# Patient Record
Sex: MALE | Race: WHITE
[De-identification: names, ages, dates, MRNs, and addresses within clinical notes are randomized per-mention and may not be internally consistent; named-entity substitution may affect disease eponyms.]

---

## 2018-03-15 ENCOUNTER — HOSPITAL ENCOUNTER (EMERGENCY)
Dept: HOSPITAL 87 - ER | Age: 68
LOS: 1 days | Discharge: HOME | End: 2018-03-16
Payer: COMMERCIAL

## 2018-03-15 VITALS — BODY MASS INDEX: 26.72 KG/M2 | WEIGHT: 156.53 LBS | HEIGHT: 64 IN

## 2018-03-15 DIAGNOSIS — R22.0: ICD-10-CM

## 2018-03-15 DIAGNOSIS — R10.9: Primary | ICD-10-CM

## 2018-03-15 DIAGNOSIS — F41.9: ICD-10-CM

## 2018-03-15 DIAGNOSIS — I10: ICD-10-CM

## 2018-03-15 DIAGNOSIS — M79.1: ICD-10-CM

## 2018-03-15 DIAGNOSIS — R05: ICD-10-CM

## 2018-03-15 DIAGNOSIS — Z88.2: ICD-10-CM

## 2018-03-15 DIAGNOSIS — E11.9: ICD-10-CM

## 2018-03-15 LAB
BASOPHILS NFR BLD AUTO: 0.7 % (ref 0–2)
CHLORIDE SERPL-SCNC: 102 MEQ/L (ref 98–107)
EOSINOPHIL NFR BLD AUTO: 1.1 % (ref 0–5)
ERYTHROCYTE [DISTWIDTH] IN BLOOD BY AUTOMATED COUNT: 14.1 % (ref 11.6–14.6)
HCT VFR BLD AUTO: 40.2 % (ref 42–52)
HGB BLD-MCNC: 14 G/DL (ref 14–18)
LYMPHOCYTES NFR BLD AUTO: 15.5 % (ref 20–50)
MCH RBC QN AUTO: 29.3 PG (ref 28–32)
MCV RBC AUTO: 83.7 FL (ref 80–94)
MONOCYTES NFR BLD AUTO: 12.5 % (ref 2–8)
NEUTROPHILS NFR BLD AUTO: 70.2 % (ref 40–76)
PLATELET # BLD AUTO: 318 X1000/UL (ref 130–400)
PMV BLD AUTO: 7.1 FL (ref 7.4–10.4)
RBC # BLD AUTO: 4.8 MILL/UL (ref 4.7–6.1)

## 2018-03-15 PROCEDURE — 70450 CT HEAD/BRAIN W/O DYE: CPT

## 2018-03-15 PROCEDURE — 85025 COMPLETE CBC W/AUTO DIFF WBC: CPT

## 2018-03-15 PROCEDURE — 71045 X-RAY EXAM CHEST 1 VIEW: CPT

## 2018-03-15 PROCEDURE — 83690 ASSAY OF LIPASE: CPT

## 2018-03-15 PROCEDURE — 70486 CT MAXILLOFACIAL W/O DYE: CPT

## 2018-03-15 PROCEDURE — 80053 COMPREHEN METABOLIC PANEL: CPT

## 2018-03-15 PROCEDURE — 36415 COLL VENOUS BLD VENIPUNCTURE: CPT

## 2018-03-15 PROCEDURE — 87804 INFLUENZA ASSAY W/OPTIC: CPT

## 2018-03-15 PROCEDURE — 80305 DRUG TEST PRSMV DIR OPT OBS: CPT

## 2018-03-15 PROCEDURE — 99285 EMERGENCY DEPT VISIT HI MDM: CPT

## 2018-03-15 PROCEDURE — 81003 URINALYSIS AUTO W/O SCOPE: CPT

## 2018-03-16 VITALS — SYSTOLIC BLOOD PRESSURE: 118 MMHG | DIASTOLIC BLOOD PRESSURE: 82 MMHG

## 2018-03-16 LAB
AMPHETAMINES UR QL SCN: NEGATIVE
APPEARANCE UR: CLEAR
BARBITURATES UR QL SCN: NEGATIVE
BENZODIAZ UR QL SCN: NEGATIVE
BZE UR QL SCN: NEGATIVE
CANNABINOIDS UR QL SCN: NEGATIVE
COLOR UR: YELLOW
HGB UR QL STRIP: (no result)
KETONES UR STRIP-MCNC: NEGATIVE MG/DL
LEUKOCYTE ESTERASE UR QL STRIP: NEGATIVE
METHADONE UR QL SCN: NEGATIVE
NITRITE UR QL STRIP: NEGATIVE
OPIATES UR QL SCN: (no result)
PCP UR QL SCN: NEGATIVE
PH UR STRIP: 5.5 [PH] (ref 4.5–8)
PROT UR QL STRIP: (no result)
SP GR UR STRIP: 1.03 (ref 1–1.03)
UROBILINOGEN UR STRIP-MCNC: 0.2 E.U./DL (ref 0.2–1)

## 2019-06-08 ENCOUNTER — HOSPITAL ENCOUNTER (INPATIENT)
Dept: HOSPITAL 10 - E/R | Age: 69
LOS: 7 days | Discharge: HOME | DRG: 202 | End: 2019-06-15
Attending: INTERNAL MEDICINE | Admitting: INTERNAL MEDICINE
Payer: MEDICARE

## 2019-06-08 ENCOUNTER — HOSPITAL ENCOUNTER (INPATIENT)
Dept: HOSPITAL 91 - E/R | Age: 69
LOS: 7 days | Discharge: HOME | DRG: 202 | End: 2019-06-15
Payer: MEDICARE

## 2019-06-08 VITALS
WEIGHT: 202.83 LBS | HEIGHT: 65 IN | HEIGHT: 65 IN | BODY MASS INDEX: 33.79 KG/M2 | WEIGHT: 202.83 LBS | BODY MASS INDEX: 33.79 KG/M2

## 2019-06-08 DIAGNOSIS — N40.0: ICD-10-CM

## 2019-06-08 DIAGNOSIS — J96.21: ICD-10-CM

## 2019-06-08 DIAGNOSIS — E78.5: ICD-10-CM

## 2019-06-08 DIAGNOSIS — E11.8: ICD-10-CM

## 2019-06-08 DIAGNOSIS — E87.6: ICD-10-CM

## 2019-06-08 DIAGNOSIS — J45.901: Primary | ICD-10-CM

## 2019-06-08 DIAGNOSIS — I50.9: ICD-10-CM

## 2019-06-08 DIAGNOSIS — I11.0: ICD-10-CM

## 2019-06-08 DIAGNOSIS — E66.01: ICD-10-CM

## 2019-06-08 LAB
ADD MAN DIFF?: NO
ADD UMIC: NO
ALANINE AMINOTRANSFERASE: 29 IU/L (ref 13–69)
ALBUMIN/GLOBULIN RATIO: 1.21
ALBUMIN: 3.9 G/DL (ref 3.3–4.9)
ALKALINE PHOSPHATASE: 111 IU/L (ref 42–121)
ALLEN TEST: (no result)
AMYLASE: 89 U/L (ref 11–123)
ANION GAP: 6 (ref 5–13)
ARTERIAL BASE EXCESS: 2.2 MMOL/L (ref -3–3)
ARTERIAL BLOOD GAS OXYGEN SAT: 97.9 MMHG (ref 95–98)
ARTERIAL COHB: 0.8 % (ref 0–3)
ARTERIAL FRACTION OF OXYHGB: 96.6 % (ref 93–99)
ARTERIAL HCO3: 27.2 MMOL/L (ref 22–26)
ARTERIAL METHB: 0.5 % (ref 0–1.5)
ARTERIAL PCO2: 43.6 MMHG (ref 35–45)
ASPARTATE AMINO TRANSFERASE: 33 IU/L (ref 15–46)
B-TYPE NATRIURETIC PEPTIDE: 320 PG/ML (ref 0–125)
BASOPHIL #: 0 10^3/UL (ref 0–0.1)
BASOPHILS %: 0.5 % (ref 0–2)
BILIRUBIN,DIRECT: 0 MG/DL (ref 0–0.2)
BILIRUBIN,TOTAL: 1.4 MG/DL (ref 0.2–1.3)
BLOOD UREA NITROGEN: 16 MG/DL (ref 7–20)
CALCIUM: 8.7 MG/DL (ref 8.4–10.2)
CARBON DIOXIDE: 27 MMOL/L (ref 21–31)
CHLORIDE: 105 MMOL/L (ref 97–110)
CK INDEX: 0.9
CK-MB: 1.34 NG/ML (ref 0–2.4)
CREATINE KINASE: 153 IU/L (ref 23–200)
CREATININE: 1.03 MG/DL (ref 0.61–1.24)
EOSINOPHILS #: 0.8 10^3/UL (ref 0–0.5)
EOSINOPHILS %: 9.1 % (ref 0–7)
FIO2: 32 %
GLOBULIN: 3.2 G/DL (ref 1.3–3.2)
GLUCOSE: 181 MG/DL (ref 70–220)
HEMATOCRIT: 41.8 % (ref 42–52)
HEMOGLOBIN: 14.3 G/DL (ref 14–18)
IMMATURE GRANS #M: 0.04 10^3/UL (ref 0–0.03)
IMMATURE GRANS % (M): 0.5 % (ref 0–0.43)
INR: 0.99
LACTIC ACID: 1.4 MMOL/L (ref 0.5–2)
LACTIC ACID: 1.5 MMOL/L (ref 0.5–2)
LIPASE: 126 U/L (ref 23–300)
LYMPHOCYTES #: 1.4 10^3/UL (ref 0.8–2.9)
LYMPHOCYTES %: 16.8 % (ref 15–51)
MEAN CORPUSCULAR HEMOGLOBIN: 28.3 PG (ref 29–33)
MEAN CORPUSCULAR HGB CONC: 34.2 G/DL (ref 32–37)
MEAN CORPUSCULAR VOLUME: 82.8 FL (ref 82–101)
MEAN PLATELET VOLUME: 9.6 FL (ref 7.4–10.4)
MODE: (no result)
MONOCYTE #: 0.9 10^3/UL (ref 0.3–0.9)
MONOCYTES %: 10.7 % (ref 0–11)
NEUTROPHIL #: 5.2 10^3/UL (ref 1.6–7.5)
NEUTROPHILS %: 62.4 % (ref 39–77)
NUCLEATED RED BLOOD CELLS #: 0 10^3/UL (ref 0–0)
NUCLEATED RED BLOOD CELLS%: 0 /100WBC (ref 0–0)
O2 A-A PPRESDIFF RESPIRATORY: 73.9 MMHG (ref 7–24)
PARTIAL THROMBOPLASTIN TIME: 30.9 SEC (ref 23–35)
PLATELET COUNT: 220 10^3/UL (ref 140–415)
POTASSIUM: 3.2 MMOL/L (ref 3.5–5.1)
PROTIME: 13.2 SEC (ref 11.9–14.9)
PT RATIO: 1
RED BLOOD COUNT: 5.05 10^6/UL (ref 4.7–6.1)
RED CELL DISTRIBUTION WIDTH: 13.5 % (ref 11.5–14.5)
SODIUM: 138 MMOL/L (ref 135–144)
TOTAL PROTEIN: 7.1 G/DL (ref 6.1–8.1)
TROPONIN-I: < 0.012 NG/ML (ref 0–0.12)
UR ASCORBIC ACID: 40 MG/DL
UR BILIRUBIN (DIP): NEGATIVE MG/DL
UR BLOOD (DIP): NEGATIVE MG/DL
UR CLARITY: CLEAR
UR COLOR: YELLOW
UR GLUCOSE (DIP): NEGATIVE MG/DL
UR KETONES (DIP): NEGATIVE MG/DL
UR LEUKOCYTE ESTERASE (DIP): NEGATIVE LEU/UL
UR NITRITE (DIP): NEGATIVE MG/DL
UR PH (DIP): 5 (ref 5–9)
UR SPECIFIC GRAVITY (DIP): 1.02 (ref 1–1.03)
UR TOTAL PROTEIN (DIP): NEGATIVE MG/DL
UR UROBILINOGEN (DIP): NEGATIVE MG/DL
WHITE BLOOD COUNT: 8.3 10^3/UL (ref 4.8–10.8)

## 2019-06-08 PROCEDURE — 80053 COMPREHEN METABOLIC PANEL: CPT

## 2019-06-08 PROCEDURE — 94664 DEMO&/EVAL PT USE INHALER: CPT

## 2019-06-08 PROCEDURE — 93306 TTE W/DOPPLER COMPLETE: CPT

## 2019-06-08 PROCEDURE — 83605 ASSAY OF LACTIC ACID: CPT

## 2019-06-08 PROCEDURE — 85610 PROTHROMBIN TIME: CPT

## 2019-06-08 PROCEDURE — 94644 CONT INHLJ TX 1ST HOUR: CPT

## 2019-06-08 PROCEDURE — 94660 CPAP INITIATION&MGMT: CPT

## 2019-06-08 PROCEDURE — 83735 ASSAY OF MAGNESIUM: CPT

## 2019-06-08 PROCEDURE — 82803 BLOOD GASES ANY COMBINATION: CPT

## 2019-06-08 PROCEDURE — 85730 THROMBOPLASTIN TIME PARTIAL: CPT

## 2019-06-08 PROCEDURE — 85025 COMPLETE CBC W/AUTO DIFF WBC: CPT

## 2019-06-08 PROCEDURE — 82553 CREATINE MB FRACTION: CPT

## 2019-06-08 PROCEDURE — 82962 GLUCOSE BLOOD TEST: CPT

## 2019-06-08 PROCEDURE — 87400 INFLUENZA A/B EACH AG IA: CPT

## 2019-06-08 PROCEDURE — 87086 URINE CULTURE/COLONY COUNT: CPT

## 2019-06-08 PROCEDURE — 3E0F7GC INTRODUCTION OF OTHER THERAPEUTIC SUBSTANCE INTO RESPIRATORY TRACT, VIA NATURAL OR ARTIFICIAL OPENING: ICD-10-PCS

## 2019-06-08 PROCEDURE — 36600 WITHDRAWAL OF ARTERIAL BLOOD: CPT

## 2019-06-08 PROCEDURE — 94640 AIRWAY INHALATION TREATMENT: CPT

## 2019-06-08 PROCEDURE — 80061 LIPID PANEL: CPT

## 2019-06-08 PROCEDURE — 82150 ASSAY OF AMYLASE: CPT

## 2019-06-08 PROCEDURE — 84484 ASSAY OF TROPONIN QUANT: CPT

## 2019-06-08 PROCEDURE — 82550 ASSAY OF CK (CPK): CPT

## 2019-06-08 PROCEDURE — 83690 ASSAY OF LIPASE: CPT

## 2019-06-08 PROCEDURE — 99285 EMERGENCY DEPT VISIT HI MDM: CPT

## 2019-06-08 PROCEDURE — 83880 ASSAY OF NATRIURETIC PEPTIDE: CPT

## 2019-06-08 PROCEDURE — 36415 COLL VENOUS BLD VENIPUNCTURE: CPT

## 2019-06-08 PROCEDURE — 87040 BLOOD CULTURE FOR BACTERIA: CPT

## 2019-06-08 PROCEDURE — 3E0F7GC INTRODUCTION OF OTHER THERAPEUTIC SUBSTANCE INTO RESPIRATORY TRACT, VIA NATURAL OR ARTIFICIAL OPENING: ICD-10-PCS | Performed by: INTERNAL MEDICINE

## 2019-06-08 PROCEDURE — 80048 BASIC METABOLIC PNL TOTAL CA: CPT

## 2019-06-08 PROCEDURE — 71045 X-RAY EXAM CHEST 1 VIEW: CPT

## 2019-06-08 PROCEDURE — 83036 HEMOGLOBIN GLYCOSYLATED A1C: CPT

## 2019-06-08 PROCEDURE — 84100 ASSAY OF PHOSPHORUS: CPT

## 2019-06-08 PROCEDURE — 81003 URINALYSIS AUTO W/O SCOPE: CPT

## 2019-06-08 PROCEDURE — 93005 ELECTROCARDIOGRAM TRACING: CPT

## 2019-06-08 RX ADMIN — ATORVASTATIN CALCIUM 1 MG: 20 TABLET, FILM COATED ORAL at 21:19

## 2019-06-08 RX ADMIN — ASPIRIN 81 MG CHEWABLE TABLET 1 MG: 81 TABLET CHEWABLE at 16:54

## 2019-06-08 RX ADMIN — ATORVASTATIN CALCIUM SCH MG: 20 TABLET, FILM COATED ORAL at 21:19

## 2019-06-08 RX ADMIN — IPRATROPIUM BROMIDE 1 MG: 0.5 SOLUTION RESPIRATORY (INHALATION) at 16:09

## 2019-06-08 RX ADMIN — IPRATROPIUM BROMIDE AND ALBUTEROL SULFATE 1 ML: .5; 3 SOLUTION RESPIRATORY (INHALATION) at 20:14

## 2019-06-08 RX ADMIN — FUROSEMIDE 1 MG: 10 INJECTION, SOLUTION INTRAVENOUS at 18:57

## 2019-06-08 RX ADMIN — ZOLPIDEM TARTRATE 1 MG: 5 TABLET, FILM COATED ORAL at 23:08

## 2019-06-08 RX ADMIN — POTASSIUM CHLORIDE 1 MEQ: 1500 TABLET, EXTENDED RELEASE ORAL at 21:19

## 2019-06-08 RX ADMIN — ALBUTEROL SULFATE 1 MG: 2.5 SOLUTION RESPIRATORY (INHALATION) at 16:09

## 2019-06-08 RX ADMIN — INSULIN ASPART 1 UNIT: 100 INJECTION, SOLUTION INTRAVENOUS; SUBCUTANEOUS at 21:00

## 2019-06-08 RX ADMIN — ZOLPIDEM TARTRATE PRN MG: 5 TABLET, FILM COATED ORAL at 23:08

## 2019-06-08 RX ADMIN — INSULIN GLARGINE 1 UNITS: 100 INJECTION, SOLUTION SUBCUTANEOUS at 20:00

## 2019-06-08 RX ADMIN — METHYLPREDNISOLONE SODIUM SUCCINATE 1 MG: 125 INJECTION, POWDER, FOR SOLUTION INTRAMUSCULAR; INTRAVENOUS at 23:12

## 2019-06-08 RX ADMIN — METHYLPREDNISOLONE SODIUM SUCCINATE 1 MG: 125 INJECTION, POWDER, FOR SOLUTION INTRAMUSCULAR; INTRAVENOUS at 21:21

## 2019-06-08 NOTE — HP
Date/Time of Note


Date/Time of Note


DATE: 6/8/19 


TIME: 19:40





Assessment/Plan


VTE Prophylaxis


Pharmacological prophylaxis:  LMWH





Lines/Catheters


IV Catheter Type (from [facility]):  Saline Lock





Assessment/Plan


Hospital Course


1.  Acute on chronic hypoxemic respiratory failure secondary to asthma 


exacerbation and mild decompensated CHF


Steroids and breathing treatments


Patient uses only rescue inhalers and will likely need long-acting beta agonist 


and inhaled steroids upon DC


Pulmonology consultation


Lasix IV





2.  History of CHF with mild decompensation


2D echo


Continue home meds including Lasix





3.  History of diabetes


Hold home Januvia


Scheduled insulin sliding scale


Follow-up in A1c





4.  Hypertension


Continue home meds





5.  BPH


Continue home Flomax





6.  Dyslipidemia


Continue statin





7.  Hypokalemia


Replete





8.  Morbid obesity


Lifestyle changes





Prophylaxis: Lovenox


Result Diagram:  


6/8/19 1603                                                                     


          6/8/19 1603





Results 24hrs





Laboratory Tests


 Test
                                6/8/19
15:58  6/8/19
16:03  6/8/19
16:05


 Bedside Glucose                             172


 White Blood Count                                         8.3


 Red Blood Count                                          5.05


 Hemoglobin                                               14.3


 Hematocrit                                              41.8  L


 Mean Corpuscular Volume                                  82.8


 Mean Corpuscular Hemoglobin                             28.3  L


 Mean Corpuscular Hemoglobin
Concent  
                  34.2  
  



 Red Cell Distribution Width                              13.5


 Platelet Count                                            220


 Mean Platelet Volume                                      9.6


 Immature Granulocytes %                                0.500  H


 Neutrophils %                                            62.4


 Lymphocytes %                                            16.8


 Monocytes %                                              10.7


 Eosinophils %                                            9.1  H


 Basophils %                                               0.5


 Nucleated Red Blood Cells %                               0.0


 Immature Granulocytes #                                0.040  H


 Neutrophils #                                             5.2


 Lymphocytes #                                             1.4


 Monocytes #                                               0.9


 Eosinophils #                                            0.8  H


 Basophils #                                               0.0


 Nucleated Red Blood Cells #                               0.0


 Prothrombin Time                                         13.2


 Prothrombin Time Ratio                                    1.0


 INR International Normalized
Ratio   
                  0.99  
  



 Activated Partial
Thromboplast Time  
                  30.9  
  



 Sodium Level                                              138


 Potassium Level                                          3.2  L


 Chloride Level                                            105


 Carbon Dioxide Level                                       27


 Anion Gap                                                   6


 Blood Urea Nitrogen                                        16


 Creatinine                                               1.03


 Est Glomerular Filtrat Rate
mL/min   
             > 60  
       



 Glucose Level                                             181


 Calcium Level                                             8.7


 Total Bilirubin                                          1.4  H


 Direct Bilirubin                                         0.00


 Indirect Bilirubin                                       1.4  H


 Aspartate Amino Transf
(AST/SGOT)    
                    33  
  



 Alanine Aminotransferase
(ALT/SGPT)  
                    29  
  



 Alkaline Phosphatase                                      111


 Creatine Kinase                                           153


 Creatine Kinase Index                                     0.9


 Creatinine Kinase MB (Mass)                              1.34


 Troponin I                                         < 0.012


 B-Type Natriuretic Peptide                               320  H


 Total Protein                                             7.1


 Albumin                                                   3.9


 Globulin                                                 3.20


 Albumin/Globulin Ratio                                   1.21


 Amylase Level                                              89


 Lipase                                                    126


 POC Venous Lactate                                                      1.2








HPI/ROS


Admit Date/Time


Admit Date/Time


June 8, 2019





Hx of Present Illness


Patient is a 5-year-old male with a history of obesity, non-insulin-dependent 


diabetes, CHF, hypertension, dyslipidemia, BPH and asthma.  Patient uses 


albuterol rescue for his asthma, patient presents with worsening shortness of 


breath, patient was brought in by paramedics and was placed on BiPAP.  Patient 


reports compliance with his medications, in the ED chest x-ray showed only 


pulmonary congestion and mild elevation of BNP.  Patient has no other complaints


at this time.





ROS


Constitutional:  no complaints, improved


Eyes:  no complaints


ENT:  no complaints


Respiratory:  shortness of breath


Cardiovascular:  no complaints


Gastrointestinal:  no complaints


Genitourinary:  no complaints


Musculoskeletal:  no complaints


Skin:  no complaints


Neurologic:  no complaints


Endocrine:  no complaints


Lymphatic:  no complaints


Psychological:  no complaints, nl mood/affect


Immunologic:  no complaints





PMH/Family/Social


Past Medical History


As per HPI


Medications





Current Medications


Ondansetron HCl (Zofran Inj) 4 mg ER BRIDGE PRN IV NAUSEA/VOMITING;  Start 


6/8/19 at 17:30;  Stop 6/9/19 at 17:29


Acetaminophen (Tylenol Tab) 650 mg ER BRIDGE PRN PO .MILD PAIN 1-3 OR TEMP;  


Start 6/8/19 at 17:30;  Stop 6/9/19 at 17:29


Coded Allergies:  


     Sulfa (Sulfonamide Antibiotics) (Verified  Allergy, Unknown, 6/8/19)





Family History


Significant Family History:  no pertinent family hx





Social History


Alcohol Use:  rarely


Smoking Status:  Former smoker


Drug Use:  none





Exam/Review of Systems


Vital Signs


Vitals





Vital Signs


  Date      Temp  Pulse  Resp  B/P (MAP)   Pulse Ox  O2          O2 Flow    FiO2


Time                                                 Delivery    Rate


    6/8/19           92                         100                           35


     19:14


    6/8/19  98.6           24      144/71            BIPAP


     17:15                           (95)








Exam


Constitutional:  alert, oriented


Respiratory:  wheezing


Cardiovascular:  regular rate and rhythm


Gastrointestinal:  soft; 


   No distended


Musculoskeletal:  nl extremities to inspection











ANA CRISTINA ROGER                   Jun 8, 2019 19:46

## 2019-06-08 NOTE — ERD
ER Documentation


Chief Complaint


Chief Complaint





SOB





HPI


This is a 68-year-old male with a known history of congestive heart failure on 


40 mg of Lasix on a daily basis.  The patient indicates that just prior to 


arrival he developed severe difficulty breathing and shortness of breath.  He 


denies any recent fever shaking or chills.  He denies a cough.  When EMS arrived


he also started to complain of chest pressure.  He stated the chest pressure was


on the left side of his chest and did not radiate to his left arm but not to his


neck back or jaw.  They administered nitroglycerin and 162 mg of aspirin which 


she stated improved his chest pressure.  EMS stated patient was in severe 


respiratory distress hypoxic at 80%.  They placed the patient on a CPAP.  The 


patient indicates he has been intubated in the past and had multiple previous 


hospital admissions for severe CHF exacerbations.  Patient denies tobacco use.





ROS


All systems reviewed and are negative except as per history of present illness.





Medications


Home Meds


Reported Medications


Levalbuterol* (Xopenex* HFA) 15 Gm Inha, 2 PUFFS INH Q4H PRN for WHEEZING AND 


SOB, INHALER


   19


Atorvastatin Calcium* (Atorvastatin Calcium*) 20 Mg Tablet, 20 MG PO QHS, #30 


TAB


   19


Furosemide* (Furosemide*) 20 Mg Tablet, 20 MG PO DAILY, #60 TAB


   19


Venlafaxine Hcl* (Venlafaxine Hcl*) 50 Mg Tablet, 50 MG PO DAILY, TAB


   19


Sitagliptin* (Januvia*) 100 Mg Tablet, 100 MG PO DAILY, #30 TAB


   19


Lisinopril* (Lisinopril*) 40 Mg Tablet, 40 MG PO DAILY, #30 TAB


   19


Tamsulosin Hcl* (Flomax*) 0.4 Mg Cap.er.24h, 0.4 MG PO DAILY, CAP


   19





Allergies


Allergies:  


Coded Allergies:  


     Sulfa (Sulfonamide Antibiotics) (Verified  Allergy, Unknown, 19)





PMhx/Soc


History of Surgery:  Yes (ABD-BLADDER, KNEE)


Anesthesia Reaction:  No


Hx Neurological Disorder:  No


Hx Respiratory Disorders:  Yes (COPD)


Hx Cardiac Disorders:  Yes (CHF, HTN)


Hx Psychiatric Problems:  No


Hx Miscellaneous Medical Probl:  No


Hx Alcohol Use:  No


Hx Substance Use:  No


Hx Tobacco Use:  No


Smoking Status:  Former smoker





Physical Exam


Vitals





Vital Signs


  Date      Temp  Pulse  Resp  B/P (MAP)   Pulse Ox  O2          O2 Flow    FiO2


Time                                                 Delivery    Rate


    19           85                          98                           35


     16:00


    19           85    28


     16:00


    19  98.6     90    17     143/109        98


     15:44                          (120)





Physical Exam


Constitutional:Well-developed. Well-nourished.  Patient in severe respiratory 


distress.


HEENT:Normocephalic. Atraumatic.Pupils were equal round reactive to light. Moist


mucous membranes.No tonsillar exudates.


Neck: No nuchal rigidity. No lymphadenopathy. No posterior cervical spine 


tenderness or step-offs.


Respiratory: Patient using accessory muscles of respiration.  Unable to speak 


more than 2 words at a time before becoming short of breath.  Bilateral rhonchi.


Cardiovascular: Tachycardic with regular rhythm.No murmurs. No rubs were 


appreciated.S1, S2 normal. Distal pulses are palpable 2+ bilaterally.


GI: Abdomen was soft. Nontender. Non Distended. No pulsatile abdominal masses or


bruits. No rebound. No guarding. Bowel sounds were present and normal. 


Muscle skeletal: Full range of motion of both the upper and lower extremities 


bilaterally.Normal muscle tone.No assymetrical calf tenderness or swelling. 


Skin: No petechia, no purpura. No lesions on the palms or the soles of the feet.


Neuro: Patient was alert and awake x3.  No facial droop. Gait not observed as 


patient was in severe respiratory distress.Speech had regular rate and rhythm. 


No focal neurological deficits.


Result Diagram:  


19 1603                                                                     


          19 1603





Results 24 hrs





Laboratory Tests


Test
                                19
15:58    19
16:03  19
16:05


Bedside Glucose                        172 mg/dL


White Blood Count                                    8.3 10^3/ul


Red Blood Count                                     5.05 10^6/ul


Hemoglobin                                             14.3 g/dl


Hematocrit                                                41.8 %


Mean Corpuscular Volume                                  82.8 fl


Mean Corpuscular Hemoglobin                              28.3 pg


Mean Corpuscular Hemoglobin
Concent  
                34.2 g/dl 
  



Red Cell Distribution Width                               13.5 %


Platelet Count                                       220 10^3/UL


Mean Platelet Volume                                      9.6 fl


Immature Granulocytes %                                  0.500 %


Neutrophils %                                             62.4 %


Lymphocytes %                                             16.8 %


Monocytes %                                               10.7 %


Eosinophils %                                              9.1 %


Basophils %                                                0.5 %


Nucleated Red Blood Cells %                          0.0 /100WBC


Immature Granulocytes #                            0.040 10^3/ul


Neutrophils #                                        5.2 10^3/ul


Lymphocytes #                                        1.4 10^3/ul


Monocytes #                                          0.9 10^3/ul


Eosinophils #                                        0.8 10^3/ul


Basophils #                                          0.0 10^3/ul


Nucleated Red Blood Cells #                          0.0 10^3/ul


Prothrombin Time                                        13.2 Sec


Prothrombin Time Ratio                                       1.0


INR International Normalized
Ratio   
                     0.99 
  



Activated Partial
Thromboplast Time  
                 30.9 Sec 
  



Sodium Level                                          138 mmol/L


Potassium Level                                       3.2 mmol/L


Chloride Level                                        105 mmol/L


Carbon Dioxide Level                                   27 mmol/L


Anion Gap                                                      6


Blood Urea Nitrogen                                     16 mg/dl


Creatinine                                            1.03 mg/dl


Est Glomerular Filtrat Rate
mL/min   
             > 60 mL/min 
   



Glucose Level                                          181 mg/dl


Calcium Level                                          8.7 mg/dl


Total Bilirubin                                        1.4 mg/dl


Direct Bilirubin                                      0.00 mg/dl


Indirect Bilirubin                                     1.4 mg/dl


Aspartate Amino Transf
(AST/SGOT)    
                  33 IU/L 
  



Alanine Aminotransferase
(ALT/SGPT)  
                  29 IU/L 
  



Alkaline Phosphatase                                    111 IU/L


Creatine Kinase                                         153 IU/L


Creatine Kinase Index                                        0.9


Creatinine Kinase MB (Mass)                           1.34 ng/ml


Troponin I                                         < 0.012 ng/ml


B-Type Natriuretic Peptide                             320 PG/ML


Total Protein                                           7.1 g/dl


Albumin                                                 3.9 g/dl


Globulin                                               3.20 g/dl


Albumin/Globulin Ratio                                      1.21


Amylase Level                                             89 U/L


Lipase                                                   126 U/L


POC Venous Lactate                                                  1.2 mmol/L





Current Medications


 Medications
   Dose
          Sig/El
       Start Time
   Status  Last


 (Trade)       Ordered        Route
 PRN     Stop Time              Admin
Dose


                              Reason                                Admin


 Aspirin
       162 mg         ONCE  STAT
    19        DC            19


(Aspirin)                     PO
            15:45
 19                16:54



                                             15:49


 Albuterol
     10 mg          ONCE  STAT
    19        DC            19


(Proventil                    INH
           15:45
 19                16:09



0.5%
  (Neb))                                15:49


 Ipratropium
   1 mg           ONCE  STAT
    19        DC            19


Bromide
                      INH
           15:45
 19                16:09



(Atrovent                                    15:49


0.02%



(Neb))


 Furosemide
    40 mg          ONCE  ONCE
    19                 



(Lasix)                       IV
            17:30
 19


                                             17:31








Procedures/MDM


The patient presented to the emergency department with shortness of breath. My 


differential diagnosis included but was not limited to upper airway obstruction,


CHF, pulmonary embolism, cardiac ischemia, pneumonia, pneumothorax, anemia, drug


overdose, pulmonary edema, COPD or asthma. 





The patient was immediately placed on a BiPAP with noninvasive mechanical 


ventilation due to his severe respiratory distress.  He was given continuous 


nebulizer treatments of albuterol and Atrovent.  Chest radiograph was ordered 


and reviewed by myself and the radiologist and indicate the followin.  No evidence for acute cardiopulmonary disease. 


2.  Mild cardiomegaly with central pulmonary vascular congestion.  No evidence 


of interstitial pulmonary edema.


3.  Mild atherosclerotic calcification of the thoracic aorta.


 


The patient's clinical exam did appear to be a result of congestive heart 


failure exacerbation.  The patient's BNP was elevated 302.  Patient received IV 


Lasix.





The patient also was complaining of chest pain and pressure.  He received a


spirin and nitroglycerin in route.  His chest pain had resolved.  He did receive


another 162 mg of aspirin.  He will be admitted for serial twelve-lead EKG 


tracings and cardiac set of enzymes to the hospitalist.  12 Lead EKG tracing 


ordered and reviewed by myself showed: 


Normal sinus rhythm of 100 bpm and no arrhythmia.


OH interval normal.


QRS duration normal.


No ST segment elevation.  Frequent PVCs . left axis deviation.


No ST segment depression. No changes consistent with acute ischemia. 





The patient had significant improvement of his respiratory distress.  He is no 


longer using accessory muscles of respiration.





The patient no risk factors for pulmonary embolism.


Critical Care:


Time: 65 minutes


Treatments/Evaluations: Close monitoring and treatment of unstable vital signs, 


cardiorespiratory, and neurologic status, while maintaining tight balance of 


fluid, respiratory, and cardiac interventions.  Time does not include performing


any of the above billable procedures.





Departure


Diagnosis:  


   Primary Impression:  


   CHF (congestive heart failure)


   Heart failure type:  systolic  Heart failure chronicity:  acute on chronic  


   Qualified Codes:  I50.23 - Acute on chronic systolic (congestive) heart 


   failure


   Additional Impression:  


   Chest pain


   Chest pain type:  unspecified  Qualified Codes:  R07.9 - Chest pain, 


   unspecified


Condition:  Serious











WILLIAM BOYER MD             2019 17:25

## 2019-06-09 VITALS — RESPIRATION RATE: 19 BRPM | SYSTOLIC BLOOD PRESSURE: 116 MMHG | DIASTOLIC BLOOD PRESSURE: 67 MMHG | HEART RATE: 111 BPM

## 2019-06-09 VITALS — DIASTOLIC BLOOD PRESSURE: 80 MMHG | HEART RATE: 104 BPM | SYSTOLIC BLOOD PRESSURE: 138 MMHG | RESPIRATION RATE: 20 BRPM

## 2019-06-09 VITALS — SYSTOLIC BLOOD PRESSURE: 114 MMHG | RESPIRATION RATE: 18 BRPM | DIASTOLIC BLOOD PRESSURE: 69 MMHG | HEART RATE: 108 BPM

## 2019-06-09 VITALS — DIASTOLIC BLOOD PRESSURE: 72 MMHG | SYSTOLIC BLOOD PRESSURE: 121 MMHG | RESPIRATION RATE: 23 BRPM | HEART RATE: 100 BPM

## 2019-06-09 VITALS — HEART RATE: 96 BPM | DIASTOLIC BLOOD PRESSURE: 78 MMHG | RESPIRATION RATE: 20 BRPM | SYSTOLIC BLOOD PRESSURE: 120 MMHG

## 2019-06-09 VITALS — HEART RATE: 95 BPM

## 2019-06-09 VITALS — SYSTOLIC BLOOD PRESSURE: 127 MMHG | HEART RATE: 92 BPM | DIASTOLIC BLOOD PRESSURE: 86 MMHG | RESPIRATION RATE: 20 BRPM

## 2019-06-09 VITALS — SYSTOLIC BLOOD PRESSURE: 129 MMHG | RESPIRATION RATE: 20 BRPM | DIASTOLIC BLOOD PRESSURE: 78 MMHG | HEART RATE: 97 BPM

## 2019-06-09 VITALS — HEART RATE: 103 BPM

## 2019-06-09 VITALS — HEART RATE: 101 BPM

## 2019-06-09 VITALS — HEART RATE: 105 BPM

## 2019-06-09 VITALS — HEART RATE: 108 BPM

## 2019-06-09 VITALS — HEART RATE: 88 BPM

## 2019-06-09 VITALS — HEART RATE: 115 BPM

## 2019-06-09 LAB
ADD MAN DIFF?: YES
ALLEN TEST: (no result)
ANION GAP: 12 (ref 5–13)
ANISOCYTOSIS: (no result) (ref 0–0)
ARTERIAL BASE EXCESS: -2.1 MMOL/L (ref -3–3)
ARTERIAL BLOOD GAS OXYGEN SAT: 98 MMHG (ref 95–98)
ARTERIAL COHB: 0.1 % (ref 0–3)
ARTERIAL FRACTION OF OXYHGB: 97.4 % (ref 93–99)
ARTERIAL HCO3: 23.1 MMOL/L (ref 22–26)
ARTERIAL METHB: 0.5 % (ref 0–1.5)
ARTERIAL PCO2: 41.4 MMHG (ref 35–45)
BAND NEUTROPHILS #M: 0.3 10^3/UL (ref 0–0.6)
BAND NEUTROPHILS % (M): 3 % (ref 0–4)
BASOPHIL #M: 0.1 10^3/UL (ref 0–0)
BASOPHILS % (M): 1 % (ref 0–2)
BLOOD GAS IEPAP: (no result)
BLOOD UREA NITROGEN: 20 MG/DL (ref 7–20)
BURR CELLS: (no result) (ref 0–0)
CALCIUM: 9.2 MG/DL (ref 8.4–10.2)
CARBON DIOXIDE: 26 MMOL/L (ref 21–31)
CHLORIDE: 104 MMOL/L (ref 97–110)
CHOL/HDL RATIO: 3.2 RATIO
CHOLESTEROL: 130 MG/DL (ref 100–200)
CREATININE: 1.26 MG/DL (ref 0.61–1.24)
FIO2: 35 %
GLUCOSE: 290 MG/DL (ref 70–220)
HDL CHOLESTEROL: 40 MG/DL (ref 31–75)
HEMATOCRIT: 44 % (ref 42–52)
HEMOGLOBIN A1C: 6.3 % (ref 0–5.9)
HEMOGLOBIN: 14.7 G/DL (ref 14–18)
IMMATURE GRANS #M: 0.07 10^3/UL (ref 0–0.03)
IMMATURE GRANS % (M): 0.6 % (ref 0–0.43)
LDL CHOLESTEROL,CALCULATED: 76 MG/DL
LYMPHOCYTES #M: 0.5 10^3/UL (ref 0.8–2.9)
LYMPHOCYTES % (M): 5 % (ref 15–51)
MAGNESIUM: 1.9 MG/DL (ref 1.7–2.5)
MEAN CORPUSCULAR HEMOGLOBIN: 28.1 PG (ref 29–33)
MEAN CORPUSCULAR HGB CONC: 33.4 G/DL (ref 32–37)
MEAN CORPUSCULAR VOLUME: 84.1 FL (ref 82–101)
MEAN PLATELET VOLUME: 9.7 FL (ref 7.4–10.4)
MICROCYTOSIS: (no result) (ref 0–0)
MODE: (no result)
MONOCYTE #M: 0.1 10^3/UL (ref 0.3–0.9)
MONOCYTES % (M): 1 % (ref 0–11)
NUCLEATED RED BLOOD CELLS%: 0 /100WBC (ref 0–0)
O2 A-A PPRESDIFF RESPIRATORY: 89.2 MMHG (ref 7–24)
PHOSPHORUS: 2.4 MG/DL (ref 2.5–4.9)
PLATELET COUNT: 255 10^3/UL (ref 140–415)
PLATELET ESTIMATE: NORMAL
POIKILOCYTOSIS: (no result) (ref 0–0)
POSITIVE DIFF: (no result)
POTASSIUM: 3.6 MMOL/L (ref 3.5–5.1)
RED BLOOD COUNT: 5.23 10^6/UL (ref 4.7–6.1)
RED CELL DISTRIBUTION WIDTH: 13.7 % (ref 11.5–14.5)
SEG NEUT #M: 9.8 10^3/UL (ref 1.6–7.5)
SEGMENTED NEUTROPHILS (M) %: 90 % (ref 39–77)
SMUDGE%M: 14 % (ref 0–0)
SODIUM: 142 MMOL/L (ref 135–144)
TRIGLYCERIDES: 72 MG/DL (ref 0–149)
WHITE BLOOD COUNT: 10.9 10^3/UL (ref 4.8–10.8)

## 2019-06-09 RX ADMIN — METHYLPREDNISOLONE SODIUM SUCCINATE 1 MG: 125 INJECTION, POWDER, FOR SOLUTION INTRAMUSCULAR; INTRAVENOUS at 06:22

## 2019-06-09 RX ADMIN — IPRATROPIUM BROMIDE AND ALBUTEROL SULFATE 1 ML: .5; 3 SOLUTION RESPIRATORY (INHALATION) at 05:12

## 2019-06-09 RX ADMIN — BUDESONIDE 1 MG: 0.5 SUSPENSION RESPIRATORY (INHALATION) at 09:30

## 2019-06-09 RX ADMIN — INSULIN GLARGINE 1 UNITS: 100 INJECTION, SOLUTION SUBCUTANEOUS at 21:55

## 2019-06-09 RX ADMIN — VENLAFAXINE HYDROCHLORIDE 1 MG: 25 TABLET ORAL at 09:00

## 2019-06-09 RX ADMIN — IPRATROPIUM BROMIDE AND ALBUTEROL SULFATE PRN ML: .5; 3 SOLUTION RESPIRATORY (INHALATION) at 08:32

## 2019-06-09 RX ADMIN — INSULIN ASPART 1 UNIT: 100 INJECTION, SOLUTION INTRAVENOUS; SUBCUTANEOUS at 08:19

## 2019-06-09 RX ADMIN — INSULIN ASPART 1 UNIT: 100 INJECTION, SOLUTION INTRAVENOUS; SUBCUTANEOUS at 12:31

## 2019-06-09 RX ADMIN — METHYLPREDNISOLONE SODIUM SUCCINATE 1 MG: 40 INJECTION, POWDER, FOR SOLUTION INTRAMUSCULAR; INTRAVENOUS at 22:04

## 2019-06-09 RX ADMIN — FUROSEMIDE 1 MG: 10 INJECTION, SOLUTION INTRAVENOUS at 08:10

## 2019-06-09 RX ADMIN — IPRATROPIUM BROMIDE AND ALBUTEROL SULFATE 1 ML: .5; 3 SOLUTION RESPIRATORY (INHALATION) at 15:54

## 2019-06-09 RX ADMIN — ATORVASTATIN CALCIUM SCH MG: 20 TABLET, FILM COATED ORAL at 21:39

## 2019-06-09 RX ADMIN — BUDESONIDE 1 MG: 0.5 SUSPENSION RESPIRATORY (INHALATION) at 21:07

## 2019-06-09 RX ADMIN — INSULIN ASPART 1 UNIT: 100 INJECTION, SOLUTION INTRAVENOUS; SUBCUTANEOUS at 21:55

## 2019-06-09 RX ADMIN — IPRATROPIUM BROMIDE AND ALBUTEROL SULFATE PRN ML: .5; 3 SOLUTION RESPIRATORY (INHALATION) at 05:12

## 2019-06-09 RX ADMIN — LISINOPRIL SCH MG: 20 TABLET ORAL at 08:10

## 2019-06-09 RX ADMIN — VENLAFAXINE SCH MG: 25 TABLET ORAL at 09:00

## 2019-06-09 RX ADMIN — TAMSULOSIN HYDROCHLORIDE 1 MG: 0.4 CAPSULE ORAL at 08:10

## 2019-06-09 RX ADMIN — IPRATROPIUM BROMIDE AND ALBUTEROL SULFATE 1 ML: .5; 3 SOLUTION RESPIRATORY (INHALATION) at 13:03

## 2019-06-09 RX ADMIN — ATORVASTATIN CALCIUM 1 MG: 20 TABLET, FILM COATED ORAL at 21:39

## 2019-06-09 RX ADMIN — IPRATROPIUM BROMIDE AND ALBUTEROL SULFATE SCH ML: .5; 3 SOLUTION RESPIRATORY (INHALATION) at 13:03

## 2019-06-09 RX ADMIN — IPRATROPIUM BROMIDE AND ALBUTEROL SULFATE 1 ML: .5; 3 SOLUTION RESPIRATORY (INHALATION) at 21:07

## 2019-06-09 RX ADMIN — METHYLPREDNISOLONE SODIUM SUCCINATE 1 MG: 40 INJECTION, POWDER, FOR SOLUTION INTRAMUSCULAR; INTRAVENOUS at 14:10

## 2019-06-09 RX ADMIN — IPRATROPIUM BROMIDE AND ALBUTEROL SULFATE SCH ML: .5; 3 SOLUTION RESPIRATORY (INHALATION) at 21:07

## 2019-06-09 RX ADMIN — IPRATROPIUM BROMIDE AND ALBUTEROL SULFATE 1 ML: .5; 3 SOLUTION RESPIRATORY (INHALATION) at 03:07

## 2019-06-09 RX ADMIN — TAMSULOSIN HYDROCHLORIDE SCH MG: 0.4 CAPSULE ORAL at 08:10

## 2019-06-09 RX ADMIN — BUDESONIDE SCH MG: 0.5 SUSPENSION RESPIRATORY (INHALATION) at 09:30

## 2019-06-09 RX ADMIN — INSULIN ASPART 1 UNIT: 100 INJECTION, SOLUTION INTRAVENOUS; SUBCUTANEOUS at 03:42

## 2019-06-09 RX ADMIN — ENOXAPARIN SODIUM 1 MG: 100 INJECTION SUBCUTANEOUS at 08:19

## 2019-06-09 RX ADMIN — AZITHROMYCIN 1 MG: 250 TABLET, FILM COATED ORAL at 12:22

## 2019-06-09 RX ADMIN — ENOXAPARIN SODIUM SCH MG: 100 INJECTION SUBCUTANEOUS at 08:19

## 2019-06-09 RX ADMIN — BUDESONIDE SCH MG: 0.5 SUSPENSION RESPIRATORY (INHALATION) at 21:07

## 2019-06-09 RX ADMIN — IPRATROPIUM BROMIDE AND ALBUTEROL SULFATE 1 ML: .5; 3 SOLUTION RESPIRATORY (INHALATION) at 08:32

## 2019-06-09 RX ADMIN — LISINOPRIL 1 MG: 20 TABLET ORAL at 08:10

## 2019-06-09 RX ADMIN — INSULIN ASPART 1 UNIT: 100 INJECTION, SOLUTION INTRAVENOUS; SUBCUTANEOUS at 17:51

## 2019-06-09 RX ADMIN — IPRATROPIUM BROMIDE AND ALBUTEROL SULFATE PRN ML: .5; 3 SOLUTION RESPIRATORY (INHALATION) at 03:07

## 2019-06-09 RX ADMIN — IPRATROPIUM BROMIDE AND ALBUTEROL SULFATE PRN ML: .5; 3 SOLUTION RESPIRATORY (INHALATION) at 15:54

## 2019-06-09 NOTE — CONS
Assessment/Plan


Assessment/Plan


Assessment/Plan (Daily)


Chest x-ray is essentially clear.  ABG also is within normal limits.





Assessment and recommendations;





1.  Patient with history of poorly controlled asthma admitted with exacerbation.


2.  History of diabetes and hypertension.


3.  Possibly underlying sleep apnea.





Continue current supportive care.  Add DuoNeb 4 times daily.  Add Zithromax 


orally.  Continue Solu-Medrol at current dosing as well.  Patient will need to 


have a long-acting bronchodilator in conjunction with inhaled steroid on a 


regular basis as outpatient.  Also will need to have a sleep study done.





Consultation Date/Type/Reason


Admit Date/Time


June 8, 2019


Date of Consultation:  Jun 9, 2019


Type of Consult


Pulmonary





Patient is a pleasant 68-year-old gentleman who came into the hospital with a 


few days history of increased wheezing, coughing, production of white-yellow 


sputum.  Upon evaluation patient has been diagnosed with asthma exacerbation and


treated with appropriate modality regimen.  Patient is reporting some 


improvement since admission.  Patient does complain of chronic symptoms of 


asthma which apparently is poorly controlled on an outpatient basis.  He does 


complain of frequent flareups as well.  Patient however denies any high fever, 


chills, body aches or myalgias and also denies any sinus symptoms.





Past medical history; 





1.  Asthma


2.  Diabetes and hypertension.





Medications; reviewed.


Allergies; sulfa drugs.


Social history; noncontributory.


Family history; he is single, he does not have any children.  No show any asthma


in the family.


Occupational history; patient is on disability.


Review of systems; denies any headache, seizures, sinus symptoms.  Any 


dysphagia.  Any chest pain or angina.  Complains of cough with wheezing as well 


as mild dyspnea on exertion.  Denies any abdominal pain, nausea vomiting.  Any 


melena or hematochezia.  Any urinary symptoms.  Denies any weight gain.  Denies 


any orthopnea.  Complains of occasional snoring and daytime sleepiness.


General exam; elderly male, looks younger than age.  Currently no distress.  


Laying comfortably in bed.


Date/Time of Note


DATE: 6/9/19 


TIME: 11:37





Past Medical History


Home Meds


Reported Medications


Levalbuterol* (Xopenex* HFA) 15 Gm Inha, 2 PUFFS INH Q4H PRN for WHEEZING AND 


SOB, INHALER


   6/8/19


Atorvastatin Calcium* (Atorvastatin Calcium*) 20 Mg Tablet, 20 MG PO QHS, #30 


TAB


   6/8/19


Furosemide* (Furosemide*) 20 Mg Tablet, 20 MG PO DAILY, #60 TAB


   6/8/19


Venlafaxine Hcl* (Venlafaxine Hcl*) 50 Mg Tablet, 50 MG PO DAILY, TAB


   6/8/19


Sitagliptin* (Januvia*) 100 Mg Tablet, 100 MG PO DAILY, #30 TAB


   6/8/19


Lisinopril* (Lisinopril*) 40 Mg Tablet, 40 MG PO DAILY, #30 TAB


   6/8/19


Tamsulosin Hcl* (Flomax*) 0.4 Mg Cap.er.24h, 0.4 MG PO DAILY, CAP


   6/8/19


Medications





Current Medications


IV Flush (NS 3 ml) 3 ml PER PROTOCOL IV ;  Start 6/8/19 at 20:00


Ondansetron HCl (Zofran Inj) 4 mg Q6H  PRN IV NAUSEA/VOMITING;  Start 6/8/19 at 


20:00


Acetaminophen (Tylenol Tab) 650 mg Q6H  PRN PO .PAIN 1-3 OR TEMP;  Start 6/8/19 


at 20:00


Acetaminophen/ Hydrocodone Bitart (Norco (5/325)) 1 tab Q6H  PRN PO .MOD PAIN 4-


6;  Start 6/8/19 at 20:00


Morphine Sulfate (morphine) 2 mg Q4H  PRN IV .SEVERE PAIN 7-10;  Start 6/8/19 at


20:00


Docusate Sodium (Colace) 100 mg Q12H  PRN PO .CONSTIPATION;  Start 6/8/19 at 


20:00


Zolpidem Tartrate (Ambien) 5 mg QHS  PRN PO .INSOMNIA Last administered on 


6/8/19at 23:08; Admin Dose 5 MG;  Start 6/8/19 at 20:00


Enoxaparin Sodium (Lovenox) 40 mg DAILY SC  Last administered on 6/9/19at 08:19;


Admin Dose 40 MG;  Start 6/9/19 at 09:00


Albuterol/ Ipratropium (Duoneb) 3 ml Q4H RESP THERAPY  PRN HHN SHORTNESS OF 


BREATH Last administered on 6/9/19at 08:32; Admin Dose 3 ML;  Start 6/8/19 at 


20:00


Atorvastatin Calcium (Lipitor) 20 mg QHS PO  Last administered on 6/8/19at 


21:19; Admin Dose 20 MG;  Start 6/8/19 at 21:00


Lisinopril (Zestril) 40 mg DAILY PO  Last administered on 6/9/19at 08:10; Admin 


Dose 40 MG;  Start 6/9/19 at 09:00


Tamsulosin HCl (Flomax) 0.4 mg DAILY PO  Last administered on 6/9/19at 08:10; 


Admin Dose 0.4 MG;  Start 6/9/19 at 09:00


Venlafaxine HCl (Effexor) 50 mg DAILY PO ;  Start 6/9/19 at 09:00


Diagnostic Test (Pha) (Accu-Chek) 1 ea 02 XX  Last administered on 6/9/19at 


02:45; Admin Dose 1 EA;  Start 6/9/19 at 02:00


Insulin Aspart (Novolog Insulin Pen) NOVOLOG *MILD* ALGORITHM WITH MEALS  


BEDTIME SC  Last administered on 6/9/19at 08:19; Admin Dose 3 UNIT;  Start 


6/8/19 at 21:00


Miscellaneous Information 1 ea NOTE XX ;  Start 6/8/19 at 20:30


Glucose (Glutose) 15 gm Q15M  PRN PO DECREASED GLUCOSE;  Start 6/8/19 at 20:30


Glucose (Glutose) 22.5 gm Q15M  PRN PO DECREASED GLUCOSE;  Start 6/8/19 at 20:30


Dextrose (D50w Syringe) 25 ml Q15M  PRN IV DECREASED GLUCOSE;  Start 6/8/19 at 


20:30


Dextrose (D50w Syringe) 50 ml Q15M  PRN IV DECREASED GLUCOSE;  Start 6/8/19 at 


20:30


Glucagon (Glucagen) 1 mg Q15M  PRN IM DECREASED GLUCOSE;  Start 6/8/19 at 20:30


Glucose (Glutose) 15 gm Q15M  PRN BUCCAL DECREASED GLUCOSE;  Start 6/8/19 at 


20:30


Insulin Aspart (Novolog Insulin Pen) 9 unit WITH  MEALS SC ;  Start 6/9/19 at 


11:50


Methylprednisolone Sodium Succinate (Solu-Medrol) 40 mg Q8 IV ;  Start 6/9/19 at


14:00


Budesonide (Pulmicort (Neb)) 0.5 mg BID RESP  THERAPY HHN ;  Start 6/9/19 at 


09:30


Insulin Glargine (Lantus) 20 units DAILY@2000 SC ;  Start 6/9/19 at 20:00


Albuterol/ Ipratropium (Duoneb) 3 ml Q6HWA RESP  THERAPY HHN ;  Start 6/9/19 at 


14:00;  Status UNV


Allergies:  


Coded Allergies:  


     Sulfa (Sulfonamide Antibiotics) (Verified  Allergy, Unknown, 6/8/19)





Social History


Alcohol Use:  rarely


Smoking Status:  Former smoker


Drug Use:  none





Exam/Review of Systems


Exam


Vitals





Vital Signs


  Date      Temp  Pulse  Resp  B/P (MAP)   Pulse Ox  O2          O2 Flow    FiO2


Time                                                 Delivery    Rate


    6/9/19                                                             3.0


     08:34


    6/9/19          110    22                    99  Nasal                    32


     08:32                                           Cannula


    6/9/19  98.5                   121/72


     07:12                           (88)








Intake and Output





6/8/19 6/8/19 6/9/19





1515:00


23:00


07:00





IntakeIntake Total


500 ml





OutputOutput Total


150 ml





BalanceBalance


-150 ml


500 ml











Exam


H EENT exam; supple neck, no JVD.  No lymphadenopathy.  Midline trachea.  No 


thyromegaly.  No neck masses.


Chest exam; bilateral wheezing.  S1-S2 audible, no murmurs.  Regular rhythm.


Abdomen exam; protuberant.  Nontender.  No organomegaly.  Bowel sounds audible. 


There is a very small umbilical hernia present.


Extremity exam; peripheral edema clubbing.  


CNS exam; no focal deficit.





Results


Result Diagram:  


6/9/19 0544                                                                     


          6/9/19 0545





Results 24hrs





Laboratory Tests


Test
                 6/8/19
15:58  6/8/19
16:03      6/8/19
16:05  6/8/19
19:15


Bedside Glucose              172


White Blood                                8.3


Count


Red Blood Count                           5.05


Hemoglobin                                14.3


Hematocrit                               41.8  L


Mean Corpuscular                          82.8


Volume


Mean Corpuscular                         28.3  L


Hemoglobin


Mean Corpuscular  
                      34.2  
  
                 



Hemoglobin
Jillian


nt


Red Cell                                  13.5


Distribution


Width


Platelet Count                             220


Mean Platelet                              9.6


Volume


Immature                                0.500  H


Granulocytes %


Neutrophils %                             62.4


Lymphocytes %                             16.8


Monocytes %                               10.7


Eosinophils %                             9.1  H


Basophils %                                0.5


Nucleated Red                              0.0


Blood Cells %


Immature                                0.040  H


Granulocytes #


Neutrophils #                              5.2


Lymphocytes #                              1.4


Monocytes #                                0.9


Eosinophils #                             0.8  H


Basophils #                                0.0


Nucleated Red                              0.0


Blood Cells #


Prothrombin Time                          13.2


Prothrombin Time                           1.0


Ratio


INR               
                      0.99  
  
                 



International


Normalized
Ratio


Activated         
                      30.9  
  
                 



Partial
Thrombop


last Time


Sodium Level                               138


Potassium Level                           3.2  L


Chloride Level                             105


Carbon Dioxide                              27


Level


Anion Gap                                    6


Blood Urea                                  16


Nitrogen


Creatinine                                1.03


Est Glomerular    
                 > 60  
       
                 



Filtrat


Rate
mL/min


Glucose Level                              181


Calcium Level                              8.7


Total Bilirubin                           1.4  H


Direct Bilirubin                          0.00


Indirect                                  1.4  H


Bilirubin


Aspartate Amino   
                        33  
  
                 



Transf
(AST/SGOT


)


Alanine           
                        29  
  
                 



Aminotransferase



(ALT/SGPT)


Alkaline                                   111


Phosphatase


Creatine Kinase                            153


Creatine Kinase                            0.9


Index


Creatinine                                1.34


Kinase MB (Mass)


Troponin I                          < 0.012


B-Type                                    320  H


Natriuretic


Peptide


Total Protein                              7.1


Albumin                                    3.9


Globulin                                  3.20


Albumin/Globulin                          1.21


Ratio


Amylase Level                               89


Lipase                                     126


POC Venous                                                   1.2


Lactate


Urine Color                                                         YELLOW


Urine Clarity                                                       CLEAR


Urine pH                                                                   5.0


Urine Specific                                                           1.018


Gravity


Urine Ketones                                                       NEGATIVE


Urine Nitrite                                                       NEGATIVE


Urine Bilirubin                                                     NEGATIVE


Urine                                                               NEGATIVE


Urobilinogen


Urine Leukocyte                                                     NEGATIVE


Esterase


Urine Hemoglobin                                                    NEGATIVE


Urine Glucose                                                       NEGATIVE


Urine Total                                                         NEGATIVE


Protein


Test
                 6/8/19
21:00  6/8/19
21:14      6/8/19
23:17  6/9/19
02:53


Blood Gas         Blood arterial
   
             
                 



Specimen Source



Arterial Blood    6/8/2019
8:30:12  
             
                 



Date Drawn
                     PM


Arterial Blood            7.413  
  
             
                 



pH


(Temp
corrected)


Arterial Blood             43.6  
  
             
                 



pCO2


(Temp
correct)


Arterial Blood           103.3  H
  
             
                 



pO2


(Temp
corrected)


Arterial Blood             27.2  H


HCO3


Arterial Blood               2.2


Base Excess


Arterial Blood             97.9  
  
             
                 



Oxygen
Saturatio


n


Sandor Test        ACCEPTAB


Arterial Blood    Right Radial  
   
             
                 



Gas


Puncture
Site


Arterial                    0.8  
  
             
                 



Blood
Carboxyhem


oglobin


Arterial Blood               0.5


Methemoglobin


Blood Gas A-a O2           73.9  H


Differential


Oxyhemoglobin               96.6


Percent


Blood Gas                   37.0


Temperature


Blood Gas Actual             21  
  
             
                 



Respiration
Rate


Blood Gas         NASAL CANNULA


Modality


FiO2                        32.0


Blood Gas         KB


Notified Whom


Blood Gas         6/8/2019
8:42:42  
             
                 



Notified Time
                  PM


Lactic Acid                  1.4                             1.5


Level


Bedside Glucose                            125                            259  H


Test
                 6/9/19
05:44  6/9/19
05:45      6/9/19
06:00  6/9/19
06:20


White Blood               10.9  #H


Count


Red Blood Count             5.23


Hemoglobin                  14.7


Hematocrit                  44.0


Mean Corpuscular            84.1


Volume


Mean Corpuscular           28.1  L


Hemoglobin


Mean Corpuscular           33.4  
  
             
                 



Hemoglobin
Jillian


nt


Red Cell                    13.7


Distribution


Width


Platelet Count               255


Mean Platelet                9.7


Volume


Immature                  0.600  H


Granulocytes %


Neutrophils %


Segmented                   90  H
  
             
                 



Neutrophils


%
(Manual)


Band Neutrophils               3


% (Manual)


Lymphocytes %


Lymphocytes %                 5  L


(Manual)


Monocytes %


Monocytes %                    1


(Manual)


Eosinophils %


Basophils %


Basophils %                    1


(Manual)


Nucleated Red                0.0


Blood Cells %


Immature                  0.070  H


Granulocytes #


Neutrophils #


Neutrophils #               9.8  H


(Manual)


Band Neutrophils             0.3


#


Lymphocytes                 0.5  L


(Manual)


Lymphocytes #


Monocytes #


Monocytes #                 0.1  L


(Manual)


Eosinophils #


Basophils #


Basophils #                 0.1  H


(Manual)


Nucleated Red


Blood Cells #


Platelet          NORMAL


Estimate


Poikilocytosis                2+


Anisocytosis                  1+


Microcytosis                  1+


Hemoglobin A1c              6.3  H


Sodium Level                               142


Potassium Level                            3.6


Chloride Level                             104


Carbon Dioxide                              26


Level


Anion Gap                                   12


Blood Urea                                  20


Nitrogen


Creatinine                               1.26  H


Est Glomerular    
                       57  L
  
                 



Filtrat


Rate
mL/min


Glucose Level                            290  #H


Calcium Level                              9.2


Phosphorus Level                          2.4  L


Magnesium Level                            1.9


Triglycerides                               72


Level


Cholesterol                                130


Level


LDL Cholesterol,                            76


Calculated


HDL Cholesterol                             40


Cholesterol/HDL                            3.2


Ratio


Blood Gas         
                 
             Blood arterial
   



Specimen Source



Arterial Blood    
                 
             6/9/2019
6:05:53  



Date Drawn
                                                     AM


Arterial Blood    
                 
                     7.365  
  



pH


(Temp
corrected)


Arterial Blood    
                 
                      41.4  
  



pCO2


(Temp
correct)


Arterial Blood    
                 
                    112.2  H
  



pO2


(Temp
corrected)


Arterial Blood                                              23.1


HCO3


Arterial Blood                                              -2.1


Base Excess


Arterial Blood    
                 
                      98.0  
  



Oxygen
Saturatio


n


Sandor Test                                        ACCEPTAB


Arterial Blood    
                 
             Right Radial  
   



Gas


Puncture
Site


Arterial          
                 
                       0.1  
  



Blood
Carboxyhem


oglobin


Arterial Blood                                               0.5


Methemoglobin


Blood Gas A-a O2                                           89.2  H


Differential


Oxyhemoglobin                                               97.4


Percent


Blood Gas                                                   37.0


Temperature


Blood Gas                                                   18.0


Respiration Rate


Blood Gas Actual  
                 
                        20  
  



Respiration
Rate


Blood Gas                                         MASK - BIPAP


Modality


FiO2                                                        35.0


Blood Gas                                                   15/5


IPAP/EPAP Ratio


Blood Gas                                         DAPHNEY MALONEY


Notified Whom


Blood Gas         
                 
             6/9/2019
6:17:39  



Notified Time
                                                  AM


Bedside Glucose                                                           283  H


Test
                 6/9/19
08:03  
             
                 



Bedside Glucose             258  H








Medications


Medication





Current Medications


IV Flush (NS 3 ml) 3 ml PER PROTOCOL IV ;  Start 6/8/19 at 20:00


Ondansetron HCl (Zofran Inj) 4 mg Q6H  PRN IV NAUSEA/VOMITING;  Start 6/8/19 at 


20:00


Acetaminophen (Tylenol Tab) 650 mg Q6H  PRN PO .PAIN 1-3 OR TEMP;  Start 6/8/19 


at 20:00


Acetaminophen/ Hydrocodone Bitart (Norco (5/325)) 1 tab Q6H  PRN PO .MOD PAIN 4-


6;  Start 6/8/19 at 20:00


Morphine Sulfate (morphine) 2 mg Q4H  PRN IV .SEVERE PAIN 7-10;  Start 6/8/19 at


20:00


Docusate Sodium (Colace) 100 mg Q12H  PRN PO .CONSTIPATION;  Start 6/8/19 at 


20:00


Zolpidem Tartrate (Ambien) 5 mg QHS  PRN PO .INSOMNIA Last administered on 


6/8/19at 23:08; Admin Dose 5 MG;  Start 6/8/19 at 20:00


Enoxaparin Sodium (Lovenox) 40 mg DAILY SC  Last administered on 6/9/19at 08:19;


Admin Dose 40 MG;  Start 6/9/19 at 09:00


Albuterol/ Ipratropium (Duoneb) 3 ml Q4H RESP THERAPY  PRN HHN SHORTNESS OF 


BREATH Last administered on 6/9/19at 08:32; Admin Dose 3 ML;  Start 6/8/19 at 


20:00


Atorvastatin Calcium (Lipitor) 20 mg QHS PO  Last administered on 6/8/19at 


21:19; Admin Dose 20 MG;  Start 6/8/19 at 21:00


Lisinopril (Zestril) 40 mg DAILY PO  Last administered on 6/9/19at 08:10; Admin 


Dose 40 MG;  Start 6/9/19 at 09:00


Tamsulosin HCl (Flomax) 0.4 mg DAILY PO  Last administered on 6/9/19at 08:10; 


Admin Dose 0.4 MG;  Start 6/9/19 at 09:00


Venlafaxine HCl (Effexor) 50 mg DAILY PO ;  Start 6/9/19 at 09:00


Diagnostic Test (Pha) (Accu-Chek) 1 ea 02 XX  Last administered on 6/9/19at 


02:45; Admin Dose 1 EA;  Start 6/9/19 at 02:00


Insulin Aspart (Novolog Insulin Pen) NOVOLOG *MILD* ALGORITHM WITH MEALS  


BEDTIME SC  Last administered on 6/9/19at 08:19; Admin Dose 3 UNIT;  Start 


6/8/19 at 21:00


Miscellaneous Information 1 ea NOTE XX ;  Start 6/8/19 at 20:30


Glucose (Glutose) 15 gm Q15M  PRN PO DECREASED GLUCOSE;  Start 6/8/19 at 20:30


Glucose (Glutose) 22.5 gm Q15M  PRN PO DECREASED GLUCOSE;  Start 6/8/19 at 20:30


Dextrose (D50w Syringe) 25 ml Q15M  PRN IV DECREASED GLUCOSE;  Start 6/8/19 at 


20:30


Dextrose (D50w Syringe) 50 ml Q15M  PRN IV DECREASED GLUCOSE;  Start 6/8/19 at 


20:30


Glucagon (Glucagen) 1 mg Q15M  PRN IM DECREASED GLUCOSE;  Start 6/8/19 at 20:30


Glucose (Glutose) 15 gm Q15M  PRN BUCCAL DECREASED GLUCOSE;  Start 6/8/19 at 


20:30


Insulin Aspart (Novolog Insulin Pen) 9 unit WITH  MEALS SC ;  Start 6/9/19 at 


11:50


Methylprednisolone Sodium Succinate (Solu-Medrol) 40 mg Q8 IV ;  Start 6/9/19 at


14:00


Budesonide (Pulmicort (Neb)) 0.5 mg BID RESP  THERAPY HHN ;  Start 6/9/19 at 


09:30


Insulin Glargine (Lantus) 20 units DAILY@2000 SC ;  Start 6/9/19 at 20:00


Albuterol/ Ipratropium (Duoneb) 3 ml Q6HWA RESP  THERAPY HHN ;  Start 6/9/19 at 


14:00;  Status PARKER PASCAL                     Jun 9, 2019 11:40

## 2019-06-09 NOTE — RADRPT
Echocardiogram Report

 

Patient Name: PETEY OSUNAPatient ID: [id number]

: 1950 (69y )Study Date: 2019 9:15:00 AM

Gender: MAccession #: [id number]

Tech: [id number]                                Location: [facility]

Ref.Physician: ANA CRISTINA ROGER            Height(Cm): 165            

 

BSA: 2.05Weight(Kg): 91.6

Quality: Technically Difficult StudyOrder Physician: ANA CRISTINA ROGER

Account #:

 

 

Procedures:

 

Echocardiographic Report:

Transthoracic echocardiogram with complete 2D, M-Mode, and doppler 

examination.

 

Indications:

 

Congestive Heart Failure.

 

 

Measurements:

2D/M Mode                                          Doppler                                           
    

Measurement    Value    Normal Range               Measurement      Value    Normal Range            
    

LVIDd 2D       4.5      [ 4.2 - 5.8 ] cm           AV Peak Jonathon      1.2      [ 100.0 - 170.0 ] cm/sec
    

LVIDs 2D       3.3      [ 2.5 - 4.0 ] cm           AV Peak PG       5.0      [ 2.0 - 9.0 ] mmHg      
    

LVPWd 2D       1.1      [ 0.6 - 1.0 ] cm           LVOT Peak Jonathon    0.9      [ 70.0 - 110.0 ] cm/sec 
    

IVSd 2D        1.2      [ 0.6 - 1.0 ] cm           LVOT Peak PG     3.0      [ 2.0 - 6.0 ] mmHg      
    

AoR Diam 2D    4.0      [ 2.6 - 3.4 ] cm           Lat E` Jonathon       0.1      [ 10.0 - 15.0 ] cm/sec  
    

EF 2D          53.9     [ 52.0 - 72.0 ] percent                     

LA Dimen 2D    4.8      [ 3.0 - 4.0 ] cm                            

 

Findings:

 

Left Ventricle:

Normal left ventricular cavity size. Mild concentric left ventricular 

hypertrophy. Mild global left ventricular systolic dysfunction. Ejection 

fraction is visually estimated at 45 %. Abnormal Diastolic Function, 

patient had tachycardia throughout exam.

 

Right Ventricle:

Normal right ventricular size.

 

Left Atrium:

There is mild enlargement of left atrium.

 

Right Atrium:

The right atrium is normal in size.

 

Atrial Septum:

Normal atrial septum.

 

Mitral Valve:

Normal appearance of the mitral valve. No mitral valve regurgitation is 

seen.

 

Aortic Valve:

Normal appearance of the aortic valve. Trace aortic valve regurgitation.

 

Tricuspid Valve:

Normal appearance and function of the tricuspid valve with trace 

physiologic regurgitation.

 

Pulmonic Valve:

Pulmonic valve not well visualized.

 

Pericardium:

Normal pericardium with no significant pericardial effusion.

 

Aorta:

Sinus of valsalva is mildly dilated. Sinus of valsalva 4.00 cm.

 

IVC:

Normal size and normal respiratory collapse consistent with normal right 

atrial pressure.

 

Pulmonary Artery:

Not well visualized.

 

 

Conclusions:

 

Normal left ventricular cavity size. Mild concentric left ventricular 

hypertrophy. Mild global left ventricular systolic dysfunction. Ejection 

fraction is visually estimated at 45 %. Abnormal Diastolic Function, 

patient had tachycardia throughout exam.

 

Sinus of valsalva is mildly dilated. Sinus of valsalva 4.00 cm.

 

Electronically Signed By:

 

Javi Leigh

2019 15:10:25 PDT

## 2019-06-09 NOTE — PN
Date/Time of Note


Date/Time of Note


DATE: 6/9/19 


TIME: 13:01





Assessment/Plan


VTE Prophylaxis


Risk score (from Ns)>0 risk:  4


SCD applied (from Ns):  Yes


Pharmacological prophylaxis:  LMWH





Lines/Catheters


IV Catheter Type (from Nrs):  Saline Lock


Urinary Cath still in place:  No





Assessment/Plan


Hospital Course


1.  Acute on chronic hypoxemic respiratory failure secondary to asthma exace


rbation and mild decompensated CHF


Continue steroids with taper down


Continue breathing treatments and have added Pulmicort


Patient uses only rescue inhalers and will likely need long-acting beta agonist 


and inhaled steroids upon DC


Pulmonology consultation appreciated, Zithromax p.o. has been added


Status post Lasix 40 mg IV, transition back to patient's home Lasix 20 mg daily 


starting tomorrow





2.  History of CHF with mild decompensation


Follow-up on 2D echo


Status post Lasix IV, transition back to home Lasix





3.  Diabetes


A1c 6.3


Sugars are currently elevated secondary to steroids, tapering down steroid dose


Increase scheduled basal and mealtime insulin, continue sliding scale


Hold home Januvia





4.  Hypertension


Continue home meds





5.  BPH


Continue home Flomax





6.  Dyslipidemia


Continue statin





7.  Hypokalemia


Replete





8.  Morbid obesity


Lifestyle changes





Prophylaxis: Lovenox





DC planning: Not stable for DC with persistent wheezing


Result Diagram:  


6/9/19 0544                                                                     


          6/9/19 0545





Results 24hrs





Laboratory Tests


Test
                 6/8/19
15:58  6/8/19
16:03      6/8/19
16:05  6/8/19
19:15


Bedside Glucose              172


White Blood                                8.3


Count


Red Blood Count                           5.05


Hemoglobin                                14.3


Hematocrit                               41.8  L


Mean Corpuscular                          82.8


Volume


Mean Corpuscular                         28.3  L


Hemoglobin


Mean Corpuscular  
                      34.2  
  
                 



Hemoglobin
Jillian


nt


Red Cell                                  13.5


Distribution


Width


Platelet Count                             220


Mean Platelet                              9.6


Volume


Immature                                0.500  H


Granulocytes %


Neutrophils %                             62.4


Lymphocytes %                             16.8


Monocytes %                               10.7


Eosinophils %                             9.1  H


Basophils %                                0.5


Nucleated Red                              0.0


Blood Cells %


Immature                                0.040  H


Granulocytes #


Neutrophils #                              5.2


Lymphocytes #                              1.4


Monocytes #                                0.9


Eosinophils #                             0.8  H


Basophils #                                0.0


Nucleated Red                              0.0


Blood Cells #


Prothrombin Time                          13.2


Prothrombin Time                           1.0


Ratio


INR               
                      0.99  
  
                 



International


Normalized
Ratio


Activated         
                      30.9  
  
                 



Partial
Thrombop


last Time


Sodium Level                               138


Potassium Level                           3.2  L


Chloride Level                             105


Carbon Dioxide                              27


Level


Anion Gap                                    6


Blood Urea                                  16


Nitrogen


Creatinine                                1.03


Est Glomerular    
                 > 60  
       
                 



Filtrat


Rate
mL/min


Glucose Level                              181


Calcium Level                              8.7


Total Bilirubin                           1.4  H


Direct Bilirubin                          0.00


Indirect                                  1.4  H


Bilirubin


Aspartate Amino   
                        33  
  
                 



Transf
(AST/SGOT


)


Alanine           
                        29  
  
                 



Aminotransferase



(ALT/SGPT)


Alkaline                                   111


Phosphatase


Creatine Kinase                            153


Creatine Kinase                            0.9


Index


Creatinine                                1.34


Kinase MB (Mass)


Troponin I                          < 0.012


B-Type                                    320  H


Natriuretic


Peptide


Total Protein                              7.1


Albumin                                    3.9


Globulin                                  3.20


Albumin/Globulin                          1.21


Ratio


Amylase Level                               89


Lipase                                     126


POC Venous                                                   1.2


Lactate


Urine Color                                                         YELLOW


Urine Clarity                                                       CLEAR


Urine pH                                                                   5.0


Urine Specific                                                           1.018


Gravity


Urine Ketones                                                       NEGATIVE


Urine Nitrite                                                       NEGATIVE


Urine Bilirubin                                                     NEGATIVE


Urine                                                               NEGATIVE


Urobilinogen


Urine Leukocyte                                                     NEGATIVE


Esterase


Urine Hemoglobin                                                    NEGATIVE


Urine Glucose                                                       NEGATIVE


Urine Total                                                         NEGATIVE


Protein


Test
                 6/8/19
21:00  6/8/19
21:14      6/8/19
23:17  6/9/19
02:53


Blood Gas         Blood arterial
   
             
                 



Specimen Source



Arterial Blood    6/8/2019
8:30:12  
             
                 



Date Drawn
                     PM


Arterial Blood            7.413  
  
             
                 



pH


(Temp
corrected)


Arterial Blood             43.6  
  
             
                 



pCO2


(Temp
correct)


Arterial Blood           103.3  H
  
             
                 



pO2


(Temp
corrected)


Arterial Blood             27.2  H


HCO3


Arterial Blood               2.2


Base Excess


Arterial Blood             97.9  
  
             
                 



Oxygen
Saturatio


n


Sandor Test        ACCEPTAB


Arterial Blood    Right Radial  
   
             
                 



Gas


Puncture
Site


Arterial                    0.8  
  
             
                 



Blood
Carboxyhem


oglobin


Arterial Blood               0.5


Methemoglobin


Blood Gas A-a O2           73.9  H


Differential


Oxyhemoglobin               96.6


Percent


Blood Gas                   37.0


Temperature


Blood Gas Actual             21  
  
             
                 



Respiration
Rate


Blood Gas         NASAL CANNULA


Modality


FiO2                        32.0


Blood Gas         KB


Notified Whom


Blood Gas         6/8/2019
8:42:42  
             
                 



Notified Time
                  PM


Lactic Acid                  1.4                             1.5


Level


Bedside Glucose                            125                            259  H


Test
                 6/9/19
05:44  6/9/19
05:45      6/9/19
06:00  6/9/19
06:20


White Blood               10.9  #H


Count


Red Blood Count             5.23


Hemoglobin                  14.7


Hematocrit                  44.0


Mean Corpuscular            84.1


Volume


Mean Corpuscular           28.1  L


Hemoglobin


Mean Corpuscular           33.4  
  
             
                 



Hemoglobin
Jillian


nt


Red Cell                    13.7


Distribution


Width


Platelet Count               255


Mean Platelet                9.7


Volume


Immature                  0.600  H


Granulocytes %


Neutrophils %


Segmented                   90  H
  
             
                 



Neutrophils


%
(Manual)


Band Neutrophils               3


% (Manual)


Lymphocytes %


Lymphocytes %                 5  L


(Manual)


Monocytes %


Monocytes %                    1


(Manual)


Eosinophils %


Basophils %


Basophils %                    1


(Manual)


Nucleated Red                0.0


Blood Cells %


Immature                  0.070  H


Granulocytes #


Neutrophils #


Neutrophils #               9.8  H


(Manual)


Band Neutrophils             0.3


#


Lymphocytes                 0.5  L


(Manual)


Lymphocytes #


Monocytes #


Monocytes #                 0.1  L


(Manual)


Eosinophils #


Basophils #


Basophils #                 0.1  H


(Manual)


Nucleated Red


Blood Cells #


Platelet          NORMAL


Estimate


Poikilocytosis                2+


Anisocytosis                  1+


Microcytosis                  1+


Hemoglobin A1c              6.3  H


Sodium Level                               142


Potassium Level                            3.6


Chloride Level                             104


Carbon Dioxide                              26


Level


Anion Gap                                   12


Blood Urea                                  20


Nitrogen


Creatinine                               1.26  H


Est Glomerular    
                       57  L
  
                 



Filtrat


Rate
mL/min


Glucose Level                            290  #H


Calcium Level                              9.2


Phosphorus Level                          2.4  L


Magnesium Level                            1.9


Triglycerides                               72


Level


Cholesterol                                130


Level


LDL Cholesterol,                            76


Calculated


HDL Cholesterol                             40


Cholesterol/HDL                            3.2


Ratio


Blood Gas         
                 
             Blood arterial
   



Specimen Source



Arterial Blood    
                 
             6/9/2019
6:05:53  



Date Drawn
                                                     AM


Arterial Blood    
                 
                     7.365  
  



pH


(Temp
corrected)


Arterial Blood    
                 
                      41.4  
  



pCO2


(Temp
correct)


Arterial Blood    
                 
                    112.2  H
  



pO2


(Temp
corrected)


Arterial Blood                                              23.1


HCO3


Arterial Blood                                              -2.1


Base Excess


Arterial Blood    
                 
                      98.0  
  



Oxygen
Saturatio


n


Sandor Test                                        ACCEPTAB


Arterial Blood    
                 
             Right Radial  
   



Gas


Puncture
Site


Arterial          
                 
                       0.1  
  



Blood
Carboxyhem


oglobin


Arterial Blood                                               0.5


Methemoglobin


Blood Gas A-a O2                                           89.2  H


Differential


Oxyhemoglobin                                               97.4


Percent


Blood Gas                                                   37.0


Temperature


Blood Gas                                                   18.0


Respiration Rate


Blood Gas Actual  
                 
                        20  
  



Respiration
Rate


Blood Gas                                         MASK - BIPAP


Modality


FiO2                                                        35.0


Blood Gas                                                   15/5


IPAP/EPAP Ratio


Blood Gas                                         DAPHNEY MALONEY


Notified Whom


Blood Gas         
                 
             6/9/2019
6:17:39  



Notified Time
                                                  AM


Bedside Glucose                                                           283  H


Test
                 6/9/19
08:03  6/9/19
11:36  
                 



Bedside Glucose             258  H        346  H








Subjective


24 Hr Interval Summary


Respiratory:  shortness of breath





Exam/Review of Systems


Exam


Vitals





Vital Signs


  Date      Temp  Pulse  Resp  B/P (MAP)   Pulse Ox  O2          O2 Flow    FiO2


Time                                                 Delivery    Rate


    6/9/19          103


     12:38


    6/9/19  97.1           20      129/78        92


     11:51                           (95)


    6/9/19                                                             3.0


     08:34


    6/9/19                                           Nasal                    32


     08:32                                           Cannula








Intake and Output





6/8/19 6/8/19 6/9/19





1515:00


23:00


07:00





IntakeIntake Total


500 ml





OutputOutput Total


150 ml





BalanceBalance


-150 ml


500 ml











Constitutional:  alert, oriented


Respiratory:  wheezing


Cardiovascular:  regular rate and rhythm


Gastrointestinal:  soft; 


   No distended


Musculoskeletal:  nl extremities to inspection





Results


Results 24hrs





Laboratory Tests


Test
                 6/8/19
15:58  6/8/19
16:03      6/8/19
16:05  6/8/19
19:15


Bedside Glucose              172


White Blood                                8.3


Count


Red Blood Count                           5.05


Hemoglobin                                14.3


Hematocrit                               41.8  L


Mean Corpuscular                          82.8


Volume


Mean Corpuscular                         28.3  L


Hemoglobin


Mean Corpuscular  
                      34.2  
  
                 



Hemoglobin
Jillian


nt


Red Cell                                  13.5


Distribution


Width


Platelet Count                             220


Mean Platelet                              9.6


Volume


Immature                                0.500  H


Granulocytes %


Neutrophils %                             62.4


Lymphocytes %                             16.8


Monocytes %                               10.7


Eosinophils %                             9.1  H


Basophils %                                0.5


Nucleated Red                              0.0


Blood Cells %


Immature                                0.040  H


Granulocytes #


Neutrophils #                              5.2


Lymphocytes #                              1.4


Monocytes #                                0.9


Eosinophils #                             0.8  H


Basophils #                                0.0


Nucleated Red                              0.0


Blood Cells #


Prothrombin Time                          13.2


Prothrombin Time                           1.0


Ratio


INR               
                      0.99  
  
                 



International


Normalized
Ratio


Activated         
                      30.9  
  
                 



Partial
Thrombop


last Time


Sodium Level                               138


Potassium Level                           3.2  L


Chloride Level                             105


Carbon Dioxide                              27


Level


Anion Gap                                    6


Blood Urea                                  16


Nitrogen


Creatinine                                1.03


Est Glomerular    
                 > 60  
       
                 



Filtrat


Rate
mL/min


Glucose Level                              181


Calcium Level                              8.7


Total Bilirubin                           1.4  H


Direct Bilirubin                          0.00


Indirect                                  1.4  H


Bilirubin


Aspartate Amino   
                        33  
  
                 



Transf
(AST/SGOT


)


Alanine           
                        29  
  
                 



Aminotransferase



(ALT/SGPT)


Alkaline                                   111


Phosphatase


Creatine Kinase                            153


Creatine Kinase                            0.9


Index


Creatinine                                1.34


Kinase MB (Mass)


Troponin I                          < 0.012


B-Type                                    320  H


Natriuretic


Peptide


Total Protein                              7.1


Albumin                                    3.9


Globulin                                  3.20


Albumin/Globulin                          1.21


Ratio


Amylase Level                               89


Lipase                                     126


POC Venous                                                   1.2


Lactate


Urine Color                                                         YELLOW


Urine Clarity                                                       CLEAR


Urine pH                                                                   5.0


Urine Specific                                                           1.018


Gravity


Urine Ketones                                                       NEGATIVE


Urine Nitrite                                                       NEGATIVE


Urine Bilirubin                                                     NEGATIVE


Urine                                                               NEGATIVE


Urobilinogen


Urine Leukocyte                                                     NEGATIVE


Esterase


Urine Hemoglobin                                                    NEGATIVE


Urine Glucose                                                       NEGATIVE


Urine Total                                                         NEGATIVE


Protein


Test
                 6/8/19
21:00  6/8/19
21:14      6/8/19
23:17  6/9/19
02:53


Blood Gas         Blood arterial
   
             
                 



Specimen Source



Arterial Blood    6/8/2019
8:30:12  
             
                 



Date Drawn
                     PM


Arterial Blood            7.413  
  
             
                 



pH


(Temp
corrected)


Arterial Blood             43.6  
  
             
                 



pCO2


(Temp
correct)


Arterial Blood           103.3  H
  
             
                 



pO2


(Temp
corrected)


Arterial Blood             27.2  H


HCO3


Arterial Blood               2.2


Base Excess


Arterial Blood             97.9  
  
             
                 



Oxygen
Saturatio


n


Sandor Test        ACCEPTAB


Arterial Blood    Right Radial  
   
             
                 



Gas


Puncture
Site


Arterial                    0.8  
  
             
                 



Blood
Carboxyhem


oglobin


Arterial Blood               0.5


Methemoglobin


Blood Gas A-a O2           73.9  H


Differential


Oxyhemoglobin               96.6


Percent


Blood Gas                   37.0


Temperature


Blood Gas Actual             21  
  
             
                 



Respiration
Rate


Blood Gas         NASAL CANNULA


Modality


FiO2                        32.0


Blood Gas         KB


Notified Whom


Blood Gas         6/8/2019
8:42:42  
             
                 



Notified Time
                  PM


Lactic Acid                  1.4                             1.5


Level


Bedside Glucose                            125                            259  H


Test
                 6/9/19
05:44  6/9/19
05:45      6/9/19
06:00  6/9/19
06:20


White Blood               10.9  #H


Count


Red Blood Count             5.23


Hemoglobin                  14.7


Hematocrit                  44.0


Mean Corpuscular            84.1


Volume


Mean Corpuscular           28.1  L


Hemoglobin


Mean Corpuscular           33.4  
  
             
                 



Hemoglobin
Jillian


nt


Red Cell                    13.7


Distribution


Width


Platelet Count               255


Mean Platelet                9.7


Volume


Immature                  0.600  H


Granulocytes %


Neutrophils %


Segmented                   90  H
  
             
                 



Neutrophils


%
(Manual)


Band Neutrophils               3


% (Manual)


Lymphocytes %


Lymphocytes %                 5  L


(Manual)


Monocytes %


Monocytes %                    1


(Manual)


Eosinophils %


Basophils %


Basophils %                    1


(Manual)


Nucleated Red                0.0


Blood Cells %


Immature                  0.070  H


Granulocytes #


Neutrophils #


Neutrophils #               9.8  H


(Manual)


Band Neutrophils             0.3


#


Lymphocytes                 0.5  L


(Manual)


Lymphocytes #


Monocytes #


Monocytes #                 0.1  L


(Manual)


Eosinophils #


Basophils #


Basophils #                 0.1  H


(Manual)


Nucleated Red


Blood Cells #


Platelet          NORMAL


Estimate


Poikilocytosis                2+


Anisocytosis                  1+


Microcytosis                  1+


Hemoglobin A1c              6.3  H


Sodium Level                               142


Potassium Level                            3.6


Chloride Level                             104


Carbon Dioxide                              26


Level


Anion Gap                                   12


Blood Urea                                  20


Nitrogen


Creatinine                               1.26  H


Est Glomerular    
                       57  L
  
                 



Filtrat


Rate
mL/min


Glucose Level                            290  #H


Calcium Level                              9.2


Phosphorus Level                          2.4  L


Magnesium Level                            1.9


Triglycerides                               72


Level


Cholesterol                                130


Level


LDL Cholesterol,                            76


Calculated


HDL Cholesterol                             40


Cholesterol/HDL                            3.2


Ratio


Blood Gas         
                 
             Blood arterial
   



Specimen Source



Arterial Blood    
                 
             6/9/2019
6:05:53  



Date Drawn
                                                     AM


Arterial Blood    
                 
                     7.365  
  



pH


(Temp
corrected)


Arterial Blood    
                 
                      41.4  
  



pCO2


(Temp
correct)


Arterial Blood    
                 
                    112.2  H
  



pO2


(Temp
corrected)


Arterial Blood                                              23.1


HCO3


Arterial Blood                                              -2.1


Base Excess


Arterial Blood    
                 
                      98.0  
  



Oxygen
Saturatio


n


Sandor Test                                        ACCEPTAB


Arterial Blood    
                 
             Right Radial  
   



Gas


Puncture
Site


Arterial          
                 
                       0.1  
  



Blood
Carboxyhem


oglobin


Arterial Blood                                               0.5


Methemoglobin


Blood Gas A-a O2                                           89.2  H


Differential


Oxyhemoglobin                                               97.4


Percent


Blood Gas                                                   37.0


Temperature


Blood Gas                                                   18.0


Respiration Rate


Blood Gas Actual  
                 
                        20  
  



Respiration
Rate


Blood Gas                                         MASK - BIPAP


Modality


FiO2                                                        35.0


Blood Gas                                                   15/5


IPAP/EPAP Ratio


Blood Gas                                         DAPHNEY MALONEY


Notified Whom


Blood Gas         
                 
             6/9/2019
6:17:39  



Notified Time
                                                  AM


Bedside Glucose                                                           283  H


Test
                 6/9/19
08:03  6/9/19
11:36  
                 



Bedside Glucose             258  H        346  H








Medications


Medication





Current Medications


IV Flush (NS 3 ml) 3 ml PER PROTOCOL IV ;  Start 6/8/19 at 20:00


Ondansetron HCl (Zofran Inj) 4 mg Q6H  PRN IV NAUSEA/VOMITING;  Start 6/8/19 at 


20:00


Acetaminophen (Tylenol Tab) 650 mg Q6H  PRN PO .PAIN 1-3 OR TEMP;  Start 6/8/19 


at 20:00


Acetaminophen/ Hydrocodone Bitart (Norco (5/325)) 1 tab Q6H  PRN PO .MOD PAIN 4-


6;  Start 6/8/19 at 20:00


Morphine Sulfate (morphine) 2 mg Q4H  PRN IV .SEVERE PAIN 7-10;  Start 6/8/19 at


20:00


Docusate Sodium (Colace) 100 mg Q12H  PRN PO .CONSTIPATION;  Start 6/8/19 at 


20:00


Zolpidem Tartrate (Ambien) 5 mg QHS  PRN PO .INSOMNIA Last administered on 


6/8/19at 23:08; Admin Dose 5 MG;  Start 6/8/19 at 20:00


Enoxaparin Sodium (Lovenox) 40 mg DAILY SC  Last administered on 6/9/19at 08:19;


Admin Dose 40 MG;  Start 6/9/19 at 09:00


Albuterol/ Ipratropium (Duoneb) 3 ml Q4H RESP THERAPY  PRN HHN SHORTNESS OF 


BREATH Last administered on 6/9/19at 08:32; Admin Dose 3 ML;  Start 6/8/19 at 


20:00


Atorvastatin Calcium (Lipitor) 20 mg QHS PO  Last administered on 6/8/19at 


21:19; Admin Dose 20 MG;  Start 6/8/19 at 21:00


Lisinopril (Zestril) 40 mg DAILY PO  Last administered on 6/9/19at 08:10; Admin 


Dose 40 MG;  Start 6/9/19 at 09:00


Tamsulosin HCl (Flomax) 0.4 mg DAILY PO  Last administered on 6/9/19at 08:10; 


Admin Dose 0.4 MG;  Start 6/9/19 at 09:00


Venlafaxine HCl (Effexor) 50 mg DAILY PO ;  Start 6/9/19 at 09:00


Diagnostic Test (Pha) (Accu-Chek) 1 ea 02 XX  Last administered on 6/9/19at 02:4


5; Admin Dose 1 EA;  Start 6/9/19 at 02:00


Insulin Aspart (Novolog Insulin Pen) NOVOLOG *MILD* ALGORITHM WITH MEALS  


BEDTIME SC  Last administered on 6/9/19at 12:31; Admin Dose 5 UNIT;  Start 


6/8/19 at 21:00


Miscellaneous Information 1 ea NOTE XX ;  Start 6/8/19 at 20:30


Glucose (Glutose) 15 gm Q15M  PRN PO DECREASED GLUCOSE;  Start 6/8/19 at 20:30


Glucose (Glutose) 22.5 gm Q15M  PRN PO DECREASED GLUCOSE;  Start 6/8/19 at 20:30


Dextrose (D50w Syringe) 25 ml Q15M  PRN IV DECREASED GLUCOSE;  Start 6/8/19 at 


20:30


Dextrose (D50w Syringe) 50 ml Q15M  PRN IV DECREASED GLUCOSE;  Start 6/8/19 at 


20:30


Glucagon (Glucagen) 1 mg Q15M  PRN IM DECREASED GLUCOSE;  Start 6/8/19 at 20:30


Glucose (Glutose) 15 gm Q15M  PRN BUCCAL DECREASED GLUCOSE;  Start 6/8/19 at 


20:30


Insulin Aspart (Novolog Insulin Pen) 9 unit WITH  MEALS SC  Last administered on


6/9/19at 12:31; Admin Dose 9 UNIT;  Start 6/9/19 at 11:50


Methylprednisolone Sodium Succinate (Solu-Medrol) 40 mg Q8 IV ;  Start 6/9/19 at


14:00


Budesonide (Pulmicort (Neb)) 0.5 mg BID RESP  THERAPY N ;  Start 6/9/19 at 


09:30


Insulin Glargine (Lantus) 20 units DAILY@2000 SC ;  Start 6/9/19 at 20:00


Albuterol/ Ipratropium (Duoneb) 3 ml Q6HWA RESP  THERAPY N ;  Start 6/9/19 at 


14:00


Azithromycin (Zithromax) 250 mg NOW PO ;  Start 6/10/19 at 09:00;  Stop 6/13/19 


at 09:01











ANA CRISTINA ROGER                   Jun 9, 2019 13:07

## 2019-06-10 VITALS — RESPIRATION RATE: 20 BRPM | HEART RATE: 102 BPM | SYSTOLIC BLOOD PRESSURE: 122 MMHG | DIASTOLIC BLOOD PRESSURE: 82 MMHG

## 2019-06-10 VITALS — HEART RATE: 89 BPM

## 2019-06-10 VITALS — RESPIRATION RATE: 18 BRPM | HEART RATE: 96 BPM | SYSTOLIC BLOOD PRESSURE: 139 MMHG | DIASTOLIC BLOOD PRESSURE: 92 MMHG

## 2019-06-10 VITALS — HEART RATE: 132 BPM

## 2019-06-10 VITALS — SYSTOLIC BLOOD PRESSURE: 139 MMHG | RESPIRATION RATE: 20 BRPM | HEART RATE: 52 BPM | DIASTOLIC BLOOD PRESSURE: 74 MMHG

## 2019-06-10 VITALS — RESPIRATION RATE: 20 BRPM | HEART RATE: 96 BPM | SYSTOLIC BLOOD PRESSURE: 126 MMHG | DIASTOLIC BLOOD PRESSURE: 83 MMHG

## 2019-06-10 VITALS — HEART RATE: 108 BPM

## 2019-06-10 VITALS — HEART RATE: 98 BPM

## 2019-06-10 VITALS — RESPIRATION RATE: 18 BRPM | DIASTOLIC BLOOD PRESSURE: 72 MMHG | SYSTOLIC BLOOD PRESSURE: 119 MMHG | HEART RATE: 99 BPM

## 2019-06-10 VITALS — DIASTOLIC BLOOD PRESSURE: 78 MMHG | RESPIRATION RATE: 18 BRPM | HEART RATE: 89 BPM | SYSTOLIC BLOOD PRESSURE: 136 MMHG

## 2019-06-10 VITALS — HEART RATE: 96 BPM

## 2019-06-10 VITALS — HEART RATE: 107 BPM

## 2019-06-10 LAB
ADD MAN DIFF?: NO
ANION GAP: 12 (ref 5–13)
BASOPHIL #: 0 10^3/UL (ref 0–0.1)
BASOPHILS %: 0.2 % (ref 0–2)
BLOOD UREA NITROGEN: 37 MG/DL (ref 7–20)
CALCIUM: 8.8 MG/DL (ref 8.4–10.2)
CARBON DIOXIDE: 24 MMOL/L (ref 21–31)
CHLORIDE: 103 MMOL/L (ref 97–110)
CREATININE: 1.41 MG/DL (ref 0.61–1.24)
EOSINOPHILS #: 0 10^3/UL (ref 0–0.5)
EOSINOPHILS %: 0 % (ref 0–7)
GLUCOSE: 296 MG/DL (ref 70–220)
HEMATOCRIT: 41.6 % (ref 42–52)
HEMOGLOBIN: 14.2 G/DL (ref 14–18)
IMMATURE GRANS #M: 0.17 10^3/UL (ref 0–0.03)
IMMATURE GRANS % (M): 0.8 % (ref 0–0.43)
LYMPHOCYTES #: 1.1 10^3/UL (ref 0.8–2.9)
LYMPHOCYTES %: 5.5 % (ref 15–51)
MAGNESIUM: 1.7 MG/DL (ref 1.7–2.5)
MEAN CORPUSCULAR HEMOGLOBIN: 28.6 PG (ref 29–33)
MEAN CORPUSCULAR HGB CONC: 34.1 G/DL (ref 32–37)
MEAN CORPUSCULAR VOLUME: 83.9 FL (ref 82–101)
MEAN PLATELET VOLUME: 9.8 FL (ref 7.4–10.4)
MONOCYTE #: 1.1 10^3/UL (ref 0.3–0.9)
MONOCYTES %: 5.5 % (ref 0–11)
NEUTROPHIL #: 18.1 10^3/UL (ref 1.6–7.5)
NEUTROPHILS %: 88 % (ref 39–77)
NUCLEATED RED BLOOD CELLS #: 0 10^3/UL (ref 0–0)
NUCLEATED RED BLOOD CELLS%: 0 /100WBC (ref 0–0)
PHOSPHORUS: 3.1 MG/DL (ref 2.5–4.9)
PLATELET COUNT: 261 10^3/UL (ref 140–415)
POTASSIUM: 3.6 MMOL/L (ref 3.5–5.1)
RED BLOOD COUNT: 4.96 10^6/UL (ref 4.7–6.1)
RED CELL DISTRIBUTION WIDTH: 14 % (ref 11.5–14.5)
SODIUM: 139 MMOL/L (ref 135–144)
WHITE BLOOD COUNT: 20.6 10^3/UL (ref 4.8–10.8)

## 2019-06-10 RX ADMIN — LISINOPRIL 1 MG: 20 TABLET ORAL at 08:47

## 2019-06-10 RX ADMIN — ATORVASTATIN CALCIUM 1 MG: 20 TABLET, FILM COATED ORAL at 20:15

## 2019-06-10 RX ADMIN — IPRATROPIUM BROMIDE AND ALBUTEROL SULFATE SCH ML: .5; 3 SOLUTION RESPIRATORY (INHALATION) at 13:58

## 2019-06-10 RX ADMIN — ZOLPIDEM TARTRATE PRN MG: 5 TABLET, FILM COATED ORAL at 00:08

## 2019-06-10 RX ADMIN — IPRATROPIUM BROMIDE AND ALBUTEROL SULFATE 1 ML: .5; 3 SOLUTION RESPIRATORY (INHALATION) at 21:54

## 2019-06-10 RX ADMIN — ENOXAPARIN SODIUM SCH MG: 100 INJECTION SUBCUTANEOUS at 08:58

## 2019-06-10 RX ADMIN — ALPRAZOLAM 1 MG: 1 TABLET ORAL at 15:37

## 2019-06-10 RX ADMIN — TAMSULOSIN HYDROCHLORIDE SCH MG: 0.4 CAPSULE ORAL at 08:47

## 2019-06-10 RX ADMIN — IPRATROPIUM BROMIDE AND ALBUTEROL SULFATE PRN ML: .5; 3 SOLUTION RESPIRATORY (INHALATION) at 02:57

## 2019-06-10 RX ADMIN — AZITHROMYCIN SCH MG: 250 TABLET, FILM COATED ORAL at 08:47

## 2019-06-10 RX ADMIN — AZITHROMYCIN 1 MG: 250 TABLET, FILM COATED ORAL at 08:47

## 2019-06-10 RX ADMIN — LISINOPRIL SCH MG: 20 TABLET ORAL at 08:47

## 2019-06-10 RX ADMIN — INSULIN ASPART 1 UNIT: 100 INJECTION, SOLUTION INTRAVENOUS; SUBCUTANEOUS at 20:15

## 2019-06-10 RX ADMIN — INSULIN GLARGINE 1 UNITS: 100 INJECTION, SOLUTION SUBCUTANEOUS at 20:14

## 2019-06-10 RX ADMIN — ALPRAZOLAM PRN MG: 1 TABLET ORAL at 15:37

## 2019-06-10 RX ADMIN — IPRATROPIUM BROMIDE AND ALBUTEROL SULFATE 1 ML: .5; 3 SOLUTION RESPIRATORY (INHALATION) at 13:58

## 2019-06-10 RX ADMIN — METHYLPREDNISOLONE SODIUM SUCCINATE 1 MG: 40 INJECTION, POWDER, FOR SOLUTION INTRAMUSCULAR; INTRAVENOUS at 15:19

## 2019-06-10 RX ADMIN — TAMSULOSIN HYDROCHLORIDE 1 MG: 0.4 CAPSULE ORAL at 08:47

## 2019-06-10 RX ADMIN — HYDROCODONE BITARTRATE AND ACETAMINOPHEN 1 TAB: 5; 325 TABLET ORAL at 17:59

## 2019-06-10 RX ADMIN — IPRATROPIUM BROMIDE AND ALBUTEROL SULFATE 1 ML: .5; 3 SOLUTION RESPIRATORY (INHALATION) at 07:40

## 2019-06-10 RX ADMIN — BUDESONIDE 1 MG: 0.5 SUSPENSION RESPIRATORY (INHALATION) at 21:45

## 2019-06-10 RX ADMIN — METOPROLOL SUCCINATE 1 MG: 50 TABLET, EXTENDED RELEASE ORAL at 12:12

## 2019-06-10 RX ADMIN — IPRATROPIUM BROMIDE AND ALBUTEROL SULFATE SCH ML: .5; 3 SOLUTION RESPIRATORY (INHALATION) at 07:40

## 2019-06-10 RX ADMIN — INSULIN ASPART 1 UNIT: 100 INJECTION, SOLUTION INTRAVENOUS; SUBCUTANEOUS at 17:37

## 2019-06-10 RX ADMIN — HYDROCODONE BITARTRATE AND ACETAMINOPHEN PRN TAB: 5; 325 TABLET ORAL at 17:59

## 2019-06-10 RX ADMIN — ATORVASTATIN CALCIUM SCH MG: 20 TABLET, FILM COATED ORAL at 20:15

## 2019-06-10 RX ADMIN — FUROSEMIDE 1 MG: 20 TABLET ORAL at 08:48

## 2019-06-10 RX ADMIN — INSULIN GLARGINE SCH UNITS: 100 INJECTION, SOLUTION SUBCUTANEOUS at 20:14

## 2019-06-10 RX ADMIN — METOPROLOL SUCCINATE SCH MG: 50 TABLET, EXTENDED RELEASE ORAL at 12:12

## 2019-06-10 RX ADMIN — INSULIN ASPART 1 UNIT: 100 INJECTION, SOLUTION INTRAVENOUS; SUBCUTANEOUS at 17:36

## 2019-06-10 RX ADMIN — METHYLPREDNISOLONE SODIUM SUCCINATE 1 MG: 40 INJECTION, POWDER, FOR SOLUTION INTRAMUSCULAR; INTRAVENOUS at 21:30

## 2019-06-10 RX ADMIN — BUDESONIDE SCH MG: 0.5 SUSPENSION RESPIRATORY (INHALATION) at 07:40

## 2019-06-10 RX ADMIN — IPRATROPIUM BROMIDE AND ALBUTEROL SULFATE 1 ML: .5; 3 SOLUTION RESPIRATORY (INHALATION) at 02:57

## 2019-06-10 RX ADMIN — IPRATROPIUM BROMIDE AND ALBUTEROL SULFATE SCH ML: .5; 3 SOLUTION RESPIRATORY (INHALATION) at 21:54

## 2019-06-10 RX ADMIN — BUDESONIDE SCH MG: 0.5 SUSPENSION RESPIRATORY (INHALATION) at 21:45

## 2019-06-10 RX ADMIN — INSULIN ASPART 1 UNIT: 100 INJECTION, SOLUTION INTRAVENOUS; SUBCUTANEOUS at 12:16

## 2019-06-10 RX ADMIN — ZOLPIDEM TARTRATE 1 MG: 5 TABLET, FILM COATED ORAL at 00:08

## 2019-06-10 RX ADMIN — INSULIN ASPART 1 UNIT: 100 INJECTION, SOLUTION INTRAVENOUS; SUBCUTANEOUS at 08:58

## 2019-06-10 RX ADMIN — BUDESONIDE 1 MG: 0.5 SUSPENSION RESPIRATORY (INHALATION) at 07:40

## 2019-06-10 RX ADMIN — VENLAFAXINE HYDROCHLORIDE 1 MG: 25 TABLET ORAL at 08:58

## 2019-06-10 RX ADMIN — VENLAFAXINE SCH MG: 25 TABLET ORAL at 08:58

## 2019-06-10 RX ADMIN — INSULIN ASPART 1 UNIT: 100 INJECTION, SOLUTION INTRAVENOUS; SUBCUTANEOUS at 12:15

## 2019-06-10 RX ADMIN — ENOXAPARIN SODIUM 1 MG: 100 INJECTION SUBCUTANEOUS at 08:58

## 2019-06-10 RX ADMIN — METHYLPREDNISOLONE SODIUM SUCCINATE 1 MG: 40 INJECTION, POWDER, FOR SOLUTION INTRAMUSCULAR; INTRAVENOUS at 06:37

## 2019-06-10 NOTE — PN
Date/Time of Note


Date/Time of Note


DATE: 6/10/19 


TIME: 14:08





Assessment/Plan


VTE Prophylaxis


Risk score (from Ns)>0 risk:  4


SCD applied (from Ns):  Yes


Pharmacological prophylaxis:  heparin





Lines/Catheters


IV Catheter Type (from Nrs):  Saline Lock


Urinary Cath still in place:  No





Assessment/Plan


Hospital Course


AOx3, pleasant


comfortable


Diffuse expiratory wheezing


Nonlabored


RRR





A/p:


69 yo male wilth DMII, COPD presenting with acute COPD exacerbation


- Continue steroids, bronchodilators, azithromycin





DMII


- Titrate basal/bolus insulin


Result Diagram:  


6/10/19 0755                                                                    


           6/10/19 0755





Results 24hrs





Laboratory Tests


Test
                  6/9/19
17:46   6/9/19
21:42  6/10/19
02:10  6/10/19
07:55


Bedside Glucose              296  H         321  H         278  H


White Blood Count                                                       20.6  #H


Red Blood Count                                                           4.96


Hemoglobin                                                                14.2


Hematocrit                                                               41.6  L


Mean Corpuscular                                                          83.9


Volume


Mean Corpuscular                                                         28.6  L


Hemoglobin


Mean Corpuscular      
              
              
                    34.1  



Hemoglobin
Concent


Red Cell                                                                  14.0


Distribution Width


Platelet Count                                                             261


Mean Platelet Volume                                                       9.8


Immature                                                                0.800  H


Granulocytes %


Neutrophils %                                                            88.0  H


Lymphocytes %                                                             5.5  L


Monocytes %                                                                5.5


Eosinophils %                                                              0.0


Basophils %                                                                0.2


Nucleated Red Blood                                                        0.0


Cells %


Immature                                                                0.170  H


Granulocytes #


Neutrophils #                                                            18.1  H


Lymphocytes #                                                              1.1


Monocytes #                                                               1.1  H


Eosinophils #                                                              0.0


Basophils #                                                                0.0


Nucleated Red Blood                                                        0.0


Cells #


Sodium Level                                                               139


Potassium Level                                                            3.6


Chloride Level                                                             103


Carbon Dioxide Level                                                        24


Anion Gap                                                                   12


Blood Urea Nitrogen                                                       37  #H


Creatinine                                                               1.41  H


Est Glomerular        
              
              
                     50  L



Filtrat Rate
mL/min


Glucose Level                                                             296  H


Calcium Level                                                              8.8


Phosphorus Level                                                           3.1


Magnesium Level                                                            1.7


Test
                 6/10/19
08:28  6/10/19
12:05  
              



Bedside Glucose              271  H         283  H








Subjective


24 Hr Interval Summary


Free Text/Dictation


Still quite wheezy, SOB





Exam/Review of Systems


Exam


Vitals





Vital Signs


  Date      Temp  Pulse  Resp  B/P (MAP)   Pulse Ox  O2          O2 Flow    FiO2


Time                                                 Delivery    Rate


   6/10/19          101    22                    95  Nasal             2.0


     13:58                                           Cannula


   6/10/19  97.8                   139/74


     11:11                           (95)


   6/10/19                                                                    31


     03:10








Intake and Output





6/9/19


6/9/19


6/10/19





1414:59


22:59


06:59





IntakeIntake Total


1200 ml





OutputOutput Total


1100 ml





BalanceBalance


100 ml














Results


Results 24hrs





Laboratory Tests


Test
                  6/9/19
17:46   6/9/19
21:42  6/10/19
02:10  6/10/19
07:55


Bedside Glucose              296  H         321  H         278  H


White Blood Count                                                       20.6  #H


Red Blood Count                                                           4.96


Hemoglobin                                                                14.2


Hematocrit                                                               41.6  L


Mean Corpuscular                                                          83.9


Volume


Mean Corpuscular                                                         28.6  L


Hemoglobin


Mean Corpuscular      
              
              
                    34.1  



Hemoglobin
Concent


Red Cell                                                                  14.0


Distribution Width


Platelet Count                                                             261


Mean Platelet Volume                                                       9.8


Immature                                                                0.800  H


Granulocytes %


Neutrophils %                                                            88.0  H


Lymphocytes %                                                             5.5  L


Monocytes %                                                                5.5


Eosinophils %                                                              0.0


Basophils %                                                                0.2


Nucleated Red Blood                                                        0.0


Cells %


Immature                                                                0.170  H


Granulocytes #


Neutrophils #                                                            18.1  H


Lymphocytes #                                                              1.1


Monocytes #                                                               1.1  H


Eosinophils #                                                              0.0


Basophils #                                                                0.0


Nucleated Red Blood                                                        0.0


Cells #


Sodium Level                                                               139


Potassium Level                                                            3.6


Chloride Level                                                             103


Carbon Dioxide Level                                                        24


Anion Gap                                                                   12


Blood Urea Nitrogen                                                       37  #H


Creatinine                                                               1.41  H


Est Glomerular        
              
              
                     50  L



Filtrat Rate
mL/min


Glucose Level                                                             296  H


Calcium Level                                                              8.8


Phosphorus Level                                                           3.1


Magnesium Level                                                            1.7


Test
                 6/10/19
08:28  6/10/19
12:05  
              



Bedside Glucose              271  H         283  H








Medications


Medication





Current Medications


IV Flush (NS 3 ml) 3 ml PER PROTOCOL IV ;  Start 6/8/19 at 20:00


Ondansetron HCl (Zofran Inj) 4 mg Q6H  PRN IV NAUSEA/VOMITING;  Start 6/8/19 at 


20:00


Acetaminophen (Tylenol Tab) 650 mg Q6H  PRN PO .PAIN 1-3 OR TEMP;  Start 6/8/19 


at 20:00


Acetaminophen/ Hydrocodone Bitart (Norco (5/325)) 1 tab Q6H  PRN PO .MOD PAIN 4-


6;  Start 6/8/19 at 20:00


Morphine Sulfate (morphine) 2 mg Q4H  PRN IV .SEVERE PAIN 7-10;  Start 6/8/19 at


20:00


Docusate Sodium (Colace) 100 mg Q12H  PRN PO .CONSTIPATION;  Start 6/8/19 at 


20:00


Zolpidem Tartrate (Ambien) 5 mg QHS  PRN PO .INSOMNIA Last administered on 


6/10/19at 00:08; Admin Dose 5 MG;  Start 6/8/19 at 20:00


Enoxaparin Sodium (Lovenox) 40 mg DAILY SC  Last administered on 6/10/19at 


08:58; Admin Dose 40 MG;  Start 6/9/19 at 09:00


Albuterol/ Ipratropium (Duoneb) 3 ml Q4H RESP THERAPY  PRN HHN SHORTNESS OF 


BREATH Last administered on 6/10/19at 02:57; Admin Dose 3 ML;  Start 6/8/19 at 


20:00


Atorvastatin Calcium (Lipitor) 20 mg QHS PO  Last administered on 6/9/19at 


21:39; Admin Dose 20 MG;  Start 6/8/19 at 21:00


Lisinopril (Zestril) 40 mg DAILY PO  Last administered on 6/10/19at 08:47; Admin


Dose 40 MG;  Start 6/9/19 at 09:00


Tamsulosin HCl (Flomax) 0.4 mg DAILY PO  Last administered on 6/10/19at 08:47; 


Admin Dose 0.4 MG;  Start 6/9/19 at 09:00


Venlafaxine HCl (Effexor) 50 mg DAILY PO ;  Start 6/9/19 at 09:00


Miscellaneous Information 1 ea NOTE XX ;  Start 6/8/19 at 20:30


Glucose (Glutose) 15 gm Q15M  PRN PO DECREASED GLUCOSE;  Start 6/8/19 at 20:30


Glucose (Glutose) 22.5 gm Q15M  PRN PO DECREASED GLUCOSE;  Start 6/8/19 at 20:30


Dextrose (D50w Syringe) 25 ml Q15M  PRN IV DECREASED GLUCOSE;  Start 6/8/19 at 


20:30


Dextrose (D50w Syringe) 50 ml Q15M  PRN IV DECREASED GLUCOSE;  Start 6/8/19 at 


20:30


Glucagon (Glucagen) 1 mg Q15M  PRN IM DECREASED GLUCOSE;  Start 6/8/19 at 20:30


Glucose (Glutose) 15 gm Q15M  PRN BUCCAL DECREASED GLUCOSE;  Start 6/8/19 at 


20:30


Methylprednisolone Sodium Succinate (Solu-Medrol) 40 mg Q8 IV  Last administered


on 6/10/19at 06:37; Admin Dose 40 MG;  Start 6/9/19 at 14:00


Budesonide (Pulmicort (Neb)) 0.5 mg BID RESP  THERAPY HHN  Last administered on 


6/10/19at 07:40; Admin Dose 0.5 MG;  Start 6/9/19 at 09:30


Albuterol/ Ipratropium (Duoneb) 3 ml Q6HWA RESP  THERAPY HHN  Last administered 


on 6/10/19at 13:58; Admin Dose 3 ML;  Start 6/9/19 at 14:00


Azithromycin (Zithromax) 250 mg NOW PO  Last administered on 6/10/19at 08:47; 


Admin Dose 250 MG;  Start 6/10/19 at 09:00;  Stop 6/13/19 at 09:01


Insulin Aspart (Novolog Insulin Pen) NOVOLOG *MODERATE* ALGORITHM WITH MEALS  


BEDTIME SC  Last administered on 6/10/19at 12:15; Admin Dose 8 UNIT;  Start 


6/9/19 at 21:00


Insulin Aspart (Novolog Insulin Pen) 15 unit WITH  MEALS SC  Last administered 


on 6/10/19at 12:16; Admin Dose 15 UNIT;  Start 6/10/19 at 11:50


Insulin Glargine (Lantus) 30 units DAILY@2000 SC ;  Start 6/10/19 at 20:00


Metoprolol Succinate (Toprol Xl) 50 mg DAILY PO  Last administered on 6/10/19at 


12:12; Admin Dose 50 MG;  Start 6/10/19 at 11:00











ML MIRELES MD          Domo 10, 2019 14:09

## 2019-06-11 VITALS — SYSTOLIC BLOOD PRESSURE: 127 MMHG | DIASTOLIC BLOOD PRESSURE: 66 MMHG | HEART RATE: 79 BPM | RESPIRATION RATE: 20 BRPM

## 2019-06-11 VITALS — RESPIRATION RATE: 20 BRPM | SYSTOLIC BLOOD PRESSURE: 127 MMHG | DIASTOLIC BLOOD PRESSURE: 64 MMHG | HEART RATE: 55 BPM

## 2019-06-11 VITALS — HEART RATE: 52 BPM | DIASTOLIC BLOOD PRESSURE: 73 MMHG | RESPIRATION RATE: 18 BRPM | SYSTOLIC BLOOD PRESSURE: 124 MMHG

## 2019-06-11 VITALS — HEART RATE: 72 BPM

## 2019-06-11 VITALS — HEART RATE: 51 BPM | DIASTOLIC BLOOD PRESSURE: 82 MMHG | RESPIRATION RATE: 19 BRPM | SYSTOLIC BLOOD PRESSURE: 120 MMHG

## 2019-06-11 VITALS — DIASTOLIC BLOOD PRESSURE: 68 MMHG | HEART RATE: 47 BPM | RESPIRATION RATE: 20 BRPM | SYSTOLIC BLOOD PRESSURE: 125 MMHG

## 2019-06-11 VITALS — HEART RATE: 82 BPM

## 2019-06-11 VITALS — HEART RATE: 87 BPM

## 2019-06-11 VITALS — HEART RATE: 78 BPM

## 2019-06-11 VITALS — HEART RATE: 83 BPM

## 2019-06-11 RX ADMIN — INSULIN ASPART 1 UNIT: 100 INJECTION, SOLUTION INTRAVENOUS; SUBCUTANEOUS at 08:39

## 2019-06-11 RX ADMIN — METHYLPREDNISOLONE SODIUM SUCCINATE 1 MG: 40 INJECTION, POWDER, FOR SOLUTION INTRAMUSCULAR; INTRAVENOUS at 05:41

## 2019-06-11 RX ADMIN — INSULIN ASPART 1 UNIT: 100 INJECTION, SOLUTION INTRAVENOUS; SUBCUTANEOUS at 12:06

## 2019-06-11 RX ADMIN — IPRATROPIUM BROMIDE AND ALBUTEROL SULFATE 1 ML: .5; 3 SOLUTION RESPIRATORY (INHALATION) at 19:11

## 2019-06-11 RX ADMIN — INSULIN ASPART 1 UNIT: 100 INJECTION, SOLUTION INTRAVENOUS; SUBCUTANEOUS at 17:55

## 2019-06-11 RX ADMIN — LISINOPRIL SCH MG: 20 TABLET ORAL at 08:10

## 2019-06-11 RX ADMIN — HYDROCODONE BITARTRATE AND ACETAMINOPHEN 1 TAB: 5; 325 TABLET ORAL at 22:03

## 2019-06-11 RX ADMIN — IPRATROPIUM BROMIDE AND ALBUTEROL SULFATE 1 ML: .5; 3 SOLUTION RESPIRATORY (INHALATION) at 08:04

## 2019-06-11 RX ADMIN — IPRATROPIUM BROMIDE AND ALBUTEROL SULFATE SCH ML: .5; 3 SOLUTION RESPIRATORY (INHALATION) at 08:04

## 2019-06-11 RX ADMIN — INSULIN GLARGINE SCH UNITS: 100 INJECTION, SOLUTION SUBCUTANEOUS at 20:33

## 2019-06-11 RX ADMIN — IPRATROPIUM BROMIDE AND ALBUTEROL SULFATE SCH ML: .5; 3 SOLUTION RESPIRATORY (INHALATION) at 13:24

## 2019-06-11 RX ADMIN — ENOXAPARIN SODIUM 1 MG: 100 INJECTION SUBCUTANEOUS at 08:42

## 2019-06-11 RX ADMIN — LINAGLIPTIN SCH MG: 5 TABLET, FILM COATED ORAL at 13:31

## 2019-06-11 RX ADMIN — METHYLPREDNISOLONE SODIUM SUCCINATE 1 MG: 125 INJECTION, POWDER, FOR SOLUTION INTRAMUSCULAR; INTRAVENOUS at 13:31

## 2019-06-11 RX ADMIN — LINAGLIPTIN 1 MG: 5 TABLET, FILM COATED ORAL at 13:31

## 2019-06-11 RX ADMIN — TAMSULOSIN HYDROCHLORIDE 1 MG: 0.4 CAPSULE ORAL at 08:09

## 2019-06-11 RX ADMIN — BUDESONIDE 1 MG: 0.5 SUSPENSION RESPIRATORY (INHALATION) at 08:04

## 2019-06-11 RX ADMIN — METOPROLOL SUCCINATE SCH MG: 50 TABLET, EXTENDED RELEASE ORAL at 08:09

## 2019-06-11 RX ADMIN — INSULIN ASPART 1 UNIT: 100 INJECTION, SOLUTION INTRAVENOUS; SUBCUTANEOUS at 20:34

## 2019-06-11 RX ADMIN — HYDROCODONE BITARTRATE AND ACETAMINOPHEN PRN TAB: 5; 325 TABLET ORAL at 22:03

## 2019-06-11 RX ADMIN — INSULIN GLARGINE 1 UNITS: 100 INJECTION, SOLUTION SUBCUTANEOUS at 20:33

## 2019-06-11 RX ADMIN — AZITHROMYCIN SCH MG: 250 TABLET, FILM COATED ORAL at 08:14

## 2019-06-11 RX ADMIN — ATORVASTATIN CALCIUM 1 MG: 20 TABLET, FILM COATED ORAL at 20:24

## 2019-06-11 RX ADMIN — VENLAFAXINE SCH MG: 25 TABLET ORAL at 09:00

## 2019-06-11 RX ADMIN — METHYLPREDNISOLONE SODIUM SUCCINATE 1 MG: 125 INJECTION, POWDER, FOR SOLUTION INTRAMUSCULAR; INTRAVENOUS at 23:04

## 2019-06-11 RX ADMIN — BUDESONIDE SCH MG: 0.5 SUSPENSION RESPIRATORY (INHALATION) at 08:04

## 2019-06-11 RX ADMIN — HYDROCODONE BITARTRATE AND ACETAMINOPHEN PRN TAB: 5; 325 TABLET ORAL at 08:09

## 2019-06-11 RX ADMIN — LISINOPRIL 1 MG: 20 TABLET ORAL at 08:10

## 2019-06-11 RX ADMIN — TAMSULOSIN HYDROCHLORIDE SCH MG: 0.4 CAPSULE ORAL at 08:09

## 2019-06-11 RX ADMIN — BUDESONIDE SCH MG: 0.5 SUSPENSION RESPIRATORY (INHALATION) at 19:11

## 2019-06-11 RX ADMIN — HYDROCODONE BITARTRATE AND ACETAMINOPHEN 1 TAB: 5; 325 TABLET ORAL at 08:09

## 2019-06-11 RX ADMIN — METHYLPREDNISOLONE SODIUM SUCCINATE 1 MG: 125 INJECTION, POWDER, FOR SOLUTION INTRAMUSCULAR; INTRAVENOUS at 17:32

## 2019-06-11 RX ADMIN — AZITHROMYCIN 1 MG: 250 TABLET, FILM COATED ORAL at 08:14

## 2019-06-11 RX ADMIN — ATORVASTATIN CALCIUM SCH MG: 20 TABLET, FILM COATED ORAL at 20:24

## 2019-06-11 RX ADMIN — IPRATROPIUM BROMIDE AND ALBUTEROL SULFATE SCH ML: .5; 3 SOLUTION RESPIRATORY (INHALATION) at 19:11

## 2019-06-11 RX ADMIN — INSULIN ASPART 1 UNIT: 100 INJECTION, SOLUTION INTRAVENOUS; SUBCUTANEOUS at 18:04

## 2019-06-11 RX ADMIN — BUDESONIDE 1 MG: 0.5 SUSPENSION RESPIRATORY (INHALATION) at 19:11

## 2019-06-11 RX ADMIN — METOPROLOL SUCCINATE 1 MG: 50 TABLET, EXTENDED RELEASE ORAL at 08:09

## 2019-06-11 RX ADMIN — INSULIN ASPART 1 UNIT: 100 INJECTION, SOLUTION INTRAVENOUS; SUBCUTANEOUS at 12:07

## 2019-06-11 RX ADMIN — IPRATROPIUM BROMIDE AND ALBUTEROL SULFATE 1 ML: .5; 3 SOLUTION RESPIRATORY (INHALATION) at 13:24

## 2019-06-11 RX ADMIN — INSULIN ASPART 1 UNIT: 100 INJECTION, SOLUTION INTRAVENOUS; SUBCUTANEOUS at 08:41

## 2019-06-11 RX ADMIN — ENOXAPARIN SODIUM SCH MG: 100 INJECTION SUBCUTANEOUS at 08:42

## 2019-06-11 RX ADMIN — VENLAFAXINE HYDROCHLORIDE 1 MG: 25 TABLET ORAL at 09:00

## 2019-06-11 NOTE — CONS
Assessment/Plan


Assessment/Plan


Assessment/Plan (Daily)


Assessment and recommendations; next





1.  Patient admitted with severe asthma exacerbation with acute bronchitis with 


suboptimally controlled asthma on an outpatient basis.  Patient having 


persistent symptoms despite adequate bronchodilator regimen.


2.  History of diabetes and hypertension.


3.  Possible underlying sleep apnea.





Increase Solu-Medrol to 60 mg every 6 hours from 40 mg every 8 hours.  Continue 


other supportive measures.





Consultation Date/Type/Reason


Admit Date/Time


Jun 8, 2019 at 17:27


Initial Consult Date


6/9/19


Type of Consult


Pulmonary





Patient is a pleasant 68-year-old gentleman who came into the hospital with a 


few days history of increased wheezing, coughing, production of white-yellow 


sputum.  Upon evaluation patient has been diagnosed with asthma exacerbation and


treated with appropriate modality regimen.  Patient is reporting some 


improvement since admission.  Patient does complain of chronic symptoms of 


asthma which apparently is poorly controlled on an outpatient basis.  He does 


complain of frequent flareups as well.  Patient however denies any high fever, 


chills, body aches or myalgias and also denies any sinus symptoms.





Past medical history; 





1.  Asthma


2.  Diabetes and hypertension.





Medications; reviewed.


Allergies; sulfa drugs.


Social history; noncontributory.


Family history; he is single, he does not have any children.  No show any asthma


in the family.


Occupational history; patient is on disability.


Review of systems; denies any headache, seizures, sinus symptoms.  Any 


dysphagia.  Any chest pain or angina.  Complains of cough with wheezing as well 


as mild dyspnea on exertion.  Denies any abdominal pain, nausea vomiting.  Any 


melena or hematochezia.  Any urinary symptoms.  Denies any weight gain.  Denies 


any orthopnea.  Complains of occasional snoring and daytime sleepiness.


General exam; elderly male, looks younger than age.  Currently no distress.  


Laying comfortably in bed.


Date/Time of Note


DATE: 6/11/19 


TIME: 11:53





24 HR Interval Summary


Free Text/Dictation


Patient still complaining of wheezing shortness of breath and cough.  Has not 


shown any improvement in symptoms.


General exam; elderly male, appears overweight, awake and alert.  Currently no 


distress.





Exam/Review of Systems


Exam


Vitals





Vital Signs


  Date      Temp  Pulse  Resp  B/P (MAP)   Pulse Ox  O2          O2 Flow    FiO2


Time                                                 Delivery    Rate


   6/11/19  98.2     52    18      124/73        96  Nasal


     11:13                           (90)            Cannula


   6/11/19                                                             3.0


     08:05


   6/11/19                                                                    28


     01:03








Intake and Output





6/10/19


6/10/19


6/11/19





1515:00


23:00


07:00





IntakeIntake Total


1600 ml





BalanceBalance


1600 ml











Exam


HEENT exam; supple neck, no JVD.  No lymphadenopathy.  Midline trachea.  No 


thyromegaly.  Patient has fair dentition.  No neck masses.


Chest exam; bilateral wheezing.  S1-S2 audible, no murmurs.  Regular rhythm.


Abdomen exam; soft, protuberant.  Nontender.  No organomegaly.  Bowel sounds are


audible.  There is a small plical hernia.


Extremity exam; no peripheral edema clubbing.


CNS exam; no focal deficit.





Results


Result Diagram:  


6/10/19 0755                                                                    


           6/10/19 0755





Results 24hrs





Laboratory Tests


  Test
            6/10/19
12:05  6/10/19
17:30  6/10/19
20:09  6/11/19
07:53


  Bedside Glucose         283  H          149            171            171








Medications


Medication





Current Medications


IV Flush (NS 3 ml) 3 ml PER PROTOCOL IV ;  Start 6/8/19 at 20:00


Ondansetron HCl (Zofran Inj) 4 mg Q6H  PRN IV NAUSEA/VOMITING;  Start 6/8/19 at 


20:00


Acetaminophen (Tylenol Tab) 650 mg Q6H  PRN PO .PAIN 1-3 OR TEMP;  Start 6/8/19 


at 20:00


Acetaminophen/ Hydrocodone Bitart (Norco (5/325)) 1 tab Q6H  PRN PO .MOD PAIN 4-


6 Last administered on 6/11/19at 08:09; Admin Dose 1 TAB;  Start 6/8/19 at 20:00


Morphine Sulfate (morphine) 2 mg Q4H  PRN IV .SEVERE PAIN 7-10;  Start 6/8/19 at


20:00


Docusate Sodium (Colace) 100 mg Q12H  PRN PO .CONSTIPATION;  Start 6/8/19 at 


20:00


Zolpidem Tartrate (Ambien) 5 mg QHS  PRN PO .INSOMNIA Last administered on 


6/10/19at 00:08; Admin Dose 5 MG;  Start 6/8/19 at 20:00


Enoxaparin Sodium (Lovenox) 40 mg DAILY SC  Last administered on 6/11/19at 


08:42; Admin Dose 40 MG;  Start 6/9/19 at 09:00


Albuterol/ Ipratropium (Duoneb) 3 ml Q4H RESP THERAPY  PRN HHN SHORTNESS OF B


REATH Last administered on 6/10/19at 02:57; Admin Dose 3 ML;  Start 6/8/19 at 


20:00


Atorvastatin Calcium (Lipitor) 20 mg QHS PO  Last administered on 6/10/19at 


20:15; Admin Dose 20 MG;  Start 6/8/19 at 21:00


Lisinopril (Zestril) 40 mg DAILY PO  Last administered on 6/11/19at 08:10; Admin


Dose 40 MG;  Start 6/9/19 at 09:00


Tamsulosin HCl (Flomax) 0.4 mg DAILY PO  Last administered on 6/11/19at 08:09; 


Admin Dose 0.4 MG;  Start 6/9/19 at 09:00


Venlafaxine HCl (Effexor) 50 mg DAILY PO ;  Start 6/9/19 at 09:00


Miscellaneous Information 1 ea NOTE XX ;  Start 6/8/19 at 20:30


Glucose (Glutose) 15 gm Q15M  PRN PO DECREASED GLUCOSE;  Start 6/8/19 at 20:30


Glucose (Glutose) 22.5 gm Q15M  PRN PO DECREASED GLUCOSE;  Start 6/8/19 at 20:30


Dextrose (D50w Syringe) 25 ml Q15M  PRN IV DECREASED GLUCOSE;  Start 6/8/19 at 


20:30


Dextrose (D50w Syringe) 50 ml Q15M  PRN IV DECREASED GLUCOSE;  Start 6/8/19 at 2


0:30


Glucagon (Glucagen) 1 mg Q15M  PRN IM DECREASED GLUCOSE;  Start 6/8/19 at 20:30


Glucose (Glutose) 15 gm Q15M  PRN BUCCAL DECREASED GLUCOSE;  Start 6/8/19 at 


20:30


Methylprednisolone Sodium Succinate (Solu-Medrol) 40 mg Q8 IV  Last administered


on 6/11/19at 05:41; Admin Dose 40 MG;  Start 6/9/19 at 14:00


Budesonide (Pulmicort (Neb)) 0.5 mg BID RESP  THERAPY HHN  Last administered on 


6/11/19at 08:04; Admin Dose 0.5 MG;  Start 6/9/19 at 09:30


Albuterol/ Ipratropium (Duoneb) 3 ml Q6HWA RESP  THERAPY HHN  Last administered 


on 6/11/19at 08:04; Admin Dose 3 ML;  Start 6/9/19 at 14:00


Azithromycin (Zithromax) 250 mg NOW PO  Last administered on 6/11/19at 08:14; 


Admin Dose 250 MG;  Start 6/10/19 at 09:00;  Stop 6/13/19 at 09:01


Insulin Aspart (Novolog Insulin Pen) NOVOLOG *MODERATE* ALGORITHM WITH MEALS  


BEDTIME SC  Last administered on 6/11/19at 08:41; Admin Dose 2 UNIT;  Start 


6/9/19 at 21:00


Insulin Aspart (Novolog Insulin Pen) 15 unit WITH  MEALS SC  Last administered 


on 6/11/19at 08:39; Admin Dose 15 UNIT;  Start 6/10/19 at 11:50


Insulin Glargine (Lantus) 30 units DAILY@2000 SC  Last administered on 6/10/19at


20:14; Admin Dose 30 UNITS;  Start 6/10/19 at 20:00


Metoprolol Succinate (Toprol Xl) 50 mg DAILY PO  Last administered on 6/11/19at 


08:09; Admin Dose 50 MG;  Start 6/10/19 at 11:00


Alprazolam (Xanax) 1 mg Q8H  PRN PO ANXIETY Last administered on 6/10/19at 


15:37; Admin Dose 1 MG;  Start 6/10/19 at 15:30











PARKER FIORE                    Jun 11, 2019 11:54

## 2019-06-11 NOTE — PN
Date/Time of Note


Date/Time of Note


DATE: 6/11/19 


TIME: 12:40





Assessment/Plan


VTE Prophylaxis


Risk score (from Nsg)>0 risk:  4


SCD applied (from Nsg):  Yes


Pharmacological prophylaxis:  heparin





Lines/Catheters


IV Catheter Type (from Nrsg):  Peripheral IV


Urinary Cath still in place:  No





Assessment/Plan


Hospital Course


AOx3, pleasant


comfortable


Diffuse expiratory wheezing


Nonlabored


RRR





A/p:


69 yo male wilth DMII, COPD presenting with acute COPD exacerbation


- Continue steroids, bronchodilators, azithromycin





DMII


- Titrate basal/bolus insulin


Result Diagram:  


6/10/19 0755                                                                    


           6/10/19 0755





Results 24hrs





Laboratory Tests


  Test
            6/10/19
17:30  6/10/19
20:09  6/11/19
07:53  6/11/19
11:51


  Bedside Glucose          149            171            171           279  H








Subjective


24 Hr Interval Summary


Free Text/Dictation


lots of couhg, still wheezing, SOB





Exam/Review of Systems


Exam


Vitals





Vital Signs


  Date      Temp  Pulse  Resp  B/P (MAP)   Pulse Ox  O2          O2 Flow    FiO2


Time                                                 Delivery    Rate


   6/11/19  98.2     52    18      124/73        96  Nasal


     11:13                           (90)            Cannula


   6/11/19                                                             3.0


     08:05


   6/11/19                                                                    28


     01:03








Intake and Output





6/10/19


6/10/19


6/11/19





1515:00


23:00


07:00





IntakeIntake Total


1600 ml





BalanceBalance


1600 ml














Results


Results 24hrs





Laboratory Tests


  Test
            6/10/19
17:30  6/10/19
20:09  6/11/19
07:53  6/11/19
11:51


  Bedside Glucose          149            171            171           279  H








Medications


Medication





Current Medications


IV Flush (NS 3 ml) 3 ml PER PROTOCOL IV ;  Start 6/8/19 at 20:00


Ondansetron HCl (Zofran Inj) 4 mg Q6H  PRN IV NAUSEA/VOMITING;  Start 6/8/19 at 


20:00


Acetaminophen (Tylenol Tab) 650 mg Q6H  PRN PO .PAIN 1-3 OR TEMP;  Start 6/8/19 


at 20:00


Acetaminophen/ Hydrocodone Bitart (Norco (5/325)) 1 tab Q6H  PRN PO .MOD PAIN 4-


6 Last administered on 6/11/19at 08:09; Admin Dose 1 TAB;  Start 6/8/19 at 20:00


Morphine Sulfate (morphine) 2 mg Q4H  PRN IV .SEVERE PAIN 7-10;  Start 6/8/19 at


20:00


Docusate Sodium (Colace) 100 mg Q12H  PRN PO .CONSTIPATION;  Start 6/8/19 at 


20:00


Zolpidem Tartrate (Ambien) 5 mg QHS  PRN PO .INSOMNIA Last administered on 


6/10/19at 00:08; Admin Dose 5 MG;  Start 6/8/19 at 20:00


Enoxaparin Sodium (Lovenox) 40 mg DAILY SC  Last administered on 6/11/19at 


08:42; Admin Dose 40 MG;  Start 6/9/19 at 09:00


Albuterol/ Ipratropium (Duoneb) 3 ml Q4H RESP THERAPY  PRN HHN SHORTNESS OF 


BREATH Last administered on 6/10/19at 02:57; Admin Dose 3 ML;  Start 6/8/19 at 


20:00


Atorvastatin Calcium (Lipitor) 20 mg QHS PO  Last administered on 6/10/19at 


20:15; Admin Dose 20 MG;  Start 6/8/19 at 21:00


Lisinopril (Zestril) 40 mg DAILY PO  Last administered on 6/11/19at 08:10; Admin


Dose 40 MG;  Start 6/9/19 at 09:00


Tamsulosin HCl (Flomax) 0.4 mg DAILY PO  Last administered on 6/11/19at 08:09; 


Admin Dose 0.4 MG;  Start 6/9/19 at 09:00


Venlafaxine HCl (Effexor) 50 mg DAILY PO ;  Start 6/9/19 at 09:00


Miscellaneous Information 1 ea NOTE XX ;  Start 6/8/19 at 20:30


Glucose (Glutose) 15 gm Q15M  PRN PO DECREASED GLUCOSE;  Start 6/8/19 at 20:30


Glucose (Glutose) 22.5 gm Q15M  PRN PO DECREASED GLUCOSE;  Start 6/8/19 at 20:30


Dextrose (D50w Syringe) 25 ml Q15M  PRN IV DECREASED GLUCOSE;  Start 6/8/19 at 


20:30


Dextrose (D50w Syringe) 50 ml Q15M  PRN IV DECREASED GLUCOSE;  Start 6/8/19 at 


20:30


Glucagon (Glucagen) 1 mg Q15M  PRN IM DECREASED GLUCOSE;  Start 6/8/19 at 20:30


Glucose (Glutose) 15 gm Q15M  PRN BUCCAL DECREASED GLUCOSE;  Start 6/8/19 at 


20:30


Budesonide (Pulmicort (Neb)) 0.5 mg BID RESP  THERAPY HHN  Last administered on 


6/11/19at 08:04; Admin Dose 0.5 MG;  Start 6/9/19 at 09:30


Albuterol/ Ipratropium (Duoneb) 3 ml Q6HWA RESP  THERAPY HHN  Last administered 


on 6/11/19at 08:04; Admin Dose 3 ML;  Start 6/9/19 at 14:00


Azithromycin (Zithromax) 250 mg NOW PO  Last administered on 6/11/19at 08:14; 


Admin Dose 250 MG;  Start 6/10/19 at 09:00;  Stop 6/13/19 at 09:01


Insulin Aspart (Novolog Insulin Pen) NOVOLOG *MODERATE* ALGORITHM WITH MEALS  


BEDTIME SC  Last administered on 6/11/19at 12:06; Admin Dose 8 UNIT;  Start 


6/9/19 at 21:00


Insulin Aspart (Novolog Insulin Pen) 15 unit WITH  MEALS SC  Last administered 


on 6/11/19at 12:07; Admin Dose 15 UNIT;  Start 6/10/19 at 11:50


Insulin Glargine (Lantus) 30 units DAILY@2000 SC  Last administered on 6/10/19at


20:14; Admin Dose 30 UNITS;  Start 6/10/19 at 20:00


Metoprolol Succinate (Toprol Xl) 50 mg DAILY PO  Last administered on 6/11/19at 


08:09; Admin Dose 50 MG;  Start 6/10/19 at 11:00


Alprazolam (Xanax) 1 mg Q8H  PRN PO ANXIETY Last administered on 6/10/19at 


15:37; Admin Dose 1 MG;  Start 6/10/19 at 15:30


Methylprednisolone Sodium Succinate (Solu-Medrol) 60 mg Q6 IV ;  Start 6/11/19 


at 12:00


Linagliptin (Tradjenta) 5 mg DAILY PO ;  Start 6/11/19 at 13:00











ML MIRELES MD          Jun 11, 2019 12:40

## 2019-06-12 VITALS — RESPIRATION RATE: 20 BRPM | DIASTOLIC BLOOD PRESSURE: 95 MMHG | HEART RATE: 94 BPM | SYSTOLIC BLOOD PRESSURE: 138 MMHG

## 2019-06-12 VITALS — HEART RATE: 88 BPM

## 2019-06-12 VITALS — HEART RATE: 58 BPM | DIASTOLIC BLOOD PRESSURE: 71 MMHG | SYSTOLIC BLOOD PRESSURE: 107 MMHG | RESPIRATION RATE: 22 BRPM

## 2019-06-12 VITALS — HEART RATE: 82 BPM

## 2019-06-12 VITALS — SYSTOLIC BLOOD PRESSURE: 130 MMHG | HEART RATE: 50 BPM | DIASTOLIC BLOOD PRESSURE: 86 MMHG | RESPIRATION RATE: 20 BRPM

## 2019-06-12 VITALS — DIASTOLIC BLOOD PRESSURE: 76 MMHG | HEART RATE: 53 BPM | RESPIRATION RATE: 20 BRPM | SYSTOLIC BLOOD PRESSURE: 143 MMHG

## 2019-06-12 VITALS — DIASTOLIC BLOOD PRESSURE: 92 MMHG | HEART RATE: 54 BPM | RESPIRATION RATE: 20 BRPM | SYSTOLIC BLOOD PRESSURE: 125 MMHG

## 2019-06-12 VITALS — RESPIRATION RATE: 21 BRPM | HEART RATE: 50 BPM | SYSTOLIC BLOOD PRESSURE: 135 MMHG | DIASTOLIC BLOOD PRESSURE: 82 MMHG

## 2019-06-12 VITALS — HEART RATE: 76 BPM

## 2019-06-12 VITALS — HEART RATE: 135 BPM

## 2019-06-12 VITALS — HEART RATE: 95 BPM

## 2019-06-12 VITALS — HEART RATE: 83 BPM

## 2019-06-12 VITALS — HEART RATE: 97 BPM

## 2019-06-12 VITALS — HEART RATE: 91 BPM

## 2019-06-12 RX ADMIN — IPRATROPIUM BROMIDE AND ALBUTEROL SULFATE 1 ML: .5; 3 SOLUTION RESPIRATORY (INHALATION) at 19:34

## 2019-06-12 RX ADMIN — ALPRAZOLAM 1 MG: 1 TABLET ORAL at 01:34

## 2019-06-12 RX ADMIN — AZITHROMYCIN 1 MG: 250 TABLET, FILM COATED ORAL at 08:53

## 2019-06-12 RX ADMIN — ZOLPIDEM TARTRATE 1 MG: 5 TABLET, FILM COATED ORAL at 23:41

## 2019-06-12 RX ADMIN — ATORVASTATIN CALCIUM 1 MG: 20 TABLET, FILM COATED ORAL at 20:06

## 2019-06-12 RX ADMIN — FUROSEMIDE 1 MG: 10 INJECTION, SOLUTION INTRAVENOUS at 06:02

## 2019-06-12 RX ADMIN — INSULIN ASPART 1 UNIT: 100 INJECTION, SOLUTION INTRAVENOUS; SUBCUTANEOUS at 08:37

## 2019-06-12 RX ADMIN — LISINOPRIL SCH MG: 20 TABLET ORAL at 08:48

## 2019-06-12 RX ADMIN — INSULIN ASPART 1 UNIT: 100 INJECTION, SOLUTION INTRAVENOUS; SUBCUTANEOUS at 12:39

## 2019-06-12 RX ADMIN — METOPROLOL SUCCINATE SCH MG: 50 TABLET, EXTENDED RELEASE ORAL at 08:48

## 2019-06-12 RX ADMIN — IPRATROPIUM BROMIDE AND ALBUTEROL SULFATE PRN ML: .5; 3 SOLUTION RESPIRATORY (INHALATION) at 01:39

## 2019-06-12 RX ADMIN — TAMSULOSIN HYDROCHLORIDE SCH MG: 0.4 CAPSULE ORAL at 08:47

## 2019-06-12 RX ADMIN — AZITHROMYCIN SCH MG: 250 TABLET, FILM COATED ORAL at 08:53

## 2019-06-12 RX ADMIN — INSULIN ASPART 1 UNIT: 100 INJECTION, SOLUTION INTRAVENOUS; SUBCUTANEOUS at 18:09

## 2019-06-12 RX ADMIN — BUDESONIDE SCH MG: 0.5 SUSPENSION RESPIRATORY (INHALATION) at 19:34

## 2019-06-12 RX ADMIN — INSULIN GLARGINE 1 UNITS: 100 INJECTION, SOLUTION SUBCUTANEOUS at 20:10

## 2019-06-12 RX ADMIN — METHYLPREDNISOLONE SODIUM SUCCINATE 1 MG: 125 INJECTION, POWDER, FOR SOLUTION INTRAMUSCULAR; INTRAVENOUS at 23:12

## 2019-06-12 RX ADMIN — MORPHINE SULFATE PRN MG: 2 INJECTION, SOLUTION INTRAMUSCULAR; INTRAVENOUS at 00:08

## 2019-06-12 RX ADMIN — ATORVASTATIN CALCIUM SCH MG: 20 TABLET, FILM COATED ORAL at 20:06

## 2019-06-12 RX ADMIN — LINAGLIPTIN 1 MG: 5 TABLET, FILM COATED ORAL at 08:47

## 2019-06-12 RX ADMIN — INSULIN ASPART 1 UNIT: 100 INJECTION, SOLUTION INTRAVENOUS; SUBCUTANEOUS at 20:06

## 2019-06-12 RX ADMIN — ENOXAPARIN SODIUM SCH MG: 100 INJECTION SUBCUTANEOUS at 08:51

## 2019-06-12 RX ADMIN — INSULIN GLARGINE SCH UNITS: 100 INJECTION, SOLUTION SUBCUTANEOUS at 20:10

## 2019-06-12 RX ADMIN — LINAGLIPTIN SCH MG: 5 TABLET, FILM COATED ORAL at 08:47

## 2019-06-12 RX ADMIN — IPRATROPIUM BROMIDE AND ALBUTEROL SULFATE SCH ML: .5; 3 SOLUTION RESPIRATORY (INHALATION) at 08:06

## 2019-06-12 RX ADMIN — BUDESONIDE SCH MG: 0.5 SUSPENSION RESPIRATORY (INHALATION) at 08:06

## 2019-06-12 RX ADMIN — TAMSULOSIN HYDROCHLORIDE 1 MG: 0.4 CAPSULE ORAL at 08:47

## 2019-06-12 RX ADMIN — IPRATROPIUM BROMIDE AND ALBUTEROL SULFATE SCH ML: .5; 3 SOLUTION RESPIRATORY (INHALATION) at 19:34

## 2019-06-12 RX ADMIN — METHYLPREDNISOLONE SODIUM SUCCINATE 1 MG: 125 INJECTION, POWDER, FOR SOLUTION INTRAMUSCULAR; INTRAVENOUS at 17:45

## 2019-06-12 RX ADMIN — HYDROCODONE BITARTRATE AND ACETAMINOPHEN PRN TAB: 5; 325 TABLET ORAL at 20:06

## 2019-06-12 RX ADMIN — BUDESONIDE 1 MG: 0.5 SUSPENSION RESPIRATORY (INHALATION) at 19:34

## 2019-06-12 RX ADMIN — METHYLPREDNISOLONE SODIUM SUCCINATE 1 MG: 125 INJECTION, POWDER, FOR SOLUTION INTRAMUSCULAR; INTRAVENOUS at 06:02

## 2019-06-12 RX ADMIN — VENLAFAXINE SCH MG: 25 TABLET ORAL at 08:54

## 2019-06-12 RX ADMIN — INSULIN ASPART 1 UNIT: 100 INJECTION, SOLUTION INTRAVENOUS; SUBCUTANEOUS at 18:10

## 2019-06-12 RX ADMIN — VENLAFAXINE HYDROCHLORIDE 1 MG: 25 TABLET ORAL at 08:54

## 2019-06-12 RX ADMIN — IPRATROPIUM BROMIDE AND ALBUTEROL SULFATE 1 ML: .5; 3 SOLUTION RESPIRATORY (INHALATION) at 08:06

## 2019-06-12 RX ADMIN — METHYLPREDNISOLONE SODIUM SUCCINATE 1 MG: 125 INJECTION, POWDER, FOR SOLUTION INTRAMUSCULAR; INTRAVENOUS at 12:30

## 2019-06-12 RX ADMIN — INSULIN ASPART 1 UNIT: 100 INJECTION, SOLUTION INTRAVENOUS; SUBCUTANEOUS at 12:40

## 2019-06-12 RX ADMIN — ENOXAPARIN SODIUM 1 MG: 100 INJECTION SUBCUTANEOUS at 08:51

## 2019-06-12 RX ADMIN — ALPRAZOLAM PRN MG: 1 TABLET ORAL at 01:34

## 2019-06-12 RX ADMIN — IPRATROPIUM BROMIDE AND ALBUTEROL SULFATE SCH ML: .5; 3 SOLUTION RESPIRATORY (INHALATION) at 13:11

## 2019-06-12 RX ADMIN — IPRATROPIUM BROMIDE AND ALBUTEROL SULFATE 1 ML: .5; 3 SOLUTION RESPIRATORY (INHALATION) at 01:39

## 2019-06-12 RX ADMIN — BUDESONIDE 1 MG: 0.5 SUSPENSION RESPIRATORY (INHALATION) at 08:06

## 2019-06-12 RX ADMIN — HYDROCODONE BITARTRATE AND ACETAMINOPHEN 1 TAB: 5; 325 TABLET ORAL at 20:06

## 2019-06-12 RX ADMIN — METOPROLOL SUCCINATE 1 MG: 50 TABLET, EXTENDED RELEASE ORAL at 08:48

## 2019-06-12 RX ADMIN — ZOLPIDEM TARTRATE PRN MG: 5 TABLET, FILM COATED ORAL at 23:41

## 2019-06-12 RX ADMIN — IPRATROPIUM BROMIDE AND ALBUTEROL SULFATE 1 ML: .5; 3 SOLUTION RESPIRATORY (INHALATION) at 13:11

## 2019-06-12 RX ADMIN — LISINOPRIL 1 MG: 20 TABLET ORAL at 08:48

## 2019-06-12 RX ADMIN — MORPHINE SULFATE 1 MG: 2 INJECTION, SOLUTION INTRAMUSCULAR; INTRAVENOUS at 00:08

## 2019-06-12 NOTE — CONS
Assessment/Plan


Assessment/Plan


Assessment/Plan (Daily)


Assessment and recommendations;





1.  Patient admitted with severe asthma exacerbation and acute bronchitis with 


very little interval improvement despite very aggressive treatment regimen.


2.  History of hypertension and diabetes.


3.  Chronic pain syndrome.


4.  BPH.





Continue current supportive care.





Consultation Date/Type/Reason


Admit Date/Time


Jun 8, 2019 at 17:27


Initial Consult Date


6/9/19


Type of Consult


Pulmonary





Patient is a pleasant 68-year-old gentleman who came into the hospital with a 


few days history of increased wheezing, coughing, production of white-yellow 


sputum.  Upon evaluation patient has been diagnosed with asthma exacerbation and


treated with appropriate modality regimen.  Patient is reporting some 


improvement since admission.  Patient does complain of chronic symptoms of 


asthma which apparently is poorly controlled on an outpatient basis.  He does 


complain of frequent flareups as well.  Patient however denies any high fever, 


chills, body aches or myalgias and also denies any sinus symptoms.





Past medical history; 





1.  Asthma


2.  Diabetes and hypertension.





Medications; reviewed.


Allergies; sulfa drugs.


Social history; noncontributory.


Family history; he is single, he does not have any children.  No show any asthma


in the family.


Occupational history; patient is on disability.


Review of systems; denies any headache, seizures, sinus symptoms.  Any 


dysphagia.  Any chest pain or angina.  Complains of cough with wheezing as well 


as mild dyspnea on exertion.  Denies any abdominal pain, nausea vomiting.  Any 


melena or hematochezia.  Any urinary symptoms.  Denies any weight gain.  Denies 


any orthopnea.  Complains of occasional snoring and daytime sleepiness.


General exam; elderly male, looks younger than age.  Currently no distress.  


Laying comfortably in bed.


Date/Time of Note


DATE: 6/12/19 


TIME: 11:07





24 HR Interval Summary


Free Text/Dictation


Patient's condition is essentially unchanged.  She complains of wheezing and 


shortness of breath.


General exam; elderly male, awake alert, currently no distress.





Exam/Review of Systems


Exam


Vitals





Vital Signs


  Date      Temp  Pulse  Resp  B/P (MAP)   Pulse Ox  O2          O2 Flow    FiO2


Time                                                 Delivery    Rate


   6/12/19           83


     08:13


   6/12/19                 25                    93  Nasal             3.0


     08:07                                           Cannula


   6/12/19  98.6                   107/71


     07:15                           (83)


   6/12/19                                                                    30


     03:56








Intake and Output





6/11/19 6/11/19 6/12/19





1515:00


23:00


07:00





IntakeIntake Total


1200 ml


600 ml





BalanceBalance


1200 ml


600 ml











Exam


H EENT exam; supple neck, no JVD.  No lymphadenopathy.  Midline trachea.  No 


thyromegaly.  Patient has fair dentition.  No neck masses.


Chest exam; bilateral wheezing with diminished breath sounds throughout.  S1-S2 


audible, no murmurs.  Regular rhythm.


Abdomen exam; soft, protuberant.  Nontender.  Bowel sounds audible.


Extremity exam; no peripheral edema.


CNS exam; no focal deficit.





Results


Result Diagram:  


6/10/19 0755                                                                    


           6/10/19 0755





Results 24hrs





Laboratory Tests


  Test
            6/11/19
11:51  6/11/19
17:30  6/11/19
20:23  6/12/19
08:33


  Bedside Glucose         279  H          116            152           279  H








Medications


Medication





Current Medications


IV Flush (NS 3 ml) 3 ml PER PROTOCOL IV ;  Start 6/8/19 at 20:00


Ondansetron HCl (Zofran Inj) 4 mg Q6H  PRN IV NAUSEA/VOMITING;  Start 6/8/19 at 


20:00


Acetaminophen (Tylenol Tab) 650 mg Q6H  PRN PO .PAIN 1-3 OR TEMP;  Start 6/8/19 


at 20:00


Acetaminophen/ Hydrocodone Bitart (Norco (5/325)) 1 tab Q6H  PRN PO .MOD PAIN 4-


6 Last administered on 6/11/19at 22:03; Admin Dose 1 TAB;  Start 6/8/19 at 20:00


Morphine Sulfate (morphine) 2 mg Q4H  PRN IV .SEVERE PAIN 7-10 Last administered


on 6/12/19at 00:08; Admin Dose 2 MG;  Start 6/8/19 at 20:00


Docusate Sodium (Colace) 100 mg Q12H  PRN PO .CONSTIPATION;  Start 6/8/19 at 


20:00


Zolpidem Tartrate (Ambien) 5 mg QHS  PRN PO .INSOMNIA Last administered on 


6/10/19at 00:08; Admin Dose 5 MG;  Start 6/8/19 at 20:00


Enoxaparin Sodium (Lovenox) 40 mg DAILY SC  Last administered on 6/12/19at 


08:51; Admin Dose 40 MG;  Start 6/9/19 at 09:00


Albuterol/ Ipratropium (Duoneb) 3 ml Q4H RESP THERAPY  PRN HHN SHORTNESS OF 


BREATH Last administered on 6/12/19at 01:39; Admin Dose 3 ML;  Start 6/8/19 at 


20:00


Atorvastatin Calcium (Lipitor) 20 mg QHS PO  Last administered on 6/11/19at 


20:24; Admin Dose 20 MG;  Start 6/8/19 at 21:00


Lisinopril (Zestril) 40 mg DAILY PO  Last administered on 6/12/19at 08:48; Admin


Dose 40 MG;  Start 6/9/19 at 09:00


Tamsulosin HCl (Flomax) 0.4 mg DAILY PO  Last administered on 6/12/19at 08:47; 


Admin Dose 0.4 MG;  Start 6/9/19 at 09:00


Venlafaxine HCl (Effexor) 50 mg DAILY PO  Last administered on 6/12/19at 08:54; 


Admin Dose 50 MG;  Start 6/9/19 at 09:00


Miscellaneous Information 1 ea NOTE XX ;  Start 6/8/19 at 20:30


Glucose (Glutose) 15 gm Q15M  PRN PO DECREASED GLUCOSE;  Start 6/8/19 at 20:30


Glucose (Glutose) 22.5 gm Q15M  PRN PO DECREASED GLUCOSE;  Start 6/8/19 at 20:30


Dextrose (D50w Syringe) 25 ml Q15M  PRN IV DECREASED GLUCOSE;  Start 6/8/19 at 


20:30


Dextrose (D50w Syringe) 50 ml Q15M  PRN IV DECREASED GLUCOSE;  Start 6/8/19 at 


20:30


Glucagon (Glucagen) 1 mg Q15M  PRN IM DECREASED GLUCOSE;  Start 6/8/19 at 20:30


Glucose (Glutose) 15 gm Q15M  PRN BUCCAL DECREASED GLUCOSE;  Start 6/8/19 at 


20:30


Budesonide (Pulmicort (Neb)) 0.5 mg BID RESP  THERAPY HHN  Last administered on 


6/12/19at 08:06; Admin Dose 0.5 MG;  Start 6/9/19 at 09:30


Albuterol/ Ipratropium (Duoneb) 3 ml Q6HWA RESP  THERAPY HHN  Last administered 


on 6/12/19at 08:06; Admin Dose 3 ML;  Start 6/9/19 at 14:00


Azithromycin (Zithromax) 250 mg NOW PO  Last administered on 6/12/19at 08:53; 


Admin Dose 250 MG;  Start 6/10/19 at 09:00;  Stop 6/13/19 at 09:01


Insulin Aspart (Novolog Insulin Pen) NOVOLOG *MODERATE* ALGORITHM WITH MEALS  


BEDTIME SC  Last administered on 6/12/19at 08:37; Admin Dose 8 UNIT;  Start 


6/9/19 at 21:00


Insulin Aspart (Novolog Insulin Pen) 15 unit WITH  MEALS SC  Last administered 


on 6/12/19at 08:37; Admin Dose 15 UNIT;  Start 6/10/19 at 11:50


Insulin Glargine (Lantus) 30 units DAILY@2000 SC  Last administered on 6/11/19at


20:33; Admin Dose 30 UNITS;  Start 6/10/19 at 20:00


Metoprolol Succinate (Toprol Xl) 50 mg DAILY PO  Last administered on 6/12/19at 


08:48; Admin Dose 50 MG;  Start 6/10/19 at 11:00


Alprazolam (Xanax) 1 mg Q8H  PRN PO ANXIETY Last administered on 6/12/19at 


01:34; Admin Dose 1 MG;  Start 6/10/19 at 15:30


Methylprednisolone Sodium Succinate (Solu-Medrol) 60 mg Q6 IV  Last administered


on 6/12/19at 06:02; Admin Dose 60 MG;  Start 6/11/19 at 12:00


Linagliptin (Tradjenta) 5 mg DAILY PO  Last administered on 6/12/19at 08:47; 


Admin Dose 5 MG;  Start 6/11/19 at 13:00











PARKER FIORE 12, 2019 11:08

## 2019-06-12 NOTE — PN
Date/Time of Note


Date/Time of Note


DATE: 6/12/19 


TIME: 13:58





Assessment/Plan


VTE Prophylaxis


Risk score (from Nsg)>0 risk:  4


SCD applied (from Nsg):  Yes


Pharmacological prophylaxis:  heparin





Lines/Catheters


IV Catheter Type (from Nrsg):  Peripheral IV


Urinary Cath still in place:  No





Assessment/Plan


Hospital Course


AOx3, pleasant


comfortable


Diffuse expiratory wheezing


Nonlabored


RRR





A/p:


69 yo male wilth DMII, asthma presenting with acute asthma/COPD exacerbation


- Very slow improvement.  Still wheezy.  Continue steroids, bronchodilators, 


azithromycin





DMII


- Titrate basal/bolus insulin


Result Diagram:  


6/10/19 0755                                                                    


           6/10/19 0755





Results 24hrs





Laboratory Tests


  Test
            6/11/19
17:30  6/11/19
20:23  6/12/19
08:33  6/12/19
12:28


  Bedside Glucose          116            152           279  H          176








Subjective


24 Hr Interval Summary


Free Text/Dictation


Still very wheezy and SOB.  Reports only mild improvement





Exam/Review of Systems


Exam


Vitals





Vital Signs


  Date      Temp  Pulse  Resp  B/P (MAP)   Pulse Ox  O2          O2 Flow    FiO2


Time                                                 Delivery    Rate


   6/12/19           82


     13:45


   6/12/19                 22                    95  Nasal             3.0


     13:11                                           Cannula


   6/12/19  98.2                   125/92


     12:03                          (103)


   6/12/19                                                                    30


     03:56








Intake and Output





6/11/19 6/11/19 6/12/19





1515:00


23:00


07:00





IntakeIntake Total


1200 ml


600 ml





BalanceBalance


1200 ml


600 ml














Results


Results 24hrs





Laboratory Tests


  Test
            6/11/19
17:30  6/11/19
20:23  6/12/19
08:33  6/12/19
12:28


  Bedside Glucose          116            152           279  H          176








Medications


Medication





Current Medications


IV Flush (NS 3 ml) 3 ml PER PROTOCOL IV ;  Start 6/8/19 at 20:00


Ondansetron HCl (Zofran Inj) 4 mg Q6H  PRN IV NAUSEA/VOMITING;  Start 6/8/19 at 


20:00


Acetaminophen (Tylenol Tab) 650 mg Q6H  PRN PO .PAIN 1-3 OR TEMP;  Start 6/8/19 


at 20:00


Acetaminophen/ Hydrocodone Bitart (Norco (5/325)) 1 tab Q6H  PRN PO .MOD PAIN 4-


6 Last administered on 6/11/19at 22:03; Admin Dose 1 TAB;  Start 6/8/19 at 20:00


Morphine Sulfate (morphine) 2 mg Q4H  PRN IV .SEVERE PAIN 7-10 Last administered


on 6/12/19at 00:08; Admin Dose 2 MG;  Start 6/8/19 at 20:00


Docusate Sodium (Colace) 100 mg Q12H  PRN PO .CONSTIPATION;  Start 6/8/19 at 


20:00


Zolpidem Tartrate (Ambien) 5 mg QHS  PRN PO .INSOMNIA Last administered on 


6/10/19at 00:08; Admin Dose 5 MG;  Start 6/8/19 at 20:00


Enoxaparin Sodium (Lovenox) 40 mg DAILY SC  Last administered on 6/12/19at 


08:51; Admin Dose 40 MG;  Start 6/9/19 at 09:00


Albuterol/ Ipratropium (Duoneb) 3 ml Q4H RESP THERAPY  PRN HHN SHORTNESS OF 


BREATH Last administered on 6/12/19at 01:39; Admin Dose 3 ML;  Start 6/8/19 at 


20:00


Atorvastatin Calcium (Lipitor) 20 mg QHS PO  Last administered on 6/11/19at 


20:24; Admin Dose 20 MG;  Start 6/8/19 at 21:00


Lisinopril (Zestril) 40 mg DAILY PO  Last administered on 6/12/19at 08:48; Admin


Dose 40 MG;  Start 6/9/19 at 09:00


Tamsulosin HCl (Flomax) 0.4 mg DAILY PO  Last administered on 6/12/19at 08:47; 


Admin Dose 0.4 MG;  Start 6/9/19 at 09:00


Venlafaxine HCl (Effexor) 50 mg DAILY PO  Last administered on 6/12/19at 08:54; 


Admin Dose 50 MG;  Start 6/9/19 at 09:00


Miscellaneous Information 1 ea NOTE XX ;  Start 6/8/19 at 20:30


Glucose (Glutose) 15 gm Q15M  PRN PO DECREASED GLUCOSE;  Start 6/8/19 at 20:30


Glucose (Glutose) 22.5 gm Q15M  PRN PO DECREASED GLUCOSE;  Start 6/8/19 at 20:30


Dextrose (D50w Syringe) 25 ml Q15M  PRN IV DECREASED GLUCOSE;  Start 6/8/19 at 


20:30


Dextrose (D50w Syringe) 50 ml Q15M  PRN IV DECREASED GLUCOSE;  Start 6/8/19 at 


20:30


Glucagon (Glucagen) 1 mg Q15M  PRN IM DECREASED GLUCOSE;  Start 6/8/19 at 20:30


Glucose (Glutose) 15 gm Q15M  PRN BUCCAL DECREASED GLUCOSE;  Start 6/8/19 at 


20:30


Budesonide (Pulmicort (Neb)) 0.5 mg BID RESP  THERAPY HHN  Last administered on 


6/12/19at 08:06; Admin Dose 0.5 MG;  Start 6/9/19 at 09:30


Albuterol/ Ipratropium (Duoneb) 3 ml Q6HWA RESP  THERAPY HHN  Last administered 


on 6/12/19at 13:11; Admin Dose 3 ML;  Start 6/9/19 at 14:00


Azithromycin (Zithromax) 250 mg NOW PO  Last administered on 6/12/19at 08:53; 


Admin Dose 250 MG;  Start 6/10/19 at 09:00;  Stop 6/13/19 at 09:01


Insulin Aspart (Novolog Insulin Pen) NOVOLOG *MODERATE* ALGORITHM WITH MEALS  


BEDTIME SC  Last administered on 6/12/19at 12:39; Admin Dose 2 UNIT;  Start 


6/9/19 at 21:00


Insulin Aspart (Novolog Insulin Pen) 15 unit WITH  MEALS SC  Last administered 


on 6/12/19at 12:40; Admin Dose 15 UNIT;  Start 6/10/19 at 11:50


Insulin Glargine (Lantus) 30 units DAILY@2000 SC  Last administered on 6/11/19at


20:33; Admin Dose 30 UNITS;  Start 6/10/19 at 20:00


Metoprolol Succinate (Toprol Xl) 50 mg DAILY PO  Last administered on 6/12/19at 


08:48; Admin Dose 50 MG;  Start 6/10/19 at 11:00


Alprazolam (Xanax) 1 mg Q8H  PRN PO ANXIETY Last administered on 6/12/19at 


01:34; Admin Dose 1 MG;  Start 6/10/19 at 15:30


Methylprednisolone Sodium Succinate (Solu-Medrol) 60 mg Q6 IV  Last administered


on 6/12/19at 12:30; Admin Dose 60 MG;  Start 6/11/19 at 12:00


Linagliptin (Tradjenta) 5 mg DAILY PO  Last administered on 6/12/19at 08:47; 


Admin Dose 5 MG;  Start 6/11/19 at 13:00











ML MIRELES MD          Jun 12, 2019 13:59

## 2019-06-13 VITALS — DIASTOLIC BLOOD PRESSURE: 98 MMHG | SYSTOLIC BLOOD PRESSURE: 131 MMHG | RESPIRATION RATE: 22 BRPM | HEART RATE: 130 BPM

## 2019-06-13 VITALS — RESPIRATION RATE: 18 BRPM | HEART RATE: 108 BPM | DIASTOLIC BLOOD PRESSURE: 85 MMHG | SYSTOLIC BLOOD PRESSURE: 122 MMHG

## 2019-06-13 VITALS — HEART RATE: 116 BPM | DIASTOLIC BLOOD PRESSURE: 98 MMHG | SYSTOLIC BLOOD PRESSURE: 138 MMHG | RESPIRATION RATE: 21 BRPM

## 2019-06-13 VITALS — HEART RATE: 72 BPM

## 2019-06-13 VITALS — RESPIRATION RATE: 20 BRPM | DIASTOLIC BLOOD PRESSURE: 70 MMHG | HEART RATE: 87 BPM | SYSTOLIC BLOOD PRESSURE: 130 MMHG

## 2019-06-13 VITALS — HEART RATE: 71 BPM

## 2019-06-13 VITALS — HEART RATE: 127 BPM

## 2019-06-13 VITALS — RESPIRATION RATE: 18 BRPM | SYSTOLIC BLOOD PRESSURE: 121 MMHG | DIASTOLIC BLOOD PRESSURE: 90 MMHG | HEART RATE: 75 BPM

## 2019-06-13 VITALS — SYSTOLIC BLOOD PRESSURE: 179 MMHG | HEART RATE: 100 BPM | RESPIRATION RATE: 20 BRPM | DIASTOLIC BLOOD PRESSURE: 98 MMHG

## 2019-06-13 VITALS — HEART RATE: 60 BPM

## 2019-06-13 VITALS — HEART RATE: 154 BPM

## 2019-06-13 VITALS — DIASTOLIC BLOOD PRESSURE: 90 MMHG | HEART RATE: 92 BPM | RESPIRATION RATE: 18 BRPM | SYSTOLIC BLOOD PRESSURE: 123 MMHG

## 2019-06-13 VITALS — HEART RATE: 116 BPM

## 2019-06-13 VITALS — HEART RATE: 63 BPM

## 2019-06-13 RX ADMIN — AZITHROMYCIN SCH MG: 250 TABLET, FILM COATED ORAL at 08:54

## 2019-06-13 RX ADMIN — VENLAFAXINE SCH MG: 25 TABLET ORAL at 09:43

## 2019-06-13 RX ADMIN — IPRATROPIUM BROMIDE AND ALBUTEROL SULFATE 1 ML: .5; 3 SOLUTION RESPIRATORY (INHALATION) at 08:30

## 2019-06-13 RX ADMIN — IPRATROPIUM BROMIDE AND ALBUTEROL SULFATE 1 ML: .5; 3 SOLUTION RESPIRATORY (INHALATION) at 13:51

## 2019-06-13 RX ADMIN — METOPROLOL SUCCINATE SCH MG: 50 TABLET, EXTENDED RELEASE ORAL at 08:51

## 2019-06-13 RX ADMIN — LISINOPRIL SCH MG: 20 TABLET ORAL at 08:50

## 2019-06-13 RX ADMIN — MORPHINE SULFATE 1 MG: 2 INJECTION, SOLUTION INTRAMUSCULAR; INTRAVENOUS at 01:55

## 2019-06-13 RX ADMIN — IPRATROPIUM BROMIDE AND ALBUTEROL SULFATE 1 ML: .5; 3 SOLUTION RESPIRATORY (INHALATION) at 23:19

## 2019-06-13 RX ADMIN — LINAGLIPTIN 1 MG: 5 TABLET, FILM COATED ORAL at 08:51

## 2019-06-13 RX ADMIN — VENLAFAXINE HYDROCHLORIDE 1 MG: 25 TABLET ORAL at 09:43

## 2019-06-13 RX ADMIN — AZITHROMYCIN 1 MG: 250 TABLET, FILM COATED ORAL at 08:54

## 2019-06-13 RX ADMIN — DILTIAZEM HYDROCHLORIDE 1 MG: 5 INJECTION INTRAVENOUS at 02:52

## 2019-06-13 RX ADMIN — ATORVASTATIN CALCIUM 1 MG: 20 TABLET, FILM COATED ORAL at 20:15

## 2019-06-13 RX ADMIN — BUDESONIDE SCH MG: 0.5 SUSPENSION RESPIRATORY (INHALATION) at 08:30

## 2019-06-13 RX ADMIN — LISINOPRIL 1 MG: 20 TABLET ORAL at 08:50

## 2019-06-13 RX ADMIN — METHYLPREDNISOLONE SODIUM SUCCINATE 1 MG: 125 INJECTION, POWDER, FOR SOLUTION INTRAMUSCULAR; INTRAVENOUS at 11:45

## 2019-06-13 RX ADMIN — METHYLPREDNISOLONE SODIUM SUCCINATE 1 MG: 125 INJECTION, POWDER, FOR SOLUTION INTRAMUSCULAR; INTRAVENOUS at 05:02

## 2019-06-13 RX ADMIN — MORPHINE SULFATE PRN MG: 2 INJECTION, SOLUTION INTRAMUSCULAR; INTRAVENOUS at 01:55

## 2019-06-13 RX ADMIN — INSULIN ASPART 1 UNIT: 100 INJECTION, SOLUTION INTRAVENOUS; SUBCUTANEOUS at 11:43

## 2019-06-13 RX ADMIN — INSULIN GLARGINE SCH UNITS: 100 INJECTION, SOLUTION SUBCUTANEOUS at 20:18

## 2019-06-13 RX ADMIN — IPRATROPIUM BROMIDE AND ALBUTEROL SULFATE SCH ML: .5; 3 SOLUTION RESPIRATORY (INHALATION) at 20:09

## 2019-06-13 RX ADMIN — INSULIN ASPART 1 UNIT: 100 INJECTION, SOLUTION INTRAVENOUS; SUBCUTANEOUS at 08:07

## 2019-06-13 RX ADMIN — INSULIN ASPART 1 UNIT: 100 INJECTION, SOLUTION INTRAVENOUS; SUBCUTANEOUS at 20:15

## 2019-06-13 RX ADMIN — INSULIN ASPART 1 UNIT: 100 INJECTION, SOLUTION INTRAVENOUS; SUBCUTANEOUS at 17:09

## 2019-06-13 RX ADMIN — METHYLPREDNISOLONE SODIUM SUCCINATE 1 MG: 125 INJECTION, POWDER, FOR SOLUTION INTRAMUSCULAR; INTRAVENOUS at 23:09

## 2019-06-13 RX ADMIN — BUDESONIDE 1 MG: 0.5 SUSPENSION RESPIRATORY (INHALATION) at 20:09

## 2019-06-13 RX ADMIN — ENOXAPARIN SODIUM 1 MG: 100 INJECTION SUBCUTANEOUS at 08:49

## 2019-06-13 RX ADMIN — IPRATROPIUM BROMIDE AND ALBUTEROL SULFATE SCH ML: .5; 3 SOLUTION RESPIRATORY (INHALATION) at 08:30

## 2019-06-13 RX ADMIN — INSULIN ASPART 1 UNIT: 100 INJECTION, SOLUTION INTRAVENOUS; SUBCUTANEOUS at 17:06

## 2019-06-13 RX ADMIN — BUDESONIDE 1 MG: 0.5 SUSPENSION RESPIRATORY (INHALATION) at 08:30

## 2019-06-13 RX ADMIN — IPRATROPIUM BROMIDE AND ALBUTEROL SULFATE SCH ML: .5; 3 SOLUTION RESPIRATORY (INHALATION) at 13:51

## 2019-06-13 RX ADMIN — METOPROLOL SUCCINATE 1 MG: 50 TABLET, EXTENDED RELEASE ORAL at 08:51

## 2019-06-13 RX ADMIN — INSULIN ASPART 1 UNIT: 100 INJECTION, SOLUTION INTRAVENOUS; SUBCUTANEOUS at 08:08

## 2019-06-13 RX ADMIN — ENOXAPARIN SODIUM SCH MG: 100 INJECTION SUBCUTANEOUS at 08:49

## 2019-06-13 RX ADMIN — IPRATROPIUM BROMIDE AND ALBUTEROL SULFATE PRN ML: .5; 3 SOLUTION RESPIRATORY (INHALATION) at 23:19

## 2019-06-13 RX ADMIN — IPRATROPIUM BROMIDE AND ALBUTEROL SULFATE 1 ML: .5; 3 SOLUTION RESPIRATORY (INHALATION) at 20:09

## 2019-06-13 RX ADMIN — LORAZEPAM 1 MG: 2 INJECTION, SOLUTION INTRAMUSCULAR; INTRAVENOUS at 02:52

## 2019-06-13 RX ADMIN — ATORVASTATIN CALCIUM SCH MG: 20 TABLET, FILM COATED ORAL at 20:15

## 2019-06-13 RX ADMIN — METHYLPREDNISOLONE SODIUM SUCCINATE 1 MG: 125 INJECTION, POWDER, FOR SOLUTION INTRAMUSCULAR; INTRAVENOUS at 17:10

## 2019-06-13 RX ADMIN — LINAGLIPTIN SCH MG: 5 TABLET, FILM COATED ORAL at 08:51

## 2019-06-13 RX ADMIN — TAMSULOSIN HYDROCHLORIDE 1 MG: 0.4 CAPSULE ORAL at 08:50

## 2019-06-13 RX ADMIN — METOPROLOL SUCCINATE 1 MG: 50 TABLET, EXTENDED RELEASE ORAL at 11:46

## 2019-06-13 RX ADMIN — TAMSULOSIN HYDROCHLORIDE SCH MG: 0.4 CAPSULE ORAL at 08:50

## 2019-06-13 RX ADMIN — INSULIN GLARGINE 1 UNITS: 100 INJECTION, SOLUTION SUBCUTANEOUS at 20:18

## 2019-06-13 RX ADMIN — BUDESONIDE SCH MG: 0.5 SUSPENSION RESPIRATORY (INHALATION) at 20:09

## 2019-06-13 NOTE — CONS
Assessment/Plan


Assessment/Plan


Assessment/Plan (Daily)


Assessment and recommendations;





1.  Patient admitted with severe asthma exacerbation with apparent suboptimally 


controlled asthma on an outpatient basis.  Despite aggressive treatment patient 


is responding very slowly.


2.  History of diabetes and hypertension.


3.  Possibly sleep apnea.





Continue supportive care and current treatment regimen.





Consultation Date/Type/Reason


Admit Date/Time


Jun 8, 2019 at 17:27


Initial Consult Date


6/9/19


Type of Consult


Pulmonary





Patient is a pleasant 68-year-old gentleman who came into the hospital with a 


few days history of increased wheezing, coughing, production of white-yellow spu


loren.  Upon evaluation patient has been diagnosed with asthma exacerbation and 


treated with appropriate modality regimen.  Patient is reporting some 


improvement since admission.  Patient does complain of chronic symptoms of 


asthma which apparently is poorly controlled on an outpatient basis.  He does 


complain of frequent flareups as well.  Patient however denies any high fever, 


chills, body aches or myalgias and also denies any sinus symptoms.





Past medical history; 





1.  Asthma


2.  Diabetes and hypertension.





Medications; reviewed.


Allergies; sulfa drugs.


Social history; noncontributory.


Family history; he is single, he does not have any children.  No show any asthma


in the family.


Occupational history; patient is on disability.


Review of systems; denies any headache, seizures, sinus symptoms.  Any 


dysphagia.  Any chest pain or angina.  Complains of cough with wheezing as well 


as mild dyspnea on exertion.  Denies any abdominal pain, nausea vomiting.  Any 


melena or hematochezia.  Any urinary symptoms.  Denies any weight gain.  Denies 


any orthopnea.  Complains of occasional snoring and daytime sleepiness.


General exam; elderly male, looks younger than age.  Currently no distress.  La


javy comfortably in bed.


Date/Time of Note


DATE: 6/13/19 


TIME: 11:06





24 HR Interval Summary


Free Text/Dictation


Patient condition has mildly improved.  Still complains of shortness of breath, 


with wheezing.


General exam; elderly male, awake alert, currently in no distress.





Exam/Review of Systems


Exam


Vitals





Vital Signs


  Date      Temp  Pulse  Resp  B/P (MAP)   Pulse Ox  O2          O2 Flow    FiO2


Time                                                 Delivery    Rate


   6/13/19                                           Nasal             2.0


     09:14                                           Cannula


   6/13/19          110    24                    95


     08:16


   6/13/19  97.6                   179/98


     07:35                          (125)


   6/13/19                                                                    30


     02:10








Intake and Output





6/12/19 6/12/19 6/13/19





1515:00


23:00


07:00





IntakeIntake Total


1200 ml


250 ml





BalanceBalance


1200 ml


250 ml











Exam


H EENT exam; supple neck, no JVD.  No lymphadenopathy.  Midline trachea.  No 


thyromegaly.  Patient has good dentition.  No neck masses.


Chest exam; diminished breath sounds bilaterally with bilateral expiratory 


wheezing.  S1-S2 audible, no murmurs.  Regular rhythm.


Abdomen exam; soft, protuberant.  Nontender.  No organomegaly.  Bowel sounds are


audible.


Extremity exam; no peripheral edema clubbing.


CNS exam;no  focal deficit.





Results


Result Diagram:  


6/10/19 0755                                                                    


           6/10/19 0755





Results 24hrs





Laboratory Tests


  Test
            6/12/19
12:28  6/12/19
17:41  6/12/19
20:06  6/13/19
01:38


  Bedside Glucose          176            162            153           234  H


  Test
            6/13/19
08:00  
              
              



  Bedside Glucose          153








Medications


Medication





Current Medications


IV Flush (NS 3 ml) 3 ml PER PROTOCOL IV ;  Start 6/8/19 at 20:00


Ondansetron HCl (Zofran Inj) 4 mg Q6H  PRN IV NAUSEA/VOMITING;  Start 6/8/19 at 


20:00


Acetaminophen (Tylenol Tab) 650 mg Q6H  PRN PO .PAIN 1-3 OR TEMP;  Start 6/8/19 


at 20:00


Acetaminophen/ Hydrocodone Bitart (Norco (5/325)) 1 tab Q6H  PRN PO .MOD PAIN 


4-6 Last administered on 6/12/19at 20:06; Admin Dose 1 TAB;  Start 6/8/19 at 


20:00


Morphine Sulfate (morphine) 2 mg Q4H  PRN IV .SEVERE PAIN 7-10 Last administered


on 6/13/19at 01:55; Admin Dose 2 MG;  Start 6/8/19 at 20:00


Docusate Sodium (Colace) 100 mg Q12H  PRN PO .CONSTIPATION;  Start 6/8/19 at 20


:00


Zolpidem Tartrate (Ambien) 5 mg QHS  PRN PO .INSOMNIA Last administered on 


6/12/19at 23:41; Admin Dose 5 MG;  Start 6/8/19 at 20:00


Enoxaparin Sodium (Lovenox) 40 mg DAILY SC  Last administered on 6/13/19at 


08:49; Admin Dose 40 MG;  Start 6/9/19 at 09:00


Albuterol/ Ipratropium (Duoneb) 3 ml Q4H RESP THERAPY  PRN HHN SHORTNESS OF 


BREATH Last administered on 6/12/19at 01:39; Admin Dose 3 ML;  Start 6/8/19 at 


20:00


Atorvastatin Calcium (Lipitor) 20 mg QHS PO  Last administered on 6/12/19at 20:0


6; Admin Dose 20 MG;  Start 6/8/19 at 21:00


Lisinopril (Zestril) 40 mg DAILY PO  Last administered on 6/13/19at 08:50; Admin


Dose 40 MG;  Start 6/9/19 at 09:00


Tamsulosin HCl (Flomax) 0.4 mg DAILY PO  Last administered on 6/13/19at 08:50; 


Admin Dose 0.4 MG;  Start 6/9/19 at 09:00


Venlafaxine HCl (Effexor) 50 mg DAILY PO  Last administered on 6/13/19at 09:43; 


Admin Dose 50 MG;  Start 6/9/19 at 09:00


Miscellaneous Information 1 ea NOTE XX ;  Start 6/8/19 at 20:30


Glucose (Glutose) 15 gm Q15M  PRN PO DECREASED GLUCOSE;  Start 6/8/19 at 20:30


Glucose (Glutose) 22.5 gm Q15M  PRN PO DECREASED GLUCOSE;  Start 6/8/19 at 20:30


Dextrose (D50w Syringe) 25 ml Q15M  PRN IV DECREASED GLUCOSE;  Start 6/8/19 at 


20:30


Dextrose (D50w Syringe) 50 ml Q15M  PRN IV DECREASED GLUCOSE;  Start 6/8/19 at 


20:30


Glucagon (Glucagen) 1 mg Q15M  PRN IM DECREASED GLUCOSE;  Start 6/8/19 at 20:30


Glucose (Glutose) 15 gm Q15M  PRN BUCCAL DECREASED GLUCOSE;  Start 6/8/19 at 


20:30


Budesonide (Pulmicort (Neb)) 0.5 mg BID RESP  THERAPY HHN  Last administered on 


6/13/19at 08:30; Admin Dose 0.5 MG;  Start 6/9/19 at 09:30


Albuterol/ Ipratropium (Duoneb) 3 ml Q6HWA RESP  THERAPY HHN  Last administered 


on 6/13/19at 08:30; Admin Dose 3 ML;  Start 6/9/19 at 14:00


Insulin Aspart (Novolog Insulin Pen) NOVOLOG *MODERATE* ALGORITHM WITH MEALS  


BEDTIME SC  Last administered on 6/13/19at 08:07; Admin Dose 2 UNIT;  Start 


6/9/19 at 21:00


Insulin Aspart (Novolog Insulin Pen) 15 unit WITH  MEALS SC  Last administered 


on 6/13/19at 08:08; Admin Dose 15 UNIT;  Start 6/10/19 at 11:50


Insulin Glargine (Lantus) 30 units DAILY@2000 SC  Last administered on 6/12/19at


20:10; Admin Dose 30 UNITS;  Start 6/10/19 at 20:00


Metoprolol Succinate (Toprol Xl) 50 mg DAILY PO  Last administered on 6/13/19at 


08:51; Admin Dose 50 MG;  Start 6/10/19 at 11:00


Alprazolam (Xanax) 1 mg Q8H  PRN PO ANXIETY Last administered on 6/12/19at 


01:34; Admin Dose 1 MG;  Start 6/10/19 at 15:30


Methylprednisolone Sodium Succinate (Solu-Medrol) 60 mg Q6 IV  Last administered


on 6/13/19at 05:02; Admin Dose 60 MG;  Start 6/11/19 at 12:00


Linagliptin (Tradjenta) 5 mg DAILY PO  Last administered on 6/13/19at 08:51; 


Admin Dose 5 MG;  Start 6/11/19 at 13:00


Metoprolol Succinate (Toprol Xl) 50 mg DAILY PO ;  Start 6/13/19 at 11:00;  


Status SWAPNIL FIORE,PARKER                    Jun 13, 2019 11:08

## 2019-06-13 NOTE — PN
Date/Time of Note


Date/Time of Note


DATE: 6/13/19 


TIME: 11:42





Assessment/Plan


VTE Prophylaxis


Risk score (from Nsg)>0 risk:  4


SCD applied (from Nsg):  Yes


Pharmacological prophylaxis:  heparin





Lines/Catheters


IV Catheter Type (from Nrsg):  Saline Lock


Urinary Cath still in place:  No





Assessment/Plan


Hospital Course


AOx3, pleasant


comfortable


Diffuse expiratory wheezing


Nonlabored


Tachy, irreg, irreg





A/p:


69 yo male wilth DMII, asthma presenting with acute asthma exacerbation


- Very slow improvement.  Still wheezy.  Continue steroids, bronchodilators, 


azithromycin





DMII


- Titrate basal/bolus insulin





A Fib:


- Increase Toprol to 100 mg daily


- Add AC at discharge


Result Diagram:  


6/10/19 0755                                                                    


           6/10/19 0755





Results 24hrs





Laboratory Tests


  Test
            6/12/19
12:28  6/12/19
17:41  6/12/19
20:06  6/13/19
01:38


  Bedside Glucose          176            162            153           234  H


  Test
            6/13/19
08:00  6/13/19
11:18  
              



  Bedside Glucose          153           240  H








Subjective


24 Hr Interval Summary


Free Text/Dictation


A Fib with RVR overnight


Still quite wheezy and SOB





Exam/Review of Systems


Exam


Vitals





Vital Signs


  Date      Temp  Pulse  Resp  B/P (MAP)   Pulse Ox  O2          O2 Flow    FiO2


Time                                                 Delivery    Rate


   6/13/19  97.7    108    18      122/85        98


     11:32                           (97)


   6/13/19                                           Nasal             2.0


     09:14                                           Cannula


   6/13/19                                                                    30


     02:10








Intake and Output





6/12/19 6/12/19 6/13/19





1515:00


23:00


07:00





IntakeIntake Total


1200 ml


250 ml





BalanceBalance


1200 ml


250 ml














Results


Results 24hrs





Laboratory Tests


  Test
            6/12/19
12:28  6/12/19
17:41  6/12/19
20:06  6/13/19
01:38


  Bedside Glucose          176            162            153           234  H


  Test
            6/13/19
08:00  6/13/19
11:18  
              



  Bedside Glucose          153           240  H








Medications


Medication





Current Medications


IV Flush (NS 3 ml) 3 ml PER PROTOCOL IV ;  Start 6/8/19 at 20:00


Ondansetron HCl (Zofran Inj) 4 mg Q6H  PRN IV NAUSEA/VOMITING;  Start 6/8/19 at 


20:00


Acetaminophen (Tylenol Tab) 650 mg Q6H  PRN PO .PAIN 1-3 OR TEMP;  Start 6/8/19 


at 20:00


Acetaminophen/ Hydrocodone Bitart (Norco (5/325)) 1 tab Q6H  PRN PO .MOD PAIN 4-


6 Last administered on 6/12/19at 20:06; Admin Dose 1 TAB;  Start 6/8/19 at 20:00


Morphine Sulfate (morphine) 2 mg Q4H  PRN IV .SEVERE PAIN 7-10 Last administered


on 6/13/19at 01:55; Admin Dose 2 MG;  Start 6/8/19 at 20:00


Docusate Sodium (Colace) 100 mg Q12H  PRN PO .CONSTIPATION;  Start 6/8/19 at 


20:00


Zolpidem Tartrate (Ambien) 5 mg QHS  PRN PO .INSOMNIA Last administered on 


6/12/19at 23:41; Admin Dose 5 MG;  Start 6/8/19 at 20:00


Enoxaparin Sodium (Lovenox) 40 mg DAILY SC  Last administered on 6/13/19at 


08:49; Admin Dose 40 MG;  Start 6/9/19 at 09:00


Albuterol/ Ipratropium (Duoneb) 3 ml Q4H RESP THERAPY  PRN HHN SHORTNESS OF 


BREATH Last administered on 6/12/19at 01:39; Admin Dose 3 ML;  Start 6/8/19 at 


20:00


Atorvastatin Calcium (Lipitor) 20 mg QHS PO  Last administered on 6/12/19at 


20:06; Admin Dose 20 MG;  Start 6/8/19 at 21:00


Lisinopril (Zestril) 40 mg DAILY PO  Last administered on 6/13/19at 08:50; Admin


Dose 40 MG;  Start 6/9/19 at 09:00


Tamsulosin HCl (Flomax) 0.4 mg DAILY PO  Last administered on 6/13/19at 08:50; 


Admin Dose 0.4 MG;  Start 6/9/19 at 09:00


Venlafaxine HCl (Effexor) 50 mg DAILY PO  Last administered on 6/13/19at 09:43; 


Admin Dose 50 MG;  Start 6/9/19 at 09:00


Miscellaneous Information 1 ea NOTE XX ;  Start 6/8/19 at 20:30


Glucose (Glutose) 15 gm Q15M  PRN PO DECREASED GLUCOSE;  Start 6/8/19 at 20:30


Glucose (Glutose) 22.5 gm Q15M  PRN PO DECREASED GLUCOSE;  Start 6/8/19 at 20:30


Dextrose (D50w Syringe) 25 ml Q15M  PRN IV DECREASED GLUCOSE;  Start 6/8/19 at 


20:30


Dextrose (D50w Syringe) 50 ml Q15M  PRN IV DECREASED GLUCOSE;  Start 6/8/19 at 


20:30


Glucagon (Glucagen) 1 mg Q15M  PRN IM DECREASED GLUCOSE;  Start 6/8/19 at 20:30


Glucose (Glutose) 15 gm Q15M  PRN BUCCAL DECREASED GLUCOSE;  Start 6/8/19 at 


20:30


Budesonide (Pulmicort (Neb)) 0.5 mg BID RESP  THERAPY HHN  Last administered on 


6/13/19at 08:30; Admin Dose 0.5 MG;  Start 6/9/19 at 09:30


Albuterol/ Ipratropium (Duoneb) 3 ml Q6HWA RESP  THERAPY HHN  Last administered 


on 6/13/19at 08:30; Admin Dose 3 ML;  Start 6/9/19 at 14:00


Insulin Aspart (Novolog Insulin Pen) NOVOLOG *MODERATE* ALGORITHM WITH MEALS  


BEDTIME SC  Last administered on 6/13/19at 08:07; Admin Dose 2 UNIT;  Start 


6/9/19 at 21:00


Insulin Aspart (Novolog Insulin Pen) 15 unit WITH  MEALS SC  Last administered 


on 6/13/19at 08:08; Admin Dose 15 UNIT;  Start 6/10/19 at 11:50


Insulin Glargine (Lantus) 30 units DAILY@2000 SC  Last administered on 6/12/19at


20:10; Admin Dose 30 UNITS;  Start 6/10/19 at 20:00


Alprazolam (Xanax) 1 mg Q8H  PRN PO ANXIETY Last administered on 6/12/19at 


01:34; Admin Dose 1 MG;  Start 6/10/19 at 15:30


Methylprednisolone Sodium Succinate (Solu-Medrol) 60 mg Q6 IV  Last administered


on 6/13/19at 05:02; Admin Dose 60 MG;  Start 6/11/19 at 12:00


Linagliptin (Tradjenta) 5 mg DAILY PO  Last administered on 6/13/19at 08:51; 


Admin Dose 5 MG;  Start 6/11/19 at 13:00


Metoprolol Succinate (Toprol Xl) 100 mg DAILY PO ;  Start 6/14/19 at 09:00











ML MIRELES MD          Jun 13, 2019 11:43

## 2019-06-14 VITALS — HEART RATE: 65 BPM | DIASTOLIC BLOOD PRESSURE: 79 MMHG | RESPIRATION RATE: 20 BRPM | SYSTOLIC BLOOD PRESSURE: 124 MMHG

## 2019-06-14 VITALS — RESPIRATION RATE: 21 BRPM | DIASTOLIC BLOOD PRESSURE: 86 MMHG | HEART RATE: 82 BPM | SYSTOLIC BLOOD PRESSURE: 139 MMHG

## 2019-06-14 VITALS — DIASTOLIC BLOOD PRESSURE: 67 MMHG | RESPIRATION RATE: 22 BRPM | SYSTOLIC BLOOD PRESSURE: 146 MMHG

## 2019-06-14 VITALS — RESPIRATION RATE: 21 BRPM | DIASTOLIC BLOOD PRESSURE: 87 MMHG | SYSTOLIC BLOOD PRESSURE: 136 MMHG | HEART RATE: 56 BPM

## 2019-06-14 VITALS — RESPIRATION RATE: 18 BRPM | SYSTOLIC BLOOD PRESSURE: 123 MMHG | DIASTOLIC BLOOD PRESSURE: 85 MMHG | HEART RATE: 82 BPM

## 2019-06-14 VITALS — SYSTOLIC BLOOD PRESSURE: 147 MMHG | HEART RATE: 85 BPM | DIASTOLIC BLOOD PRESSURE: 70 MMHG | RESPIRATION RATE: 19 BRPM

## 2019-06-14 VITALS — HEART RATE: 82 BPM

## 2019-06-14 VITALS — HEART RATE: 76 BPM

## 2019-06-14 VITALS — HEART RATE: 56 BPM | SYSTOLIC BLOOD PRESSURE: 125 MMHG | RESPIRATION RATE: 22 BRPM | DIASTOLIC BLOOD PRESSURE: 81 MMHG

## 2019-06-14 VITALS — HEART RATE: 74 BPM

## 2019-06-14 VITALS — HEART RATE: 71 BPM

## 2019-06-14 VITALS — HEART RATE: 72 BPM

## 2019-06-14 RX ADMIN — ENOXAPARIN SODIUM SCH MG: 100 INJECTION SUBCUTANEOUS at 08:55

## 2019-06-14 RX ADMIN — BUDESONIDE SCH MG: 0.5 SUSPENSION RESPIRATORY (INHALATION) at 19:48

## 2019-06-14 RX ADMIN — VENLAFAXINE SCH MG: 25 TABLET ORAL at 08:51

## 2019-06-14 RX ADMIN — INSULIN ASPART 1 UNIT: 100 INJECTION, SOLUTION INTRAVENOUS; SUBCUTANEOUS at 11:30

## 2019-06-14 RX ADMIN — INSULIN GLARGINE SCH UNITS: 100 INJECTION, SOLUTION SUBCUTANEOUS at 20:14

## 2019-06-14 RX ADMIN — LISINOPRIL SCH MG: 20 TABLET ORAL at 08:51

## 2019-06-14 RX ADMIN — ENOXAPARIN SODIUM 1 MG: 100 INJECTION SUBCUTANEOUS at 08:55

## 2019-06-14 RX ADMIN — INSULIN ASPART 1 UNIT: 100 INJECTION, SOLUTION INTRAVENOUS; SUBCUTANEOUS at 11:27

## 2019-06-14 RX ADMIN — METHYLPREDNISOLONE SODIUM SUCCINATE 1 MG: 125 INJECTION, POWDER, FOR SOLUTION INTRAMUSCULAR; INTRAVENOUS at 05:34

## 2019-06-14 RX ADMIN — METOPROLOL SUCCINATE SCH MG: 50 TABLET, EXTENDED RELEASE ORAL at 08:50

## 2019-06-14 RX ADMIN — IPRATROPIUM BROMIDE AND ALBUTEROL SULFATE SCH ML: .5; 3 SOLUTION RESPIRATORY (INHALATION) at 19:48

## 2019-06-14 RX ADMIN — INSULIN ASPART 1 UNIT: 100 INJECTION, SOLUTION INTRAVENOUS; SUBCUTANEOUS at 17:17

## 2019-06-14 RX ADMIN — VENLAFAXINE HYDROCHLORIDE 1 MG: 25 TABLET ORAL at 08:51

## 2019-06-14 RX ADMIN — IPRATROPIUM BROMIDE AND ALBUTEROL SULFATE SCH ML: .5; 3 SOLUTION RESPIRATORY (INHALATION) at 08:36

## 2019-06-14 RX ADMIN — LINAGLIPTIN SCH MG: 5 TABLET, FILM COATED ORAL at 08:51

## 2019-06-14 RX ADMIN — TAMSULOSIN HYDROCHLORIDE 1 MG: 0.4 CAPSULE ORAL at 08:51

## 2019-06-14 RX ADMIN — LINAGLIPTIN 1 MG: 5 TABLET, FILM COATED ORAL at 08:51

## 2019-06-14 RX ADMIN — INSULIN ASPART 1 UNIT: 100 INJECTION, SOLUTION INTRAVENOUS; SUBCUTANEOUS at 17:18

## 2019-06-14 RX ADMIN — INSULIN ASPART 1 UNIT: 100 INJECTION, SOLUTION INTRAVENOUS; SUBCUTANEOUS at 20:14

## 2019-06-14 RX ADMIN — INSULIN ASPART 1 UNIT: 100 INJECTION, SOLUTION INTRAVENOUS; SUBCUTANEOUS at 08:09

## 2019-06-14 RX ADMIN — IPRATROPIUM BROMIDE AND ALBUTEROL SULFATE 1 ML: .5; 3 SOLUTION RESPIRATORY (INHALATION) at 08:36

## 2019-06-14 RX ADMIN — TAMSULOSIN HYDROCHLORIDE SCH MG: 0.4 CAPSULE ORAL at 08:51

## 2019-06-14 RX ADMIN — ATORVASTATIN CALCIUM SCH MG: 20 TABLET, FILM COATED ORAL at 20:06

## 2019-06-14 RX ADMIN — METHYLPREDNISOLONE SODIUM SUCCINATE 1 MG: 125 INJECTION, POWDER, FOR SOLUTION INTRAMUSCULAR; INTRAVENOUS at 17:22

## 2019-06-14 RX ADMIN — IPRATROPIUM BROMIDE AND ALBUTEROL SULFATE SCH ML: .5; 3 SOLUTION RESPIRATORY (INHALATION) at 13:52

## 2019-06-14 RX ADMIN — BUDESONIDE 1 MG: 0.5 SUSPENSION RESPIRATORY (INHALATION) at 08:36

## 2019-06-14 RX ADMIN — LISINOPRIL 1 MG: 20 TABLET ORAL at 08:51

## 2019-06-14 RX ADMIN — IPRATROPIUM BROMIDE AND ALBUTEROL SULFATE 1 ML: .5; 3 SOLUTION RESPIRATORY (INHALATION) at 19:48

## 2019-06-14 RX ADMIN — ATORVASTATIN CALCIUM 1 MG: 20 TABLET, FILM COATED ORAL at 20:06

## 2019-06-14 RX ADMIN — METHYLPREDNISOLONE SODIUM SUCCINATE 1 MG: 125 INJECTION, POWDER, FOR SOLUTION INTRAMUSCULAR; INTRAVENOUS at 12:17

## 2019-06-14 RX ADMIN — IPRATROPIUM BROMIDE AND ALBUTEROL SULFATE 1 ML: .5; 3 SOLUTION RESPIRATORY (INHALATION) at 13:52

## 2019-06-14 RX ADMIN — METOPROLOL SUCCINATE 1 MG: 50 TABLET, EXTENDED RELEASE ORAL at 08:50

## 2019-06-14 RX ADMIN — INSULIN GLARGINE 1 UNITS: 100 INJECTION, SOLUTION SUBCUTANEOUS at 20:14

## 2019-06-14 RX ADMIN — ZOLPIDEM TARTRATE 1 MG: 5 TABLET, FILM COATED ORAL at 21:57

## 2019-06-14 RX ADMIN — BUDESONIDE 1 MG: 0.5 SUSPENSION RESPIRATORY (INHALATION) at 19:48

## 2019-06-14 RX ADMIN — ZOLPIDEM TARTRATE PRN MG: 5 TABLET, FILM COATED ORAL at 21:57

## 2019-06-14 RX ADMIN — BUDESONIDE SCH MG: 0.5 SUSPENSION RESPIRATORY (INHALATION) at 08:36

## 2019-06-14 NOTE — PN
Date/Time of Note


Date/Time of Note


DATE: 6/14/19 


TIME: 12:12





Assessment/Plan


VTE Prophylaxis


Risk score (from Nsg)>0 risk:  3


SCD applied (from Nsg):  Yes


Pharmacological prophylaxis:  heparin





Lines/Catheters


IV Catheter Type (from Nrsg):  Saline Lock


Urinary Cath still in place:  No





Assessment/Plan


Hospital Course


AOx3, pleasant


comfortable


Diffuse expiratory wheezing


Nonlabored


Tachy, irreg, irreg





A/p:


69 yo male wilth DMII, asthma presenting with acute asthma exacerbation


- Very slow improvement.  Still wheezy.  Continue steroids, bronchodilators, 


azithromycin





DMII


- Titrate basal/bolus insulin





A Fib:


- Increase Toprol to 100 mg daily


- Add AC at discharge


Result Diagram:  


6/10/19 0755                                                                    


           6/10/19 0755





Results 24hrs





Laboratory Tests


  Test
            6/13/19
17:04  6/13/19
20:14  6/14/19
08:04  6/14/19
11:26


  Bedside Glucose          100            175           252  H          120








Subjective


24 Hr Interval Summary


Free Text/Dictation


Slowly improving


less SOB today





Exam/Review of Systems


Exam


Vitals





Vital Signs


  Date      Temp  Pulse  Resp  B/P (MAP)   Pulse Ox  O2          O2 Flow    FiO2


Time                                                 Delivery    Rate


   6/14/19           74


     12:05


   6/14/19  98.0           22      125/81        96  Nasal             2.0


     11:51                           (96)            Cannula


   6/14/19                                                                    30


     01:14








Intake and Output





6/13/19 6/13/19 6/14/19





1515:00


23:00


07:00





IntakeIntake Total


1050 ml





BalanceBalance


1050 ml














Results


Results 24hrs





Laboratory Tests


  Test
            6/13/19
17:04  6/13/19
20:14  6/14/19
08:04  6/14/19
11:26


  Bedside Glucose          100            175           252  H          120








Medications


Medication





Current Medications


IV Flush (NS 3 ml) 3 ml PER PROTOCOL IV ;  Start 6/8/19 at 20:00


Ondansetron HCl (Zofran Inj) 4 mg Q6H  PRN IV NAUSEA/VOMITING;  Start 6/8/19 at 


20:00


Acetaminophen (Tylenol Tab) 650 mg Q6H  PRN PO .PAIN 1-3 OR TEMP;  Start 6/8/19 


at 20:00


Acetaminophen/ Hydrocodone Bitart (Norco (5/325)) 1 tab Q6H  PRN PO .MOD PAIN 4-


6 Last administered on 6/12/19at 20:06; Admin Dose 1 TAB;  Start 6/8/19 at 20:00


Morphine Sulfate (morphine) 2 mg Q4H  PRN IV .SEVERE PAIN 7-10 Last administered


on 6/13/19at 01:55; Admin Dose 2 MG;  Start 6/8/19 at 20:00


Docusate Sodium (Colace) 100 mg Q12H  PRN PO .CONSTIPATION;  Start 6/8/19 at 


20:00


Zolpidem Tartrate (Ambien) 5 mg QHS  PRN PO .INSOMNIA Last administered on 6/12/ 19at 23:41; Admin Dose 5 MG;  Start 6/8/19 at 20:00


Enoxaparin Sodium (Lovenox) 40 mg DAILY SC  Last administered on 6/14/19at 


08:55; Admin Dose 40 MG;  Start 6/9/19 at 09:00


Albuterol/ Ipratropium (Duoneb) 3 ml Q4H RESP THERAPY  PRN HHN SHORTNESS OF 


BREATH Last administered on 6/13/19at 23:19; Admin Dose 3 ML;  Start 6/8/19 at 


20:00


Atorvastatin Calcium (Lipitor) 20 mg QHS PO  Last administered on 6/13/19at 


20:15; Admin Dose 20 MG;  Start 6/8/19 at 21:00


Lisinopril (Zestril) 40 mg DAILY PO  Last administered on 6/14/19at 08:51; Admin


Dose 40 MG;  Start 6/9/19 at 09:00


Tamsulosin HCl (Flomax) 0.4 mg DAILY PO  Last administered on 6/14/19at 08:51; 


Admin Dose 0.4 MG;  Start 6/9/19 at 09:00


Venlafaxine HCl (Effexor) 50 mg DAILY PO  Last administered on 6/14/19at 08:51; 


Admin Dose 50 MG;  Start 6/9/19 at 09:00


Miscellaneous Information 1 ea NOTE XX ;  Start 6/8/19 at 20:30


Glucose (Glutose) 15 gm Q15M  PRN PO DECREASED GLUCOSE;  Start 6/8/19 at 20:30


Glucose (Glutose) 22.5 gm Q15M  PRN PO DECREASED GLUCOSE;  Start 6/8/19 at 20:30


Dextrose (D50w Syringe) 25 ml Q15M  PRN IV DECREASED GLUCOSE;  Start 6/8/19 at 


20:30


Dextrose (D50w Syringe) 50 ml Q15M  PRN IV DECREASED GLUCOSE;  Start 6/8/19 at 


20:30


Glucagon (Glucagen) 1 mg Q15M  PRN IM DECREASED GLUCOSE;  Start 6/8/19 at 20:30


Glucose (Glutose) 15 gm Q15M  PRN BUCCAL DECREASED GLUCOSE;  Start 6/8/19 at 


20:30


Budesonide (Pulmicort (Neb)) 0.5 mg BID RESP  THERAPY HHN  Last administered on 


6/14/19at 08:36; Admin Dose 0.5 MG;  Start 6/9/19 at 09:30


Albuterol/ Ipratropium (Duoneb) 3 ml Q6HWA RESP  THERAPY HHN  Last administered 


on 6/14/19at 08:36; Admin Dose 3 ML;  Start 6/9/19 at 14:00


Insulin Aspart (Novolog Insulin Pen) NOVOLOG *MODERATE* ALGORITHM WITH MEALS  


BEDTIME SC  Last administered on 6/14/19at 08:09; Admin Dose 6 UNIT;  Start 


6/9/19 at 21:00


Insulin Aspart (Novolog Insulin Pen) 15 unit WITH  MEALS SC  Last administered 


on 6/14/19at 11:30; Admin Dose 15 UNIT;  Start 6/10/19 at 11:50


Insulin Glargine (Lantus) 30 units DAILY@2000 SC  Last administered on 6/13/19at


20:18; Admin Dose 30 UNITS;  Start 6/10/19 at 20:00


Alprazolam (Xanax) 1 mg Q8H  PRN PO ANXIETY Last administered on 6/12/19at 


01:34; Admin Dose 1 MG;  Start 6/10/19 at 15:30


Methylprednisolone Sodium Succinate (Solu-Medrol) 60 mg Q6 IV  Last administered


on 6/14/19at 05:34; Admin Dose 60 MG;  Start 6/11/19 at 12:00


Linagliptin (Tradjenta) 5 mg DAILY PO  Last administered on 6/14/19at 08:51; 


Admin Dose 5 MG;  Start 6/11/19 at 13:00


Metoprolol Succinate (Toprol Xl) 100 mg DAILY PO  Last administered on 6/14/19at


08:50; Admin Dose 100 MG;  Start 6/14/19 at 09:00











ML MIRELES MD          Jun 14, 2019 12:12

## 2019-06-14 NOTE — CONS
Consultation Date/Type/Reason


Admit Date/Time


Jun 8, 2019 at 17:27


Initial Consult Date


6/9/19


Type of Consult


Pulmonary





Patient is a pleasant 68-year-old gentleman who came into the hospital with a 


few days history of increased wheezing, coughing, production of white-yellow 


sputum.  Upon evaluation patient has been diagnosed with asthma exacerbation and


treated with appropriate modality regimen.  Patient is reporting some 


improvement since admission.  Patient does complain of chronic symptoms of 


asthma which apparently is poorly controlled on an outpatient basis.  He does 


complain of frequent flareups as well.  Patient however denies any high fever, 


chills, body aches or myalgias and also denies any sinus symptoms.





Past medical history; 





1.  Asthma


2.  Diabetes and hypertension.





Medications; reviewed.


Allergies; sulfa drugs.


Social history; noncontributory.


Family history; he is single, he does not have any children.  No show any asthma


in the family.


Occupational history; patient is on disability.


Review of systems; denies any headache, seizures, sinus symptoms.  Any 


dysphagia.  Any chest pain or angina.  Complains of cough with wheezing as well 


as mild dyspnea on exertion.  Denies any abdominal pain, nausea vomiting.  Any 


melena or hematochezia.  Any urinary symptoms.  Denies any weight gain.  Denies 


any orthopnea.  Complains of occasional snoring and daytime sleepiness.


General exam; elderly male, looks younger than age.  Currently no distress.  


Laying comfortably in bed.


Date/Time of Note


DATE: 6/14/19 


TIME: 11:52





24 HR Interval Summary


Free Text/Dictation


Patient's condition is markedly improved over the last 24 hours.  Reports sig


nificant reduction in wheezing and shortness of breath.


General exam; elderly male, awake alert, currently in no distress.


H EENT exam; supple neck, no JVD.  No lymphadenopathy.  Midline trachea.  No 


thyromegaly.  No neck masses.  Patient has good dentition.


Chest exam; diminished breath sounds bilaterally without any wheezing.  S1-S2 


audible, no murmurs.  Regular rhythm.


Abdomen exam; soft, protuberant.  Nontender.  No organomegaly.  Bowel sounds are


audible.


Extremity exam; no peripheral edema clubbing.


CNS exam; no focal deficit.


Assessment and recommendations;





1.  Patient admitted with severe asthma exacerbation with


Interval improvement over the last 24 hours.  Patient currently on aggressive 


bronchodilator regimen.


2.  Apparently poorly controlled asthma on an outpatient basis.


3.  History of diabetes and hypertension.


4.  Possible underlying sleep apnea.





Continue current supportive care.  Steroid taper in 24 hours as dictated by 


clinical status.





Exam/Review of Systems


Exam


Vitals





Vital Signs


  Date      Temp  Pulse  Resp  B/P (MAP)   Pulse Ox  O2          O2 Flow    FiO2


Time                                                 Delivery    Rate


   6/14/19           79    22                    95  Nasal             2.0


     08:36                                           Cannula


   6/14/19  97.8                   146/67


     07:31                           (93)


   6/14/19                                                                    30


     01:14








Intake and Output





6/13/19 6/13/19 6/14/19





1515:00


23:00


07:00





IntakeIntake Total


1050 ml





BalanceBalance


1050 ml














Results


Result Diagram:  


6/10/19 0755                                                                    


           6/10/19 0755





Results 24hrs





Laboratory Tests


  Test
            6/13/19
17:04  6/13/19
20:14  6/14/19
08:04  6/14/19
11:26


  Bedside Glucose          100            175           252  H          120








Medications


Medication





Current Medications


IV Flush (NS 3 ml) 3 ml PER PROTOCOL IV ;  Start 6/8/19 at 20:00


Ondansetron HCl (Zofran Inj) 4 mg Q6H  PRN IV NAUSEA/VOMITING;  Start 6/8/19 at 


20:00


Acetaminophen (Tylenol Tab) 650 mg Q6H  PRN PO .PAIN 1-3 OR TEMP;  Start 6/8/19 


at 20:00


Acetaminophen/ Hydrocodone Bitart (Norco (5/325)) 1 tab Q6H  PRN PO .MOD PAIN 4-


6 Last administered on 6/12/19at 20:06; Admin Dose 1 TAB;  Start 6/8/19 at 20:00


Morphine Sulfate (morphine) 2 mg Q4H  PRN IV .SEVERE PAIN 7-10 Last administered


on 6/13/19at 01:55; Admin Dose 2 MG;  Start 6/8/19 at 20:00


Docusate Sodium (Colace) 100 mg Q12H  PRN PO .CONSTIPATION;  Start 6/8/19 at 


20:00


Zolpidem Tartrate (Ambien) 5 mg QHS  PRN PO .INSOMNIA Last administered on 


6/12/19at 23:41; Admin Dose 5 MG;  Start 6/8/19 at 20:00


Enoxaparin Sodium (Lovenox) 40 mg DAILY SC  Last administered on 6/14/19at 


08:55; Admin Dose 40 MG;  Start 6/9/19 at 09:00


Albuterol/ Ipratropium (Duoneb) 3 ml Q4H RESP THERAPY  PRN HHN SHORTNESS OF 


BREATH Last administered on 6/13/19at 23:19; Admin Dose 3 ML;  Start 6/8/19 at 


20:00


Atorvastatin Calcium (Lipitor) 20 mg QHS PO  Last administered on 6/13/19at 


20:15; Admin Dose 20 MG;  Start 6/8/19 at 21:00


Lisinopril (Zestril) 40 mg DAILY PO  Last administered on 6/14/19at 08:51; Admin


Dose 40 MG;  Start 6/9/19 at 09:00


Tamsulosin HCl (Flomax) 0.4 mg DAILY PO  Last administered on 6/14/19at 08:51; 


Admin Dose 0.4 MG;  Start 6/9/19 at 09:00


Venlafaxine HCl (Effexor) 50 mg DAILY PO  Last administered on 6/14/19at 08:51; 


Admin Dose 50 MG;  Start 6/9/19 at 09:00


Miscellaneous Information 1 ea NOTE XX ;  Start 6/8/19 at 20:30


Glucose (Glutose) 15 gm Q15M  PRN PO DECREASED GLUCOSE;  Start 6/8/19 at 20:30


Glucose (Glutose) 22.5 gm Q15M  PRN PO DECREASED GLUCOSE;  Start 6/8/19 at 20:30


Dextrose (D50w Syringe) 25 ml Q15M  PRN IV DECREASED GLUCOSE;  Start 6/8/19 at 


20:30


Dextrose (D50w Syringe) 50 ml Q15M  PRN IV DECREASED GLUCOSE;  Start 6/8/19 at 


20:30


Glucagon (Glucagen) 1 mg Q15M  PRN IM DECREASED GLUCOSE;  Start 6/8/19 at 20:30


Glucose (Glutose) 15 gm Q15M  PRN BUCCAL DECREASED GLUCOSE;  Start 6/8/19 at 


20:30


Budesonide (Pulmicort (Neb)) 0.5 mg BID RESP  THERAPY HHN  Last administered on 


6/14/19at 08:36; Admin Dose 0.5 MG;  Start 6/9/19 at 09:30


Albuterol/ Ipratropium (Duoneb) 3 ml Q6HWA RESP  THERAPY HHN  Last administered 


on 6/14/19at 08:36; Admin Dose 3 ML;  Start 6/9/19 at 14:00


Insulin Aspart (Novolog Insulin Pen) NOVOLOG *MODERATE* ALGORITHM WITH MEALS  


BEDTIME SC  Last administered on 6/14/19at 08:09; Admin Dose 6 UNIT;  Start 


6/9/19 at 21:00


Insulin Aspart (Novolog Insulin Pen) 15 unit WITH  MEALS SC  Last administered 


on 6/14/19at 11:30; Admin Dose 15 UNIT;  Start 6/10/19 at 11:50


Insulin Glargine (Lantus) 30 units DAILY@2000 SC  Last administered on 6/13/19at


20:18; Admin Dose 30 UNITS;  Start 6/10/19 at 20:00


Alprazolam (Xanax) 1 mg Q8H  PRN PO ANXIETY Last administered on 6/12/19at 


01:34; Admin Dose 1 MG;  Start 6/10/19 at 15:30


Methylprednisolone Sodium Succinate (Solu-Medrol) 60 mg Q6 IV  Last administered


on 6/14/19at 05:34; Admin Dose 60 MG;  Start 6/11/19 at 12:00


Linagliptin (Tradjenta) 5 mg DAILY PO  Last administered on 6/14/19at 08:51; 


Admin Dose 5 MG;  Start 6/11/19 at 13:00


Metoprolol Succinate (Toprol Xl) 100 mg DAILY PO  Last administered on 6/14/19at


08:50; Admin Dose 100 MG;  Start 6/14/19 at 09:00











PARKER FIORE                    Jun 14, 2019 11:53

## 2019-06-15 VITALS — HEART RATE: 66 BPM

## 2019-06-15 VITALS — HEART RATE: 72 BPM | RESPIRATION RATE: 20 BRPM | SYSTOLIC BLOOD PRESSURE: 131 MMHG | DIASTOLIC BLOOD PRESSURE: 78 MMHG

## 2019-06-15 VITALS — RESPIRATION RATE: 19 BRPM | SYSTOLIC BLOOD PRESSURE: 126 MMHG | DIASTOLIC BLOOD PRESSURE: 92 MMHG | HEART RATE: 61 BPM

## 2019-06-15 VITALS — HEART RATE: 89 BPM | RESPIRATION RATE: 20 BRPM | DIASTOLIC BLOOD PRESSURE: 95 MMHG | SYSTOLIC BLOOD PRESSURE: 122 MMHG

## 2019-06-15 VITALS — HEART RATE: 60 BPM | DIASTOLIC BLOOD PRESSURE: 80 MMHG | SYSTOLIC BLOOD PRESSURE: 140 MMHG | RESPIRATION RATE: 20 BRPM

## 2019-06-15 VITALS — HEART RATE: 71 BPM

## 2019-06-15 VITALS — HEART RATE: 82 BPM

## 2019-06-15 VITALS — HEART RATE: 72 BPM

## 2019-06-15 VITALS — HEART RATE: 84 BPM

## 2019-06-15 RX ADMIN — TAMSULOSIN HYDROCHLORIDE 1 MG: 0.4 CAPSULE ORAL at 08:08

## 2019-06-15 RX ADMIN — ENOXAPARIN SODIUM SCH MG: 100 INJECTION SUBCUTANEOUS at 08:20

## 2019-06-15 RX ADMIN — LINAGLIPTIN 1 MG: 5 TABLET, FILM COATED ORAL at 08:09

## 2019-06-15 RX ADMIN — INSULIN ASPART 1 UNIT: 100 INJECTION, SOLUTION INTRAVENOUS; SUBCUTANEOUS at 08:19

## 2019-06-15 RX ADMIN — BUDESONIDE SCH MG: 0.5 SUSPENSION RESPIRATORY (INHALATION) at 08:17

## 2019-06-15 RX ADMIN — METOPROLOL SUCCINATE SCH MG: 50 TABLET, EXTENDED RELEASE ORAL at 08:10

## 2019-06-15 RX ADMIN — INSULIN ASPART 1 UNIT: 100 INJECTION, SOLUTION INTRAVENOUS; SUBCUTANEOUS at 12:24

## 2019-06-15 RX ADMIN — INSULIN ASPART 1 UNIT: 100 INJECTION, SOLUTION INTRAVENOUS; SUBCUTANEOUS at 17:26

## 2019-06-15 RX ADMIN — VENLAFAXINE SCH MG: 25 TABLET ORAL at 08:09

## 2019-06-15 RX ADMIN — IPRATROPIUM BROMIDE AND ALBUTEROL SULFATE SCH ML: .5; 3 SOLUTION RESPIRATORY (INHALATION) at 08:16

## 2019-06-15 RX ADMIN — TAMSULOSIN HYDROCHLORIDE SCH MG: 0.4 CAPSULE ORAL at 08:08

## 2019-06-15 RX ADMIN — INSULIN ASPART 1 UNIT: 100 INJECTION, SOLUTION INTRAVENOUS; SUBCUTANEOUS at 12:22

## 2019-06-15 RX ADMIN — LINAGLIPTIN SCH MG: 5 TABLET, FILM COATED ORAL at 08:09

## 2019-06-15 RX ADMIN — METOPROLOL SUCCINATE 1 MG: 50 TABLET, EXTENDED RELEASE ORAL at 08:10

## 2019-06-15 RX ADMIN — INSULIN ASPART 1 UNIT: 100 INJECTION, SOLUTION INTRAVENOUS; SUBCUTANEOUS at 08:20

## 2019-06-15 RX ADMIN — IPRATROPIUM BROMIDE AND ALBUTEROL SULFATE SCH ML: .5; 3 SOLUTION RESPIRATORY (INHALATION) at 13:42

## 2019-06-15 RX ADMIN — LISINOPRIL 1 MG: 20 TABLET ORAL at 08:10

## 2019-06-15 RX ADMIN — ALPRAZOLAM 1 MG: 1 TABLET ORAL at 04:49

## 2019-06-15 RX ADMIN — INSULIN ASPART 1 UNIT: 100 INJECTION, SOLUTION INTRAVENOUS; SUBCUTANEOUS at 17:25

## 2019-06-15 RX ADMIN — BUDESONIDE 1 MG: 0.5 SUSPENSION RESPIRATORY (INHALATION) at 08:17

## 2019-06-15 RX ADMIN — ENOXAPARIN SODIUM 1 MG: 100 INJECTION SUBCUTANEOUS at 08:20

## 2019-06-15 RX ADMIN — METHYLPREDNISOLONE SODIUM SUCCINATE 1 MG: 125 INJECTION, POWDER, FOR SOLUTION INTRAMUSCULAR; INTRAVENOUS at 06:05

## 2019-06-15 RX ADMIN — ALPRAZOLAM PRN MG: 1 TABLET ORAL at 04:49

## 2019-06-15 RX ADMIN — IPRATROPIUM BROMIDE AND ALBUTEROL SULFATE 1 ML: .5; 3 SOLUTION RESPIRATORY (INHALATION) at 08:16

## 2019-06-15 RX ADMIN — METHYLPREDNISOLONE SODIUM SUCCINATE 1 MG: 125 INJECTION, POWDER, FOR SOLUTION INTRAMUSCULAR; INTRAVENOUS at 11:58

## 2019-06-15 RX ADMIN — IPRATROPIUM BROMIDE AND ALBUTEROL SULFATE 1 ML: .5; 3 SOLUTION RESPIRATORY (INHALATION) at 13:42

## 2019-06-15 RX ADMIN — METHYLPREDNISOLONE SODIUM SUCCINATE 1 MG: 125 INJECTION, POWDER, FOR SOLUTION INTRAMUSCULAR; INTRAVENOUS at 00:11

## 2019-06-15 RX ADMIN — VENLAFAXINE HYDROCHLORIDE 1 MG: 25 TABLET ORAL at 08:09

## 2019-06-15 RX ADMIN — LISINOPRIL SCH MG: 20 TABLET ORAL at 08:10

## 2019-06-15 NOTE — PDOCDIS
Discharge Instructions


DIAGNOSIS


Discharge Diagnosis


Asthma exacerbation





CONDITION


                [id number]
Patient Condition:  [id number]
Stable








FOLLOW UP/APPOINTMENTS


Follow-up Plan


Make an appointment to see your doctor.  You should see a pulmonologist to 


manage you asthma long term.  You can make an appointment with Dr Edwards at 


office number [phone]





Take your medications everyday as prescribed


Return to the emergency room if you are having any trouble breathing











ML MIRELES MD          Domo 15, 2019 11:49

## 2019-06-15 NOTE — CONS
Consult Date/Type/Reason


Admit Date/Time


Jun 8, 2019 at 17:27


Initial Consult Date


6/9/19


Type of Consultation:  Pulm


Date/Time of Note


DATE: 6/15/19 


TIME: 16:39





Subjective


Asleep at time of visit. No events per RN.





Objective


Vitals





Vital Signs


  Date      Temp  Pulse  Resp  B/P (MAP)   Pulse Ox  O2          O2 Flow    FiO2


Time                                                 Delivery    Rate


   6/15/19           71


     16:03


   6/15/19  97.8           20      131/78        94  Room Air


     15:05                           (95)


   6/15/19                                                             2.0


     14:06


   6/14/19                                                                    30


     23:35








Intake and Output





6/14/19


6/14/19


6/15/19





1515:00


23:00


07:00





IntakeIntake Total


900 ml


700 ml





BalanceBalance


900 ml


700 ml











Exam


HEENT: Neck supple; no JVD; no LAD


CVS: Irreg irreg S1 and S2


CHEST: Occ wheezing 


ABD: Soft, NT, + BS


EXT: No c/c/e





Results/Medications


Results 24 hrs





Laboratory Tests


  Test
            6/14/19
17:12  6/14/19
20:05  6/15/19
08:06  6/15/19
11:58


  Bedside Glucose          182           221  H         313  H          199





Home Meds


Reported Medications


Levalbuterol* (Xopenex* HFA) 15 Gm Inha, 2 PUFFS INH Q4H PRN for WHEEZING AND SO


B, INHALER


   6/8/19


Atorvastatin Calcium* (Atorvastatin Calcium*) 20 Mg Tablet, 20 MG PO QHS, #30 


TAB


   6/8/19


Furosemide* (Furosemide*) 20 Mg Tablet, 20 MG PO DAILY, #60 TAB


   6/8/19


Venlafaxine Hcl* (Venlafaxine Hcl*) 50 Mg Tablet, 50 MG PO DAILY, TAB


   6/8/19


Sitagliptin* (Januvia*) 100 Mg Tablet, 100 MG PO DAILY, #30 TAB


   6/8/19


Lisinopril* (Lisinopril*) 40 Mg Tablet, 40 MG PO DAILY, #30 TAB


   6/8/19


Tamsulosin Hcl* (Flomax*) 0.4 Mg Cap.er.24h, 0.4 MG PO DAILY, CAP


   6/8/19


Medications





Current Medications


IV Flush (NS 3 ml) 3 ml PER PROTOCOL IV ;  Start 6/8/19 at 20:00


Ondansetron HCl (Zofran Inj) 4 mg Q6H  PRN IV NAUSEA/VOMITING;  Start 6/8/19 at 


20:00


Acetaminophen (Tylenol Tab) 650 mg Q6H  PRN PO .PAIN 1-3 OR TEMP;  Start 6/8/19 


at 20:00


Acetaminophen/ Hydrocodone Bitart (Norco (5/325)) 1 tab Q6H  PRN PO .MOD PAIN 4-


6 Last administered on 6/12/19at 20:06; Admin Dose 1 TAB;  Start 6/8/19 at 20:00


Morphine Sulfate (morphine) 2 mg Q4H  PRN IV .SEVERE PAIN 7-10 Last administered


on 6/13/19at 01:55; Admin Dose 2 MG;  Start 6/8/19 at 20:00


Docusate Sodium (Colace) 100 mg Q12H  PRN PO .CONSTIPATION;  Start 6/8/19 at 


20:00


Zolpidem Tartrate (Ambien) 5 mg QHS  PRN PO .INSOMNIA Last administered on 


6/14/19at 21:57; Admin Dose 5 MG;  Start 6/8/19 at 20:00


Enoxaparin Sodium (Lovenox) 40 mg DAILY SC  Last administered on 6/15/19at 


08:20; Admin Dose 40 MG;  Start 6/9/19 at 09:00


Albuterol/ Ipratropium (Duoneb) 3 ml Q4H RESP THERAPY  PRN HHN SHORTNESS OF 


BREATH Last administered on 6/13/19at 23:19; Admin Dose 3 ML;  Start 6/8/19 at 


20:00


Atorvastatin Calcium (Lipitor) 20 mg QHS PO  Last administered on 6/14/19at 


20:06; Admin Dose 20 MG;  Start 6/8/19 at 21:00


Lisinopril (Zestril) 40 mg DAILY PO  Last administered on 6/15/19at 08:10; Admin


Dose 40 MG;  Start 6/9/19 at 09:00


Tamsulosin HCl (Flomax) 0.4 mg DAILY PO  Last administered on 6/15/19at 08:08; 


Admin Dose 0.4 MG;  Start 6/9/19 at 09:00


Venlafaxine HCl (Effexor) 50 mg DAILY PO  Last administered on 6/15/19at 08:09; 


Admin Dose 50 MG;  Start 6/9/19 at 09:00


Miscellaneous Information 1 ea NOTE XX ;  Start 6/8/19 at 20:30


Glucose (Glutose) 15 gm Q15M  PRN PO DECREASED GLUCOSE;  Start 6/8/19 at 20:30


Glucose (Glutose) 22.5 gm Q15M  PRN PO DECREASED GLUCOSE;  Start 6/8/19 at 20:30


Dextrose (D50w Syringe) 25 ml Q15M  PRN IV DECREASED GLUCOSE;  Start 6/8/19 at 


20:30


Dextrose (D50w Syringe) 50 ml Q15M  PRN IV DECREASED GLUCOSE;  Start 6/8/19 at 


20:30


Glucagon (Glucagen) 1 mg Q15M  PRN IM DECREASED GLUCOSE;  Start 6/8/19 at 20:30


Glucose (Glutose) 15 gm Q15M  PRN BUCCAL DECREASED GLUCOSE;  Start 6/8/19 at 


20:30


Budesonide (Pulmicort (Neb)) 0.5 mg BID RESP  THERAPY HHN  Last administered on 


6/15/19at 08:17; Admin Dose 0.5 MG;  Start 6/9/19 at 09:30


Albuterol/ Ipratropium (Duoneb) 3 ml Q6HWA RESP  THERAPY HHN  Last administered 


on 6/15/19at 13:42; Admin Dose 3 ML;  Start 6/9/19 at 14:00


Insulin Aspart (Novolog Insulin Pen) NOVOLOG *MODERATE* ALGORITHM WITH MEALS  


BEDTIME SC  Last administered on 6/15/19at 12:22; Admin Dose 4 UNIT;  Start 


6/9/19 at 21:00


Insulin Aspart (Novolog Insulin Pen) 15 unit WITH  MEALS SC  Last administered 


on 6/15/19at 12:24; Admin Dose 15 UNIT;  Start 6/10/19 at 11:50


Insulin Glargine (Lantus) 30 units DAILY@2000 SC  Last administered on 6/14/19at


20:14; Admin Dose 30 UNITS;  Start 6/10/19 at 20:00


Alprazolam (Xanax) 1 mg Q8H  PRN PO ANXIETY Last administered on 6/15/19at 


04:49; Admin Dose 1 MG;  Start 6/10/19 at 15:30


Methylprednisolone Sodium Succinate (Solu-Medrol) 60 mg Q6 IV  Last administered


on 6/15/19at 11:58; Admin Dose 60 MG;  Start 6/11/19 at 12:00


Linagliptin (Tradjenta) 5 mg DAILY PO  Last administered on 6/15/19at 08:09; 


Admin Dose 5 MG;  Start 6/11/19 at 13:00


Metoprolol Succinate (Toprol Xl) 100 mg DAILY PO  Last administered on 6/15/19at


08:10; Admin Dose 100 MG;  Start 6/14/19 at 09:00





Assessment/Plan


Assessment/Plan (Daily)


IMP:


1. Asthma Exacerbation 


2. JOSEPH 


3. Leukocytosis 2/2 CS





RECS:


1. Taper CS


2. Continue BDs and azithromycin











NOHEMY LIANG MD              Domo 15, 2019 16:42

## 2019-06-15 NOTE — DS
Date/Time of Note


Date/Time of Note


DATE: 6/15/19 


TIME: 13:21





Discharge Summary


Admission/Discharge Info


Admit Date/Time


Jun 8, 2019 at 17:27


Discharge Date/Time





Discharge Diagnosis


Asthma exacerbation


Patient Condition:  Stable


Hospital Course


The patient was found to be in acute asthma exacerbation.  He was treated with 


bronchodilators and steroids and his respiratory status very slowly improved.  


He did have an episode of atrial fibrillation requring IV beta blockers.  He is 


aware of atrial fibrillation and has discussed this with his doctor before.  He 


will discuss anticoagulation as an outpatient.  His respiratory status was not 


quite back to baseline but he very much wanted to be discharged today.  I gave 


him a few more days of prednisone as well as symbicort and rescue inhaler


Home Meds


Reported Medications


Levalbuterol* (Xopenex* HFA) 15 Gm Inha, 2 PUFFS INH Q4H PRN for WHEEZING AND 


SOB, INHALER


   6/8/19


Atorvastatin Calcium* (Atorvastatin Calcium*) 20 Mg Tablet, 20 MG PO QHS, #30 


TAB


   6/8/19


Furosemide* (Furosemide*) 20 Mg Tablet, 20 MG PO DAILY, #60 TAB


   6/8/19


Venlafaxine Hcl* (Venlafaxine Hcl*) 50 Mg Tablet, 50 MG PO DAILY, TAB


   6/8/19


Sitagliptin* (Januvia*) 100 Mg Tablet, 100 MG PO DAILY, #30 TAB


   6/8/19


Lisinopril* (Lisinopril*) 40 Mg Tablet, 40 MG PO DAILY, #30 TAB


   6/8/19


Tamsulosin Hcl* (Flomax*) 0.4 Mg Cap.er.24h, 0.4 MG PO DAILY, CAP


   6/8/19


Follow-up Plan


Make an appointment to see your doctor.  You should see a pulmonologist to 


manage you asthma long term.  You can make an appointment with Dr Edwards at 


office number [phone]





Take your medications everyday as prescribed


Return to the emergency room if you are having any trouble breathing


Primary Care Provider


Not On Staff Doctor


Pending Labs





Laboratory Tests


Test
              6/14/19
17:12   6/14/19
20:05   6/15/19
08:06   6/15/19
11:58


Bedside                      182             221             313             199


Glucose
          mg/dL
()  mg/dL
()














ML MIRELES MD          Domo 15, 2019 13:23

## 2020-07-27 ENCOUNTER — HOSPITAL ENCOUNTER (INPATIENT)
Dept: HOSPITAL 54 - ER | Age: 70
LOS: 10 days | Discharge: HOME HEALTH | DRG: 423 | End: 2020-08-06
Attending: INTERNAL MEDICINE | Admitting: INTERNAL MEDICINE
Payer: MEDICARE

## 2020-07-27 VITALS — SYSTOLIC BLOOD PRESSURE: 144 MMHG | DIASTOLIC BLOOD PRESSURE: 98 MMHG

## 2020-07-27 VITALS — HEIGHT: 67 IN | BODY MASS INDEX: 31.71 KG/M2 | WEIGHT: 202 LBS

## 2020-07-27 VITALS — DIASTOLIC BLOOD PRESSURE: 92 MMHG | SYSTOLIC BLOOD PRESSURE: 128 MMHG

## 2020-07-27 DIAGNOSIS — Z88.2: ICD-10-CM

## 2020-07-27 DIAGNOSIS — E87.6: ICD-10-CM

## 2020-07-27 DIAGNOSIS — Z86.73: ICD-10-CM

## 2020-07-27 DIAGNOSIS — L97.528: ICD-10-CM

## 2020-07-27 DIAGNOSIS — F20.9: ICD-10-CM

## 2020-07-27 DIAGNOSIS — M65.861: ICD-10-CM

## 2020-07-27 DIAGNOSIS — E11.621: ICD-10-CM

## 2020-07-27 DIAGNOSIS — E78.5: ICD-10-CM

## 2020-07-27 DIAGNOSIS — E83.42: ICD-10-CM

## 2020-07-27 DIAGNOSIS — L84: ICD-10-CM

## 2020-07-27 DIAGNOSIS — E83.39: ICD-10-CM

## 2020-07-27 DIAGNOSIS — E11.22: ICD-10-CM

## 2020-07-27 DIAGNOSIS — N18.9: ICD-10-CM

## 2020-07-27 DIAGNOSIS — I12.9: ICD-10-CM

## 2020-07-27 DIAGNOSIS — E11.40: ICD-10-CM

## 2020-07-27 DIAGNOSIS — K76.6: ICD-10-CM

## 2020-07-27 DIAGNOSIS — L03.115: ICD-10-CM

## 2020-07-27 DIAGNOSIS — F29: ICD-10-CM

## 2020-07-27 DIAGNOSIS — M25.461: ICD-10-CM

## 2020-07-27 DIAGNOSIS — Z96.652: ICD-10-CM

## 2020-07-27 DIAGNOSIS — F15.90: ICD-10-CM

## 2020-07-27 DIAGNOSIS — K74.60: Primary | ICD-10-CM

## 2020-07-27 DIAGNOSIS — N17.0: ICD-10-CM

## 2020-07-27 DIAGNOSIS — G92: ICD-10-CM

## 2020-07-27 DIAGNOSIS — M00.861: ICD-10-CM

## 2020-07-27 DIAGNOSIS — N40.0: ICD-10-CM

## 2020-07-27 DIAGNOSIS — F05: ICD-10-CM

## 2020-07-27 LAB
ALBUMIN SERPL BCP-MCNC: 4 G/DL (ref 3.4–5)
ALP SERPL-CCNC: 115 U/L (ref 46–116)
ALT SERPL W P-5'-P-CCNC: 21 U/L (ref 12–78)
AMMONIA PLAS-SCNC: 16 UMOL/L (ref 11–32)
APAP SERPL-MCNC: < 2 UG/ML (ref 10–30)
AST SERPL W P-5'-P-CCNC: 33 U/L (ref 15–37)
BASOPHILS # BLD AUTO: 0.1 /CMM (ref 0–0.2)
BASOPHILS NFR BLD AUTO: 0.4 % (ref 0–2)
BILIRUB DIRECT SERPL-MCNC: 0.4 MG/DL (ref 0–0.2)
BILIRUB SERPL-MCNC: 1.9 MG/DL (ref 0.2–1)
BUN SERPL-MCNC: 27 MG/DL (ref 7–18)
CALCIUM SERPL-MCNC: 8.7 MG/DL (ref 8.5–10.1)
CHLORIDE SERPL-SCNC: 104 MMOL/L (ref 98–107)
CK SERPL-CCNC: 315 U/L (ref 39–308)
CO2 SERPL-SCNC: 26 MMOL/L (ref 21–32)
CREAT SERPL-MCNC: 1.4 MG/DL (ref 0.6–1.3)
EOSINOPHIL NFR BLD AUTO: 1.7 % (ref 0–6)
ETHANOL SERPL-MCNC: < 3 MG/DL (ref 0–0)
GLUCOSE SERPL-MCNC: 177 MG/DL (ref 74–106)
HCT VFR BLD AUTO: 50 % (ref 39–51)
HGB BLD-MCNC: 16.6 G/DL (ref 13.5–17.5)
LYMPHOCYTES NFR BLD AUTO: 1.7 /CMM (ref 0.8–4.8)
LYMPHOCYTES NFR BLD AUTO: 10 % (ref 20–44)
MAGNESIUM SERPL-MCNC: 1.7 MG/DL (ref 1.8–2.4)
MCHC RBC AUTO-ENTMCNC: 33 G/DL (ref 31–36)
MCV RBC AUTO: 86 FL (ref 80–96)
MONOCYTES NFR BLD AUTO: 1.4 /CMM (ref 0.1–1.3)
MONOCYTES NFR BLD AUTO: 8 % (ref 2–12)
NEUTROPHILS # BLD AUTO: 13.6 /CMM (ref 1.8–8.9)
NEUTROPHILS NFR BLD AUTO: 79.9 % (ref 43–81)
PLATELET # BLD AUTO: 286 /CMM (ref 150–450)
POTASSIUM SERPL-SCNC: 3.3 MMOL/L (ref 3.5–5.1)
PROT SERPL-MCNC: 7.5 G/DL (ref 6.4–8.2)
RBC # BLD AUTO: 5.78 MIL/UL (ref 4.5–6)
SALICYLATES SERPL-MCNC: < 2.8 MG/DL (ref 2.8–20)
SODIUM SERPL-SCNC: 141 MMOL/L (ref 136–145)
WBC NRBC COR # BLD AUTO: 17 K/UL (ref 4.3–11)

## 2020-07-27 PROCEDURE — A6253 ABSORPT DRG > 48 SQ IN W/O B: HCPCS

## 2020-07-27 PROCEDURE — G0480 DRUG TEST DEF 1-7 CLASSES: HCPCS

## 2020-07-27 PROCEDURE — A4217 STERILE WATER/SALINE, 500 ML: HCPCS

## 2020-07-27 PROCEDURE — G0378 HOSPITAL OBSERVATION PER HR: HCPCS

## 2020-07-27 PROCEDURE — L1830 KO IMMOB CANVAS LONG PRE OTS: HCPCS

## 2020-07-27 PROCEDURE — C1751 CATH, INF, PER/CENT/MIDLINE: HCPCS

## 2020-07-27 PROCEDURE — A6403 STERILE GAUZE>16 <= 48 SQ IN: HCPCS

## 2020-07-27 RX ADMIN — LORAZEPAM PRN MG: 2 INJECTION INTRAMUSCULAR; INTRAVENOUS at 20:55

## 2020-07-27 RX ADMIN — TAMSULOSIN HYDROCHLORIDE SCH MG: 0.4 CAPSULE ORAL at 22:23

## 2020-07-27 RX ADMIN — FOLIC ACID SCH MG: 1 TABLET ORAL at 20:55

## 2020-07-27 RX ADMIN — ATORVASTATIN CALCIUM SCH MG: 10 TABLET, FILM COATED ORAL at 22:23

## 2020-07-27 RX ADMIN — Medication SCH EACH: at 22:42

## 2020-07-27 RX ADMIN — Medication SCH MG: at 20:55

## 2020-07-27 RX ADMIN — DEXTROSE AND SODIUM CHLORIDE PRN MLS/HR: 5; 450 INJECTION, SOLUTION INTRAVENOUS at 23:23

## 2020-07-27 NOTE — NUR
PT TRANSPORTED TO UNIT ON [facility] WITH EMT AND RN AT BEDSIDED W/ ACLS PROTOCOL. 
NAD NOTED DURING TRANSPORT. PT AMBULATED W/ ASSSIST FROM [facility]NEY TO BED.

## 2020-07-27 NOTE — NUR
RN OPENING NOTES:



Received pt resting in bed, A&ox1-2. On isolation precautions for Covid, pending results. On 
room air, O2 sat WNL. No SOB or respiratory distress noted. SR/ST on tele monitor. IV site 
on RAC #18 and NAS #20 patent and flushing with bolus of NS infusing currently. Tolerating 
well. Dressings c/d/i. Will do head to toe assessment and skin check. Safety measures in 
place. Will continue to monitor.

## 2020-07-27 NOTE — NUR
FRANCOISE FROM A [facility]IVING. TO ER BED 12. AAOX1. NOT IN RESP DISTRESS, BREATHING 
EVEN AND UNLABORED. AMBULATORY. BROUGHT IN FOR ALTRERED MENTAL STATUS.PER EMS 
REPORT, PT IS BASELINE AAOX3. PT IS NOTED JITTERY, TWITCHING AND CANT STAY 
STILL. PT IS VERBALLY RESPONSIVE, COMPLIANT AND COOPERATIVE. PT IS C/O MID 
LOWER BACK PAIN. PT DENIES ANY DRUG USE. AWAITING MD FOR EVAL.

## 2020-07-27 NOTE — NUR
RN NOTE:



Pt attempting to climb out of bed, removing gown, removing NC and agitated after receiving 
Ativan and Ambien PRNs. Paged Merle, on call and received orders for restraints. Restraints 
placed. Will continue to monitor.

## 2020-07-27 NOTE — NUR
2035 KENJI HANKINS WAS NOTIFIED OF PATIENT'S FREQUENT PVCS WITH ORDER TO GIVE KCL 40 MEQ PO X 
1.  ORDER NOTED AND CARRIED OUT.

## 2020-07-27 NOTE — NUR
RN NOTES 

RECEIVED PT FROM ER IN ROOM 105, PT IS A/Ox1, CONFUSED , RESTLESS AND JITTERY . UNABLE TO 
STAY STILL , ON RA, O2 SAT WNL, EXPIRATORY WHEEZING NOTED, ON TELE SR HR IN 90'S, NO SKIN 
PROBLEM  NOTED,  R AC IV SITE G22 CLEAN, DRY AND INTACT, SR UP x3, CALL LIGHT WITHIN EASY 
REACH, BED LOCKED AND IN LOWEST POSITION, WILL ENDOSE TO NIGHT SHIFT NURSE FOR CONTINUITY OF 
CARE .

## 2020-07-27 NOTE — NUR
RN NOTE:



In pt's room to administer stat KCl order, pt stating, "Let me touch you" while reaching out 
to touch me. Explained that is not appropriate. He proceeded to ask "Can you stroke it a 
little? You know if you touch it it will get hard." Explained again that this is not 
appropriate behavior. Will continue to monitor.

## 2020-07-27 NOTE — NUR
RN NOTE:



Pt continues to move arm and kink line where IV bolus is infusing. Started a new line on LH 
#20 patent and flushed.

## 2020-07-27 NOTE — NUR
UNABLE TO OBTAINED HEAD CT AND EKG D/T PT KEEPS MOVING ARROUND AND CANT STAY 
STILL. MD MADE AWARE AND VERBAL ORDER RECIEVED TO GIVE ATIVAN 1MG IV X 1 DOSE. 
NOTED AND WILL CARRY OUT

## 2020-07-28 VITALS — SYSTOLIC BLOOD PRESSURE: 121 MMHG | DIASTOLIC BLOOD PRESSURE: 68 MMHG

## 2020-07-28 VITALS — SYSTOLIC BLOOD PRESSURE: 145 MMHG | DIASTOLIC BLOOD PRESSURE: 77 MMHG

## 2020-07-28 VITALS — SYSTOLIC BLOOD PRESSURE: 106 MMHG | DIASTOLIC BLOOD PRESSURE: 60 MMHG

## 2020-07-28 VITALS — DIASTOLIC BLOOD PRESSURE: 73 MMHG | SYSTOLIC BLOOD PRESSURE: 136 MMHG

## 2020-07-28 VITALS — SYSTOLIC BLOOD PRESSURE: 131 MMHG | DIASTOLIC BLOOD PRESSURE: 85 MMHG

## 2020-07-28 VITALS — SYSTOLIC BLOOD PRESSURE: 116 MMHG | DIASTOLIC BLOOD PRESSURE: 59 MMHG

## 2020-07-28 LAB
BASOPHILS # BLD AUTO: 0.1 /CMM (ref 0–0.2)
BASOPHILS NFR BLD AUTO: 0.5 % (ref 0–2)
BUN SERPL-MCNC: 22 MG/DL (ref 7–18)
CALCIUM SERPL-MCNC: 7.9 MG/DL (ref 8.5–10.1)
CHLORIDE SERPL-SCNC: 108 MMOL/L (ref 98–107)
CHOLEST SERPL-MCNC: 93 MG/DL (ref ?–200)
CO2 SERPL-SCNC: 23 MMOL/L (ref 21–32)
CREAT SERPL-MCNC: 1.2 MG/DL (ref 0.6–1.3)
EOSINOPHIL NFR BLD AUTO: 0.7 % (ref 0–6)
GLUCOSE SERPL-MCNC: 110 MG/DL (ref 74–106)
HCT VFR BLD AUTO: 43 % (ref 39–51)
HDLC SERPL-MCNC: 37 MG/DL (ref 40–60)
HGB BLD-MCNC: 14 G/DL (ref 13.5–17.5)
LDLC SERPL DIRECT ASSAY-MCNC: 53 MG/DL (ref 0–99)
LYMPHOCYTES NFR BLD AUTO: 1.2 /CMM (ref 0.8–4.8)
LYMPHOCYTES NFR BLD AUTO: 9.7 % (ref 20–44)
MAGNESIUM SERPL-MCNC: 1.9 MG/DL (ref 1.8–2.4)
MCHC RBC AUTO-ENTMCNC: 33 G/DL (ref 31–36)
MCV RBC AUTO: 86 FL (ref 80–96)
MONOCYTES NFR BLD AUTO: 1 /CMM (ref 0.1–1.3)
MONOCYTES NFR BLD AUTO: 8 % (ref 2–12)
NEUTROPHILS # BLD AUTO: 10 /CMM (ref 1.8–8.9)
NEUTROPHILS NFR BLD AUTO: 81.1 % (ref 43–81)
PHOSPHATE SERPL-MCNC: 2.8 MG/DL (ref 2.5–4.9)
PLATELET # BLD AUTO: 199 /CMM (ref 150–450)
POTASSIUM SERPL-SCNC: 3.8 MMOL/L (ref 3.5–5.1)
RBC # BLD AUTO: 4.97 MIL/UL (ref 4.5–6)
SODIUM SERPL-SCNC: 142 MMOL/L (ref 136–145)
TRIGL SERPL-MCNC: 75 MG/DL (ref 30–150)
TSH SERPL DL<=0.005 MIU/L-ACNC: 0.64 UIU/ML (ref 0.36–3.74)
WBC NRBC COR # BLD AUTO: 12.3 K/UL (ref 4.3–11)

## 2020-07-28 RX ADMIN — DEXTROSE AND SODIUM CHLORIDE PRN MLS/HR: 5; 450 INJECTION, SOLUTION INTRAVENOUS at 12:30

## 2020-07-28 RX ADMIN — FERROUS SULFATE TAB 325 MG (65 MG ELEMENTAL FE) SCH MG: 325 (65 FE) TAB at 08:35

## 2020-07-28 RX ADMIN — INSULIN HUMAN PRN UNIT: 100 INJECTION, SOLUTION PARENTERAL at 12:47

## 2020-07-28 RX ADMIN — LORAZEPAM PRN MG: 2 INJECTION INTRAMUSCULAR; INTRAVENOUS at 01:20

## 2020-07-28 RX ADMIN — ACETAMINOPHEN PRN MG: 325 TABLET ORAL at 20:19

## 2020-07-28 RX ADMIN — ATORVASTATIN CALCIUM SCH MG: 10 TABLET, FILM COATED ORAL at 21:29

## 2020-07-28 RX ADMIN — Medication SCH EACH: at 17:27

## 2020-07-28 RX ADMIN — Medication SCH EACH: at 12:43

## 2020-07-28 RX ADMIN — Medication SCH EACH: at 08:10

## 2020-07-28 RX ADMIN — LORAZEPAM PRN MG: 2 INJECTION INTRAMUSCULAR; INTRAVENOUS at 10:07

## 2020-07-28 RX ADMIN — Medication PRN EACH: at 21:29

## 2020-07-28 RX ADMIN — INSULIN HUMAN PRN UNIT: 100 INJECTION, SOLUTION PARENTERAL at 08:46

## 2020-07-28 RX ADMIN — Medication SCH EACH: at 21:36

## 2020-07-28 RX ADMIN — Medication SCH MG: at 08:35

## 2020-07-28 RX ADMIN — FOLIC ACID SCH MG: 1 TABLET ORAL at 09:13

## 2020-07-28 RX ADMIN — PANTOPRAZOLE SODIUM SCH MG: 40 TABLET, DELAYED RELEASE ORAL at 08:11

## 2020-07-28 RX ADMIN — LISINOPRIL SCH MG: 20 TABLET ORAL at 08:37

## 2020-07-28 RX ADMIN — INSULIN HUMAN PRN UNIT: 100 INJECTION, SOLUTION PARENTERAL at 21:38

## 2020-07-28 RX ADMIN — TAMSULOSIN HYDROCHLORIDE SCH MG: 0.4 CAPSULE ORAL at 21:30

## 2020-07-28 RX ADMIN — MONTELUKAST SODIUM SCH MG: 10 TABLET, FILM COATED ORAL at 08:35

## 2020-07-28 RX ADMIN — LINAGLIPTIN SCH MG: 5 TABLET, FILM COATED ORAL at 08:35

## 2020-07-28 NOTE — NUR
RN NOTES

RELAY TO YFN MCKINLEY NP ABOUT THE EPISODE OF PVC OF THE PT, WITH NO NEW ORDER BECAUSE DR. WOODRUFF ALREADY AWARE OF THE PVC JUST WAITING FOR COVID TEST TO BE NEG THEN WE WILL DO 
ECHOCARDIOGRAM TO THE PT

## 2020-07-28 NOTE — NUR
JAYANTW conducted chart review and noticed no psych consult order has been placed. LCSW called 
RAMON ASIA Rondon and informed her regarding no psych consult order. ASIA Rondon to f/u with Dr. Whitney to place an order for a psych consult.

## 2020-07-28 NOTE — NUR
RN CLOSING NOTES:



Pt remains on 2LPM NC, tolerating well. Isolation precautions still in place. No SOB or 
respiratory distress noted throughout shift. No acute changes noted throughout shift. SR 
with couplet PVC on tele monitor.  IVF of D5 1/2NS infusing in RH at 75ml/hr tolerating 
well. Kept clean and dry. All meds administered as ordered. Safety measures in place. Will 
endorse to AM nurse for WARREN.

## 2020-07-28 NOTE — NUR
RN OPENING NOTES:

RECEIVED PT IN BED, NO SIGNS OF RESPIRATORY DISTRESS, BREATHING EVEN AND LABORED, PT IS ON 
NC 2L WITH O2 98%. PT IS A/O X 3, ON TELE MONITOR SR-ST. IV R AC #18, R UPPER #20. PTS BED 
IS IN THE LOWEST POSITION, LOCKED SIDE RAILS ARE UP, CALL LIGHT IS WITHIN REACH, PT IS WITH 
THE SITTER. WILL CONTINUE TO MONITOR CLOSELY.

## 2020-07-28 NOTE — NUR
RN  NOTE

ENDORSED BY JUANA GARCIA , RECEIVED PT IN BED, NO SIGNS OF RESPIRATORY DISTRESS, BREATHING EVEN 
AND LABORED, PT IS ON NC 2L WITH O2 98%. PT IS A/O X 3, ON TELE MONITOR SR-ST. IV R AC #18, 
R UPPER #20. PTS BED IS IN THE LOWEST POSITION, LOCKED SIDE RAILS ARE UP, CALL LIGHT IS 
WITHIN REACH, PT IS WITH THE SITTER. WILL CONTINUE TO MONITOR CLOSELY.

## 2020-07-28 NOTE — NUR
RN CLOSING NOTES:

WILL ENDORSED TO PM NURSE FOR WARREN. PATIENT IN BED COMFORTABLE , A/0X 2, PATIENT ON NC 2L 
SATURATING @ 98%, TOLERATING WELL. NO S/S OF RESPIRATORY DISTRESS THROUGHOUT SHIFT. TELE 
MONITOR READING  SR W/ COUPLET PVC. IV ACCESS LEFT HAND # 20 RUNNING 

D5 1/2 NS @  at 75ML/HR TOLERATING WELL. SAFETY MEASURES IN PLACE, BED LOCKED IN LOWEST 
POSITION . CALL LIGHT WITHIN EASY REACH .

## 2020-07-29 VITALS — DIASTOLIC BLOOD PRESSURE: 87 MMHG | SYSTOLIC BLOOD PRESSURE: 126 MMHG

## 2020-07-29 VITALS — DIASTOLIC BLOOD PRESSURE: 71 MMHG | SYSTOLIC BLOOD PRESSURE: 128 MMHG

## 2020-07-29 VITALS — SYSTOLIC BLOOD PRESSURE: 126 MMHG | DIASTOLIC BLOOD PRESSURE: 79 MMHG

## 2020-07-29 VITALS — DIASTOLIC BLOOD PRESSURE: 73 MMHG | SYSTOLIC BLOOD PRESSURE: 121 MMHG

## 2020-07-29 VITALS — SYSTOLIC BLOOD PRESSURE: 122 MMHG | DIASTOLIC BLOOD PRESSURE: 93 MMHG

## 2020-07-29 VITALS — DIASTOLIC BLOOD PRESSURE: 75 MMHG | SYSTOLIC BLOOD PRESSURE: 135 MMHG

## 2020-07-29 LAB
BASOPHILS # BLD AUTO: 0.1 /CMM (ref 0–0.2)
BASOPHILS NFR BLD AUTO: 0.5 % (ref 0–2)
BUN SERPL-MCNC: 22 MG/DL (ref 7–18)
CALCIUM SERPL-MCNC: 8 MG/DL (ref 8.5–10.1)
CHLORIDE SERPL-SCNC: 106 MMOL/L (ref 98–107)
CO2 SERPL-SCNC: 23 MMOL/L (ref 21–32)
CREAT SERPL-MCNC: 1.2 MG/DL (ref 0.6–1.3)
EOSINOPHIL NFR BLD AUTO: 0.7 % (ref 0–6)
EOSINOPHIL NFR BLD MANUAL: 1 % (ref 0–4)
GLUCOSE SERPL-MCNC: 156 MG/DL (ref 74–106)
HCT VFR BLD AUTO: 42 % (ref 39–51)
HGB BLD-MCNC: 13.6 G/DL (ref 13.5–17.5)
LYMPHOCYTES NFR BLD AUTO: 1.5 /CMM (ref 0.8–4.8)
LYMPHOCYTES NFR BLD AUTO: 13.9 % (ref 20–44)
LYMPHOCYTES NFR BLD MANUAL: 17 % (ref 16–48)
MAGNESIUM SERPL-MCNC: 1.7 MG/DL (ref 1.8–2.4)
MCHC RBC AUTO-ENTMCNC: 33 G/DL (ref 31–36)
MCV RBC AUTO: 88 FL (ref 80–96)
MONOCYTES NFR BLD AUTO: 18.6 % (ref 2–12)
MONOCYTES NFR BLD AUTO: 2 /CMM (ref 0.1–1.3)
MONOCYTES NFR BLD MANUAL: 17 % (ref 0–11)
NEUTROPHILS # BLD AUTO: 7.2 /CMM (ref 1.8–8.9)
NEUTROPHILS NFR BLD AUTO: 66.3 % (ref 43–81)
NEUTS SEG NFR BLD MANUAL: 65 % (ref 42–76)
PHOSPHATE SERPL-MCNC: 2.7 MG/DL (ref 2.5–4.9)
PLATELET # BLD AUTO: 183 /CMM (ref 150–450)
POTASSIUM SERPL-SCNC: 3.6 MMOL/L (ref 3.5–5.1)
RBC # BLD AUTO: 4.71 MIL/UL (ref 4.5–6)
SODIUM SERPL-SCNC: 140 MMOL/L (ref 136–145)
WBC NRBC COR # BLD AUTO: 10.9 K/UL (ref 4.3–11)

## 2020-07-29 RX ADMIN — FOLIC ACID SCH MG: 1 TABLET ORAL at 08:22

## 2020-07-29 RX ADMIN — ESCITALOPRAM OXALATE SCH MG: 10 TABLET, FILM COATED ORAL at 11:54

## 2020-07-29 RX ADMIN — Medication PRN EACH: at 03:12

## 2020-07-29 RX ADMIN — MONTELUKAST SODIUM SCH MG: 10 TABLET, FILM COATED ORAL at 08:23

## 2020-07-29 RX ADMIN — TAMSULOSIN HYDROCHLORIDE SCH MG: 0.4 CAPSULE ORAL at 21:47

## 2020-07-29 RX ADMIN — LISINOPRIL SCH MG: 20 TABLET ORAL at 08:48

## 2020-07-29 RX ADMIN — Medication PRN EACH: at 12:23

## 2020-07-29 RX ADMIN — Medication SCH MG: at 08:22

## 2020-07-29 RX ADMIN — INSULIN HUMAN PRN UNIT: 100 INJECTION, SOLUTION PARENTERAL at 16:47

## 2020-07-29 RX ADMIN — Medication SCH EACH: at 16:34

## 2020-07-29 RX ADMIN — HALOPERIDOL SCH MG: 1 TABLET ORAL at 16:34

## 2020-07-29 RX ADMIN — Medication PRN EACH: at 16:45

## 2020-07-29 RX ADMIN — ATORVASTATIN CALCIUM SCH MG: 10 TABLET, FILM COATED ORAL at 21:47

## 2020-07-29 RX ADMIN — TRAZODONE HYDROCHLORIDE SCH MG: 50 TABLET ORAL at 21:47

## 2020-07-29 RX ADMIN — Medication SCH EACH: at 08:21

## 2020-07-29 RX ADMIN — Medication SCH EACH: at 22:54

## 2020-07-29 RX ADMIN — Medication SCH EACH: at 12:15

## 2020-07-29 RX ADMIN — INSULIN HUMAN PRN UNIT: 100 INJECTION, SOLUTION PARENTERAL at 22:01

## 2020-07-29 RX ADMIN — PANTOPRAZOLE SODIUM SCH MG: 40 TABLET, DELAYED RELEASE ORAL at 08:22

## 2020-07-29 RX ADMIN — LORAZEPAM PRN MG: 2 INJECTION INTRAMUSCULAR; INTRAVENOUS at 11:37

## 2020-07-29 RX ADMIN — Medication PRN EACH: at 23:23

## 2020-07-29 RX ADMIN — FERROUS SULFATE TAB 325 MG (65 MG ELEMENTAL FE) SCH MG: 325 (65 FE) TAB at 08:22

## 2020-07-29 RX ADMIN — DEXTROSE AND SODIUM CHLORIDE PRN MLS/HR: 5; 450 INJECTION, SOLUTION INTRAVENOUS at 16:34

## 2020-07-29 RX ADMIN — INSULIN HUMAN PRN UNIT: 100 INJECTION, SOLUTION PARENTERAL at 08:51

## 2020-07-29 RX ADMIN — LINAGLIPTIN SCH MG: 5 TABLET, FILM COATED ORAL at 08:22

## 2020-07-29 RX ADMIN — DEXTROSE AND SODIUM CHLORIDE PRN MLS/HR: 5; 450 INJECTION, SOLUTION INTRAVENOUS at 02:08

## 2020-07-29 RX ADMIN — INSULIN HUMAN PRN UNIT: 100 INJECTION, SOLUTION PARENTERAL at 11:52

## 2020-07-29 RX ADMIN — Medication PRN EACH: at 09:44

## 2020-07-29 NOTE — NUR
RN NOTE



PATIENT IS RESTING COMFORTABLY IN BED. NO LONGER REQUIRES A SITTER. PATIENT IS COOPERATIVE 
WITH THE PLAN OF CARE, MAKES NEEDS KNOWN, ALL SCHEDULED MEDICATIONS TAKEN. DOES NOT ATTEMPT 
TO GET OUT OF BED, KNOWS THAT HE IS IN A HOSPITAL. SAFETY MAINTAINED, CALL LIGHT WITHIN 
REACH, WILL CONTINUE TO MONITOR CLOSELY.

## 2020-07-29 NOTE — NUR
RN CLOSING NOTES:

PT IN BED SLEEPING. PT DID NOT SHOW ANY SIGNS OF RESPIRATORY DISTRESS, BREATHING EVEN AND 
UNLABORED. PT DID NOT SHOW ANY SIGNS OF PAIN. SAFETY MEASURES TAKEN CALL LIGHT WITHIN REACH. 
ENDORSED TO THE PM NURSE.

## 2020-07-29 NOTE — NUR
RN OPENING NOTE

PT IN BED A/O X 1, ON O2 2 L/MIN VIA NC, TOLERATING WELL. DENIES PAIN AT THIS TIME.  IV TO 
LEFT HAND PATENT INTACT AND FLUSHING WELL. CURRENTLY SINUS RHYTHM WITH PVC'S. SAFETY 
MEASURES IN PLACE, CALL LIGHT WITHIN REACH. BED LOCKED AND IN LOWEST POSITION, SIDE RAILS UP 
X 2. WILL CONTINUE TO MONITOR PT.

## 2020-07-29 NOTE — NUR
RN CLOSING NOTES

PT ON BED ASLEEP EASY TO AWAKE NO SIGN AND SYMPTOMS OF RESPIRATORY DISTRESS, SPO2 >99% TELE 
MONITOR READS SINUS RHYTHM WITH PVC  AND BIGEMINAL PVC'S NO SIGNIFICANT CHANGES ON CONDITION 
NOTED ALL NEEDS ATTENDED DROPLET ISOLATION MAINTAINED SAFETY MEASURE OBSERVED WILL ENDORSED 
TO AM SHIFT NURSE

## 2020-07-29 NOTE — NUR
RN NOTES

REPORTED TO YFN MCKINLEY NP THAT THE PT STILL ON PAIN AFTER GIVING NORCO AND HIS KNEE IS 
SWOLLEN AND WARM TO TOUCH, ON CALL DOCTOR MADE AN ORDER FOR MORPHINE 2MG IV X1 NOW NOTED AND 
CARRIED OUT

## 2020-07-29 NOTE — NUR
RN OPENING NOTES:

RECEIVED PATIENT IN BED SLEEPING. PT O2 98% ON 2L. PT DID NOT SHOW ANY SIGNS OF RESPIRATORY 
DISTRESS, BREATHING EVEN AND UNLABORED. TELE MONITOR SR. PT IS A/O X2. IV LH #20. PT IS WITH 
THE SITTER. PATIENTS SAFETY IS MAINTAINED CALL LIGHT WITHIN REACH. WILL MONITOR CLOSELY.

## 2020-07-29 NOTE — NUR
RN NOTE



CLARIFIED WITH PHARMACY TO RUN MAGNESIUM AT 100MLS/HR. 1GM OVER 1HR. WILL RUN AS ORDERED. 
SAFETY MAINTAINED, CALL LIGHT WITHIN REACH, WILL CONTINUE TO MONITOR CLOSELY.

## 2020-07-30 VITALS — SYSTOLIC BLOOD PRESSURE: 138 MMHG | DIASTOLIC BLOOD PRESSURE: 90 MMHG

## 2020-07-30 VITALS — DIASTOLIC BLOOD PRESSURE: 81 MMHG | SYSTOLIC BLOOD PRESSURE: 118 MMHG

## 2020-07-30 VITALS — DIASTOLIC BLOOD PRESSURE: 67 MMHG | SYSTOLIC BLOOD PRESSURE: 120 MMHG

## 2020-07-30 VITALS — DIASTOLIC BLOOD PRESSURE: 90 MMHG | SYSTOLIC BLOOD PRESSURE: 138 MMHG

## 2020-07-30 VITALS — SYSTOLIC BLOOD PRESSURE: 136 MMHG | DIASTOLIC BLOOD PRESSURE: 69 MMHG

## 2020-07-30 VITALS — DIASTOLIC BLOOD PRESSURE: 68 MMHG | SYSTOLIC BLOOD PRESSURE: 104 MMHG

## 2020-07-30 VITALS — DIASTOLIC BLOOD PRESSURE: 55 MMHG | SYSTOLIC BLOOD PRESSURE: 122 MMHG

## 2020-07-30 VITALS — SYSTOLIC BLOOD PRESSURE: 119 MMHG | DIASTOLIC BLOOD PRESSURE: 58 MMHG

## 2020-07-30 LAB
ALBUMIN SERPL BCP-MCNC: 2.8 G/DL (ref 3.4–5)
ALP SERPL-CCNC: 68 U/L (ref 46–116)
ALT SERPL W P-5'-P-CCNC: 18 U/L (ref 12–78)
AST SERPL W P-5'-P-CCNC: 23 U/L (ref 15–37)
BASOPHILS # BLD AUTO: 0 /CMM (ref 0–0.2)
BASOPHILS NFR BLD AUTO: 0.1 % (ref 0–2)
BILIRUB SERPL-MCNC: 2.2 MG/DL (ref 0.2–1)
BUN SERPL-MCNC: 22 MG/DL (ref 7–18)
CALCIUM SERPL-MCNC: 8 MG/DL (ref 8.5–10.1)
CHLORIDE SERPL-SCNC: 104 MMOL/L (ref 98–107)
CO2 SERPL-SCNC: 24 MMOL/L (ref 21–32)
CREAT SERPL-MCNC: 1.3 MG/DL (ref 0.6–1.3)
EOSINOPHIL NFR BLD AUTO: 0.7 % (ref 0–6)
GLUCOSE SERPL-MCNC: 194 MG/DL (ref 74–106)
HCT VFR BLD AUTO: 42 % (ref 39–51)
HGB BLD-MCNC: 13.8 G/DL (ref 13.5–17.5)
LYMPHOCYTES NFR BLD AUTO: 1.2 /CMM (ref 0.8–4.8)
LYMPHOCYTES NFR BLD AUTO: 10.5 % (ref 20–44)
MAGNESIUM SERPL-MCNC: 1.9 MG/DL (ref 1.8–2.4)
MCHC RBC AUTO-ENTMCNC: 33 G/DL (ref 31–36)
MCV RBC AUTO: 87 FL (ref 80–96)
MONOCYTES NFR BLD AUTO: 1.7 /CMM (ref 0.1–1.3)
MONOCYTES NFR BLD AUTO: 14.9 % (ref 2–12)
NEUTROPHILS # BLD AUTO: 8.2 /CMM (ref 1.8–8.9)
NEUTROPHILS NFR BLD AUTO: 73.8 % (ref 43–81)
PHOSPHATE SERPL-MCNC: 2.1 MG/DL (ref 2.5–4.9)
PLATELET # BLD AUTO: 176 /CMM (ref 150–450)
POTASSIUM SERPL-SCNC: 3.4 MMOL/L (ref 3.5–5.1)
PROT SERPL-MCNC: 6.3 G/DL (ref 6.4–8.2)
RBC # BLD AUTO: 4.87 MIL/UL (ref 4.5–6)
SODIUM SERPL-SCNC: 136 MMOL/L (ref 136–145)
WBC NRBC COR # BLD AUTO: 11.1 K/UL (ref 4.3–11)

## 2020-07-30 RX ADMIN — TAMSULOSIN HYDROCHLORIDE SCH MG: 0.4 CAPSULE ORAL at 21:05

## 2020-07-30 RX ADMIN — Medication SCH MG: at 08:07

## 2020-07-30 RX ADMIN — Medication SCH EACH: at 08:08

## 2020-07-30 RX ADMIN — INSULIN HUMAN PRN UNIT: 100 INJECTION, SOLUTION PARENTERAL at 22:27

## 2020-07-30 RX ADMIN — INSULIN HUMAN PRN UNIT: 100 INJECTION, SOLUTION PARENTERAL at 08:31

## 2020-07-30 RX ADMIN — FERROUS SULFATE TAB 325 MG (65 MG ELEMENTAL FE) SCH MG: 325 (65 FE) TAB at 08:07

## 2020-07-30 RX ADMIN — Medication SCH EACH: at 11:47

## 2020-07-30 RX ADMIN — Medication PRN EACH: at 09:48

## 2020-07-30 RX ADMIN — FOLIC ACID SCH MG: 1 TABLET ORAL at 08:07

## 2020-07-30 RX ADMIN — ACETAMINOPHEN PRN MG: 325 TABLET ORAL at 03:49

## 2020-07-30 RX ADMIN — PANTOPRAZOLE SODIUM SCH MG: 40 TABLET, DELAYED RELEASE ORAL at 08:07

## 2020-07-30 RX ADMIN — MONTELUKAST SODIUM SCH MG: 10 TABLET, FILM COATED ORAL at 08:07

## 2020-07-30 RX ADMIN — INSULIN HUMAN PRN UNIT: 100 INJECTION, SOLUTION PARENTERAL at 11:47

## 2020-07-30 RX ADMIN — TRAZODONE HYDROCHLORIDE SCH MG: 50 TABLET ORAL at 21:04

## 2020-07-30 RX ADMIN — Medication SCH EACH: at 18:27

## 2020-07-30 RX ADMIN — HALOPERIDOL SCH MG: 1 TABLET ORAL at 16:10

## 2020-07-30 RX ADMIN — ESCITALOPRAM OXALATE SCH MG: 10 TABLET, FILM COATED ORAL at 08:07

## 2020-07-30 RX ADMIN — LISINOPRIL SCH MG: 20 TABLET ORAL at 08:07

## 2020-07-30 RX ADMIN — Medication PRN EACH: at 18:35

## 2020-07-30 RX ADMIN — ATORVASTATIN CALCIUM SCH MG: 10 TABLET, FILM COATED ORAL at 21:05

## 2020-07-30 RX ADMIN — LINAGLIPTIN SCH MG: 5 TABLET, FILM COATED ORAL at 08:07

## 2020-07-30 RX ADMIN — INSULIN HUMAN PRN UNIT: 100 INJECTION, SOLUTION PARENTERAL at 18:35

## 2020-07-30 RX ADMIN — Medication SCH EACH: at 22:26

## 2020-07-30 RX ADMIN — HALOPERIDOL SCH MG: 1 TABLET ORAL at 08:07

## 2020-07-30 NOTE — NUR
MS RN OPENING NOTES



PATIENT RECEIVED RESTING IN BED A/O X 2. ON 3L OF O2 WITH BREATHING EVEN AND UNLABORED, NO 
SOB NOTED. NO SIGNS OF ACUTE DISTRESS. NO COMPLAINTS OF PAIN OR DISCOMFORT. IV LOCATED ON L 
HAND # 20 S/L. SAFETY PRECAUTIONS IN PLACE WITH BED IN LOWEST POSITION, CALL LIGHT WITHIN 
REACH, BREAKS ON, SIDE RAILS UP. WILL CONTINUE TO MONITOR THROUGH OUT THE SHIFT.

## 2020-07-30 NOTE — NUR
RN NOTES

 PATIENT TRANSFERRED FROM [facility] MALE 70Y/OLD  TELE HR-92 TO 95. REPORT GET FROM RAMON RN.  V/S 
TAKEN /55, P-84, R-21, T-99.4, O2-3LNC. PATIENT WAS COMPLAINING OF PAIN ON BLE 6/10 
PER PATIENT REQUEST, SWOLLEN RIGHT KNEE, USING URINAL.   PATIENT STABLE NO SOB, DIMINISHED 
ALETA SOUNDS.CALL LIGHT WITHIN TO REACH. SAFETY PRECAUTION MAINTAINED ALL THE TIME.

## 2020-07-30 NOTE — NUR
RN CLOSING NOTE

PT AWAKE WITH HOB ELEVATED ON O2 2 L/MIN VIA NC, TOLERATING WELL. DENIES PAIN AT THIS TIME.  
IV TO LEFT HAND PATENT INTACT AND FLUSHING WELL D5NS INFUSING AT 75 ML/HR. PT SINUS SINUS 
RHYTHM WITH PVC'S AND BIGEMINY. SAFETY MEASURES IN PLACE, CALL LIGHT WITHIN REACH. BED 
LOCKED AND IN LOWEST POSITION, SIDE RAILS UP X 3. ENDORSED TO AM RN FOR WARREN

## 2020-07-30 NOTE — NUR
RN OPENING NOTES:

RECEIVED PT IN BED SLEEPING, EASY TO AROUSE. NO SIGNS OF RESPIRATORY DISTRESS OR DIFFICULTY 
BREATHING. NO SIGNS OF PAIN, BREATHING EVEN AND UNLABORED. PT IS ON NC 3L, O2 97%, ON TELE 
MONITOR SR, PT IS A/O X1. LH #20,INTACT, FLUSHED WELL NO SIGNS OF INFILTRATION. SAFETY 
MEASURES TAKEN, CALL LIGHT WITHIN REACH, WILL CONTINUE TO MONITOR CLOSELY.

## 2020-07-30 NOTE — NUR
RN NOTES

 PATIENT IN THE BED EATING, ADMINISTERED NARCO 5/325 MG PO PRN FOR GENERALIZED PAIN 7/10 PER 
PATIENT REQUEST BS-214 MG/DL COVERAGE GIVEN, ALSO ADMINISTERED SCHEDULED MEDICATION. PATIENT 
ON O2-3L NC. ASSIST TURN AND REPOSTION Q 2 HR. , USING URINAL. CALL LIGHT WITHIN TO REACH. 
ENDORSED ONCOMING NURSE FOLLOW PLAN OF CARE.

## 2020-07-30 NOTE — NUR
RN NOTE



PATIENT COVID RESULTS CAME BACK NEGATIVE. MD NOTIFIED. OK TO TRANSFER PATIENT TO MED-SURG. 
FOLLOWED ORDER. PATIENT TRANSFERRED TO [facility] BED [facility], REPORT GIVEN TO CRYSTAL GARCIA FOR WARREN. 
ALL PATIENT NEEDS MET, CALL LIGHT WITHIN REACH, ENDORSED PATIENT FOR CONTINUITY OF CARE.

## 2020-07-31 VITALS — DIASTOLIC BLOOD PRESSURE: 93 MMHG | SYSTOLIC BLOOD PRESSURE: 142 MMHG

## 2020-07-31 VITALS — SYSTOLIC BLOOD PRESSURE: 107 MMHG | DIASTOLIC BLOOD PRESSURE: 64 MMHG

## 2020-07-31 VITALS — SYSTOLIC BLOOD PRESSURE: 149 MMHG | DIASTOLIC BLOOD PRESSURE: 99 MMHG

## 2020-07-31 LAB
APPEARANCE UR: CLEAR
BILIRUB UR QL STRIP: NEGATIVE
BUN SERPL-MCNC: 21 MG/DL (ref 7–18)
CALCIUM SERPL-MCNC: 8.7 MG/DL (ref 8.5–10.1)
CHLORIDE SERPL-SCNC: 106 MMOL/L (ref 98–107)
CO2 SERPL-SCNC: 24 MMOL/L (ref 21–32)
COLOR UR: YELLOW
CREAT SERPL-MCNC: 1.3 MG/DL (ref 0.6–1.3)
GLUCOSE SERPL-MCNC: 170 MG/DL (ref 74–106)
GLUCOSE UR STRIP-MCNC: NEGATIVE MG/DL
HGB UR QL STRIP: (no result) ERY/UL
KETONES UR STRIP-MCNC: 15 MG/DL
LEUKOCYTE ESTERASE UR QL STRIP: NEGATIVE
NITRITE UR QL STRIP: NEGATIVE
PH UR STRIP: 6 [PH] (ref 5–8)
PHOSPHATE SERPL-MCNC: 2.3 MG/DL (ref 2.5–4.9)
POTASSIUM SERPL-SCNC: 3.9 MMOL/L (ref 3.5–5.1)
PROT UR QL STRIP: 100 MG/DL
RBC #/AREA URNS HPF: (no result) /HPF (ref 0–2)
SODIUM SERPL-SCNC: 138 MMOL/L (ref 136–145)
UROBILINOGEN UR STRIP-MCNC: 0.2 EU/DL
WBC #/AREA URNS HPF: (no result) /HPF (ref 0–3)

## 2020-07-31 PROCEDURE — 0JBR0ZZ EXCISION OF LEFT FOOT SUBCUTANEOUS TISSUE AND FASCIA, OPEN APPROACH: ICD-10-PCS | Performed by: STUDENT IN AN ORGANIZED HEALTH CARE EDUCATION/TRAINING PROGRAM

## 2020-07-31 RX ADMIN — Medication SCH EACH: at 11:42

## 2020-07-31 RX ADMIN — TAMSULOSIN HYDROCHLORIDE SCH MG: 0.4 CAPSULE ORAL at 21:40

## 2020-07-31 RX ADMIN — PANTOPRAZOLE SODIUM SCH MG: 40 TABLET, DELAYED RELEASE ORAL at 08:20

## 2020-07-31 RX ADMIN — LINAGLIPTIN SCH MG: 5 TABLET, FILM COATED ORAL at 08:21

## 2020-07-31 RX ADMIN — Medication SCH EACH: at 16:58

## 2020-07-31 RX ADMIN — Medication SCH EACH: at 06:37

## 2020-07-31 RX ADMIN — INSULIN HUMAN PRN UNIT: 100 INJECTION, SOLUTION PARENTERAL at 21:42

## 2020-07-31 RX ADMIN — MONTELUKAST SODIUM SCH MG: 10 TABLET, FILM COATED ORAL at 08:20

## 2020-07-31 RX ADMIN — Medication SCH MG: at 08:21

## 2020-07-31 RX ADMIN — TRAZODONE HYDROCHLORIDE SCH MG: 50 TABLET ORAL at 21:40

## 2020-07-31 RX ADMIN — INSULIN HUMAN PRN UNIT: 100 INJECTION, SOLUTION PARENTERAL at 06:39

## 2020-07-31 RX ADMIN — INSULIN HUMAN PRN UNIT: 100 INJECTION, SOLUTION PARENTERAL at 11:45

## 2020-07-31 RX ADMIN — Medication PRN EACH: at 11:47

## 2020-07-31 RX ADMIN — HALOPERIDOL SCH MG: 1 TABLET ORAL at 16:58

## 2020-07-31 RX ADMIN — DEXTROSE MONOHYDRATE SCH MLS/HR: 50 INJECTION, SOLUTION INTRAVENOUS at 20:44

## 2020-07-31 RX ADMIN — FERROUS SULFATE TAB 325 MG (65 MG ELEMENTAL FE) SCH MG: 325 (65 FE) TAB at 08:21

## 2020-07-31 RX ADMIN — ESCITALOPRAM OXALATE SCH MG: 10 TABLET, FILM COATED ORAL at 08:21

## 2020-07-31 RX ADMIN — INSULIN HUMAN PRN UNIT: 100 INJECTION, SOLUTION PARENTERAL at 17:05

## 2020-07-31 RX ADMIN — FOLIC ACID SCH MG: 1 TABLET ORAL at 08:21

## 2020-07-31 RX ADMIN — ATORVASTATIN CALCIUM SCH MG: 10 TABLET, FILM COATED ORAL at 21:40

## 2020-07-31 RX ADMIN — LISINOPRIL SCH MG: 20 TABLET ORAL at 08:20

## 2020-07-31 RX ADMIN — Medication SCH EACH: at 21:40

## 2020-07-31 RX ADMIN — HALOPERIDOL SCH MG: 1 TABLET ORAL at 08:20

## 2020-07-31 NOTE — NUR
MS RN CLOSING NOTES



PATIENT RESTING IN BED A/O X 2. ON 4L OF O2 WITH BREATHING EVEN AND UNLABORED, NO SOB NOTED. 
NO SIGNS OF ACUTE DISTRESS. NO COMPLAINTS OF PAIN OR DISCOMFORT AT THE MOMENT. IV LOCATED ON 
L HAND # 20 S/L. SAFETY PRECAUTIONS IN PLACE WITH BED IN LOWEST POSITION, CALL LIGHT WITHIN 
REACH, BREAKS ON, SIDE RAILS UP. ALL NEEDS ATTENDED TO. WILL ENDORSE TO ONCOMING SHIFT ABOUT 
WARREN.

## 2020-07-31 NOTE — NUR
MS RN NOTES



SPOKE WITH PHARMACY REGARDING VANCO FOR PATIENT; PER PHARM TECH, WILL SEND UP VANCO PRIOR TO 
LEAVING; STILL AWAITING PHARMACY TO BRING UP VANCO IV MED UP FOR PATIENT; UNABLE TO START 
VANCO AT THIS TIME; CHARGE NURSE AWARE

## 2020-07-31 NOTE — NUR
WOUND CARE CONSULT: PT PRESENTS WITH RAISED CALLUS TO LEFT PLANTAR FOOT. PT STATES HAS BEEN 
THERE FOR SOME TIME. RECOMMEND DPM CONSULT. DR ANTHONY NOTIFIED OF CONSULT REQUEST. PT IS 
CONTINENT AND INDEPENDENT WITH BED MOBILITY. WILL SEE PRCIARAN GARCIA IN AGREEMENT WITH PLAN OF 
CARE.

## 2020-07-31 NOTE — NUR
MS RN OPENING NOTES  



RECEIVED PATIENT RESTING IN BED COMFORTABLY; A/OX2; BREATHING EVEN AND UNLABORED; PATIENT ON 
2LPM VIA NC; SATTING 96%; NO SOB; L HAND #24 INTACT AND PATENT; FLUSHING WELL; NO S/S OF 
REDNESS OR INFILTRATION NOTED; SAFETY PRECAUTIONS IMPLEMENTED; BED LOCKED IN LOW POSITION; 
SIDE RAILSX2; CALL LIGHT WITHIN REACH; WILL CONT TO MONITOR

## 2020-07-31 NOTE — NUR
rn notes



patient received on 2L nasal cannula, no sob noted, patient denies pain at this time.  L 
hand 20 present.  bed at the lowest setting, call light within reach, side rails up x2.

## 2020-07-31 NOTE — NUR
rn closing notes



patient remains on 2L nasal cannula, no sob noted, patient denies pain at this time.  L hand 
20 present.  bed at the lowest setting, call light within reach, side rails up x2.

## 2020-08-01 VITALS — DIASTOLIC BLOOD PRESSURE: 66 MMHG | SYSTOLIC BLOOD PRESSURE: 126 MMHG

## 2020-08-01 VITALS — SYSTOLIC BLOOD PRESSURE: 125 MMHG | DIASTOLIC BLOOD PRESSURE: 82 MMHG

## 2020-08-01 VITALS — DIASTOLIC BLOOD PRESSURE: 74 MMHG | SYSTOLIC BLOOD PRESSURE: 130 MMHG

## 2020-08-01 LAB
BASOPHILS # BLD AUTO: 0 /CMM (ref 0–0.2)
BASOPHILS NFR BLD AUTO: 0.1 % (ref 0–2)
BUN SERPL-MCNC: 16 MG/DL (ref 7–18)
CALCIUM SERPL-MCNC: 8.4 MG/DL (ref 8.5–10.1)
CHLORIDE SERPL-SCNC: 105 MMOL/L (ref 98–107)
CO2 SERPL-SCNC: 25 MMOL/L (ref 21–32)
CREAT SERPL-MCNC: 1 MG/DL (ref 0.6–1.3)
EOSINOPHIL NFR BLD AUTO: 0.1 % (ref 0–6)
GLUCOSE SERPL-MCNC: 145 MG/DL (ref 74–106)
HCT VFR BLD AUTO: 39 % (ref 39–51)
HGB BLD-MCNC: 12.6 G/DL (ref 13.5–17.5)
LYMPHOCYTES NFR BLD AUTO: 1.1 /CMM (ref 0.8–4.8)
LYMPHOCYTES NFR BLD AUTO: 9.3 % (ref 20–44)
MAGNESIUM SERPL-MCNC: 1.8 MG/DL (ref 1.8–2.4)
MCHC RBC AUTO-ENTMCNC: 33 G/DL (ref 31–36)
MCV RBC AUTO: 86 FL (ref 80–96)
MONOCYTES NFR BLD AUTO: 1.4 /CMM (ref 0.1–1.3)
MONOCYTES NFR BLD AUTO: 12.4 % (ref 2–12)
NEUTROPHILS # BLD AUTO: 8.9 /CMM (ref 1.8–8.9)
NEUTROPHILS NFR BLD AUTO: 78.1 % (ref 43–81)
PHOSPHATE SERPL-MCNC: 2.6 MG/DL (ref 2.5–4.9)
PLATELET # BLD AUTO: 263 /CMM (ref 150–450)
POTASSIUM SERPL-SCNC: 3.8 MMOL/L (ref 3.5–5.1)
RBC # BLD AUTO: 4.51 MIL/UL (ref 4.5–6)
SODIUM SERPL-SCNC: 138 MMOL/L (ref 136–145)
WBC NRBC COR # BLD AUTO: 11.4 K/UL (ref 4.3–11)

## 2020-08-01 RX ADMIN — ESCITALOPRAM OXALATE SCH MG: 10 TABLET, FILM COATED ORAL at 08:10

## 2020-08-01 RX ADMIN — LISINOPRIL SCH MG: 20 TABLET ORAL at 08:10

## 2020-08-01 RX ADMIN — INSULIN HUMAN PRN UNIT: 100 INJECTION, SOLUTION PARENTERAL at 06:50

## 2020-08-01 RX ADMIN — FERROUS SULFATE TAB 325 MG (65 MG ELEMENTAL FE) SCH MG: 325 (65 FE) TAB at 08:10

## 2020-08-01 RX ADMIN — ATORVASTATIN CALCIUM SCH MG: 10 TABLET, FILM COATED ORAL at 21:33

## 2020-08-01 RX ADMIN — LINAGLIPTIN SCH MG: 5 TABLET, FILM COATED ORAL at 08:10

## 2020-08-01 RX ADMIN — Medication SCH EACH: at 16:52

## 2020-08-01 RX ADMIN — INSULIN HUMAN PRN UNIT: 100 INJECTION, SOLUTION PARENTERAL at 11:29

## 2020-08-01 RX ADMIN — INSULIN HUMAN PRN UNIT: 100 INJECTION, SOLUTION PARENTERAL at 22:28

## 2020-08-01 RX ADMIN — HALOPERIDOL SCH MG: 1 TABLET ORAL at 08:10

## 2020-08-01 RX ADMIN — PANTOPRAZOLE SODIUM SCH MG: 40 TABLET, DELAYED RELEASE ORAL at 08:10

## 2020-08-01 RX ADMIN — DEXTROSE MONOHYDRATE SCH MLS/HR: 50 INJECTION, SOLUTION INTRAVENOUS at 20:35

## 2020-08-01 RX ADMIN — Medication SCH EACH: at 06:47

## 2020-08-01 RX ADMIN — TRAZODONE HYDROCHLORIDE SCH MG: 50 TABLET ORAL at 21:33

## 2020-08-01 RX ADMIN — Medication SCH EACH: at 22:23

## 2020-08-01 RX ADMIN — DEXTROSE MONOHYDRATE SCH MLS/HR: 50 INJECTION, SOLUTION INTRAVENOUS at 08:12

## 2020-08-01 RX ADMIN — HALOPERIDOL SCH MG: 1 TABLET ORAL at 16:06

## 2020-08-01 RX ADMIN — Medication SCH MG: at 08:10

## 2020-08-01 RX ADMIN — INSULIN HUMAN PRN UNIT: 100 INJECTION, SOLUTION PARENTERAL at 16:53

## 2020-08-01 RX ADMIN — TAMSULOSIN HYDROCHLORIDE SCH MG: 0.4 CAPSULE ORAL at 21:33

## 2020-08-01 RX ADMIN — Medication SCH EACH: at 11:30

## 2020-08-01 RX ADMIN — MONTELUKAST SODIUM SCH MG: 10 TABLET, FILM COATED ORAL at 08:10

## 2020-08-01 RX ADMIN — FOLIC ACID SCH MG: 1 TABLET ORAL at 08:10

## 2020-08-01 NOTE — NUR
MS RN NOTES



PATIENT REMOVED IV; IV TIP INTACT; NO S/S OF BLEEDING; PATIENT REFUSING IV ACCESS AT THIS 
TIME; CHARGE NURSE AWARE; WILL CONT TO MONITOR

## 2020-08-01 NOTE — NUR
RN OPENING NOTE



RECEIVED PATIENT IN BED RESTING ALERT ORIENTED X2 VERBALLY RESPONSIVE,BREATHING IS SOB ON 2L 
OXYGEN VIA NASAL CANNULA,O2:95% CONTINUE TO MONITOR,IV SITE IS ON LEFT FOREARM,INTACT 
PATENT,CALL LIGHT WITHIN REACH,BED IN LOWEST POSITION,IMPLEMENT SAFETY MEASURE,CONTINUE TO 
MONITOR.

## 2020-08-01 NOTE — NUR
rn notes



patient remains on 2L nasal cannula, no sob noted, patient denies pain at this time.  L hand 
20 present.  bed at the lowest setting, call light within reach, side rails up x2.  Plan is 
to dc patient once culture results are back.

## 2020-08-01 NOTE — NUR
MS RN NOTES



IV ACCESS OBTAINED; R HAND #20 INTACT AND PATENT, FLUSHING WELL; NO S/S OF REDNESS OR 
INFILTRATION NOTED;

## 2020-08-01 NOTE — NUR
MS RN CLOSING NOTES 



PATIENT RESTING IN BED COMFORTABLY; A/OX2-3; WHEEZING HEARD, PATIENT ON 4LPM VIA NC; 
TOLERATING WELL; PATIENT AGREED FOR IV ACCESS; R HAND #20 INTACT AND FLUSHING WELL; NO S/S 
OF REDNESS OR INFILTRATION NOTED; WRAPPED JEWEL, CHARGE NURSE AWARE; ALL NEEDS RENDERED; 
BED LOCKED IN LOW POSITION; SIDE RAILSX2; CALL LIGHT WITHIN REACH; WILL ENDORSE WARREN TO 
ONCOMING SHIFT

## 2020-08-02 VITALS — DIASTOLIC BLOOD PRESSURE: 57 MMHG | SYSTOLIC BLOOD PRESSURE: 120 MMHG

## 2020-08-02 VITALS — SYSTOLIC BLOOD PRESSURE: 129 MMHG | DIASTOLIC BLOOD PRESSURE: 69 MMHG

## 2020-08-02 VITALS — SYSTOLIC BLOOD PRESSURE: 129 MMHG | DIASTOLIC BLOOD PRESSURE: 70 MMHG

## 2020-08-02 LAB
BASOPHILS # BLD AUTO: 0 /CMM (ref 0–0.2)
BASOPHILS NFR BLD AUTO: 0.4 % (ref 0–2)
BUN SERPL-MCNC: 15 MG/DL (ref 7–18)
CALCIUM SERPL-MCNC: 8.5 MG/DL (ref 8.5–10.1)
CHLORIDE SERPL-SCNC: 102 MMOL/L (ref 98–107)
CO2 SERPL-SCNC: 25 MMOL/L (ref 21–32)
CREAT SERPL-MCNC: 1 MG/DL (ref 0.6–1.3)
EOSINOPHIL NFR BLD AUTO: 0.3 % (ref 0–6)
GLUCOSE SERPL-MCNC: 132 MG/DL (ref 74–106)
HCT VFR BLD AUTO: 37 % (ref 39–51)
HGB BLD-MCNC: 12.5 G/DL (ref 13.5–17.5)
LYMPHOCYTES NFR BLD AUTO: 1 /CMM (ref 0.8–4.8)
LYMPHOCYTES NFR BLD AUTO: 10.1 % (ref 20–44)
MAGNESIUM SERPL-MCNC: 1.8 MG/DL (ref 1.8–2.4)
MCHC RBC AUTO-ENTMCNC: 34 G/DL (ref 31–36)
MCV RBC AUTO: 85 FL (ref 80–96)
MONOCYTES NFR BLD AUTO: 1.4 /CMM (ref 0.1–1.3)
MONOCYTES NFR BLD AUTO: 13.6 % (ref 2–12)
NEUTROPHILS # BLD AUTO: 7.9 /CMM (ref 1.8–8.9)
NEUTROPHILS NFR BLD AUTO: 75.6 % (ref 43–81)
PHOSPHATE SERPL-MCNC: 2.9 MG/DL (ref 2.5–4.9)
PLATELET # BLD AUTO: 292 /CMM (ref 150–450)
POTASSIUM SERPL-SCNC: 3.6 MMOL/L (ref 3.5–5.1)
RBC # BLD AUTO: 4.34 MIL/UL (ref 4.5–6)
SODIUM SERPL-SCNC: 136 MMOL/L (ref 136–145)
WBC NRBC COR # BLD AUTO: 10.4 K/UL (ref 4.3–11)

## 2020-08-02 RX ADMIN — Medication SCH EACH: at 11:42

## 2020-08-02 RX ADMIN — MONTELUKAST SODIUM SCH MG: 10 TABLET, FILM COATED ORAL at 08:37

## 2020-08-02 RX ADMIN — ESCITALOPRAM OXALATE SCH MG: 10 TABLET, FILM COATED ORAL at 08:39

## 2020-08-02 RX ADMIN — DEXTROSE MONOHYDRATE SCH MLS/HR: 50 INJECTION, SOLUTION INTRAVENOUS at 08:36

## 2020-08-02 RX ADMIN — INSULIN HUMAN PRN UNIT: 100 INJECTION, SOLUTION PARENTERAL at 08:35

## 2020-08-02 RX ADMIN — Medication SCH EACH: at 17:26

## 2020-08-02 RX ADMIN — LINAGLIPTIN SCH MG: 5 TABLET, FILM COATED ORAL at 08:37

## 2020-08-02 RX ADMIN — INSULIN HUMAN PRN UNIT: 100 INJECTION, SOLUTION PARENTERAL at 21:32

## 2020-08-02 RX ADMIN — FOLIC ACID SCH MG: 1 TABLET ORAL at 08:37

## 2020-08-02 RX ADMIN — TAMSULOSIN HYDROCHLORIDE SCH MG: 0.4 CAPSULE ORAL at 21:27

## 2020-08-02 RX ADMIN — LISINOPRIL SCH MG: 20 TABLET ORAL at 08:39

## 2020-08-02 RX ADMIN — Medication SCH EACH: at 08:17

## 2020-08-02 RX ADMIN — HALOPERIDOL SCH MG: 1 TABLET ORAL at 08:41

## 2020-08-02 RX ADMIN — Medication SCH EACH: at 21:31

## 2020-08-02 RX ADMIN — INSULIN HUMAN PRN UNIT: 100 INJECTION, SOLUTION PARENTERAL at 12:48

## 2020-08-02 RX ADMIN — INSULIN HUMAN PRN UNIT: 100 INJECTION, SOLUTION PARENTERAL at 17:29

## 2020-08-02 RX ADMIN — Medication SCH MG: at 08:37

## 2020-08-02 RX ADMIN — DEXTROSE MONOHYDRATE SCH MLS/HR: 50 INJECTION, SOLUTION INTRAVENOUS at 20:12

## 2020-08-02 RX ADMIN — PANTOPRAZOLE SODIUM SCH MG: 40 TABLET, DELAYED RELEASE ORAL at 07:30

## 2020-08-02 RX ADMIN — ACETAMINOPHEN PRN MG: 325 TABLET ORAL at 20:12

## 2020-08-02 RX ADMIN — FERROUS SULFATE TAB 325 MG (65 MG ELEMENTAL FE) SCH MG: 325 (65 FE) TAB at 08:37

## 2020-08-02 RX ADMIN — ATORVASTATIN CALCIUM SCH MG: 10 TABLET, FILM COATED ORAL at 21:27

## 2020-08-02 RX ADMIN — TRAZODONE HYDROCHLORIDE SCH MG: 50 TABLET ORAL at 21:27

## 2020-08-02 RX ADMIN — HALOPERIDOL SCH MG: 1 TABLET ORAL at 16:56

## 2020-08-02 NOTE — NUR
MS RN OPENING NOTES 



PATIENT RECEIVED RESTING IN BED A/O X 2-3. ON 2L OF O2 TOLERATING WELL. NO SIGNS OF ACUTE 
DISTRESS. NO COMPLAINTS OF PAIN OR DISCOMFORT. IV LOCATED ON L FA #22. SAFETY PRECAUTIONS IN 
PLACE WITH BED IN LOWEST POSITION, CALL LIGHT WITHIN REACH, BREAKS ON, SIDE RAILS UP. WILL 
CONTINUE TO MONITOR THROUGHOUT THE NIGHT.

## 2020-08-02 NOTE — NUR
MS RN CLOSING NOTES 



PATIENT RESTING IN BED COMFORTABLY; A/OX2; DIFICULTY BREATHING AT TIMES, PATIENT ON 4LPM VIA 
NC; TOLERATING WELL; PATIENT IV ACCESS; LFA #22 INTACT AND FLUSHING WELL; NO S/S OF REDNESS 
OR INFILTRATION NOTED; WRAPPED KERLIX;INCONTINENT OF B&B,  HAD A BM X 2 ALL NEEDS RENDERED; 
BED LOCKED IN LOW POSITION; SIDE RAILSX2; CALL LIGHT WITHIN REACH; WILL ENDORSE  TO ONCOMING 
SHIFT

## 2020-08-02 NOTE — NUR
RN CLOSING NOTE



PATIENT REMAINS ALERT ORIENTED X2 VERBALLY RESPONSIVE ON 2L OXYGEN VIA NASAL CANNULA,NO SOB 
NOT ACUTE DISTRESS NOTED,ALL DUE MEDS GIVEN AS TOLERATED,KEPT CLEAN AND DRY ALL THE 
TIME,IMPLEMENTED SAFETY MEASURE,ALL NEEDS MET,ENDORSE NEXT COMING SHIFT FOR CONTINUATION OF 
CARE.

## 2020-08-02 NOTE — NUR
RN OPENING NOTE



RECEIVED PATIENT IN BED RESTING ALERT ORIENTED X2 VERBALLY RESPONSIVE,BREATHING IS SOB ON 2L 
OXYGEN VIA NASAL CANNULA,O2:97% CONTINUE TO MONITOR,IV SITE IS ON LEFT FOREARM,INTACT 
PATENT,CALL LIGHT WITHIN REACH,BED IN LOWEST POSITION,IMPLEMENT SAFETY MEASURE,CONTINUE TO 
MONITOR.

## 2020-08-02 NOTE — NUR
MS RN NOTES 



PATIENT T 100.5 PRN TYLENOL ADMINISTERED AND COOLING MEASURES INITIATED. WILL CONTINUE TO 
MONITOR THROUGHOUT THE NIGHT.

## 2020-08-03 VITALS — DIASTOLIC BLOOD PRESSURE: 63 MMHG | SYSTOLIC BLOOD PRESSURE: 102 MMHG

## 2020-08-03 VITALS — DIASTOLIC BLOOD PRESSURE: 71 MMHG | SYSTOLIC BLOOD PRESSURE: 110 MMHG

## 2020-08-03 VITALS — DIASTOLIC BLOOD PRESSURE: 70 MMHG | SYSTOLIC BLOOD PRESSURE: 143 MMHG

## 2020-08-03 LAB
BUN SERPL-MCNC: 19 MG/DL (ref 7–18)
CALCIUM SERPL-MCNC: 8.5 MG/DL (ref 8.5–10.1)
CHLORIDE SERPL-SCNC: 104 MMOL/L (ref 98–107)
CO2 SERPL-SCNC: 27 MMOL/L (ref 21–32)
CREAT SERPL-MCNC: 1.1 MG/DL (ref 0.6–1.3)
GLUCOSE SERPL-MCNC: 130 MG/DL (ref 74–106)
POTASSIUM SERPL-SCNC: 3.8 MMOL/L (ref 3.5–5.1)
SODIUM SERPL-SCNC: 140 MMOL/L (ref 136–145)

## 2020-08-03 RX ADMIN — DEXTROSE MONOHYDRATE SCH MLS/HR: 50 INJECTION, SOLUTION INTRAVENOUS at 20:10

## 2020-08-03 RX ADMIN — Medication SCH EACH: at 06:55

## 2020-08-03 RX ADMIN — INSULIN HUMAN PRN UNIT: 100 INJECTION, SOLUTION PARENTERAL at 21:48

## 2020-08-03 RX ADMIN — MONTELUKAST SODIUM SCH MG: 10 TABLET, FILM COATED ORAL at 08:08

## 2020-08-03 RX ADMIN — ATORVASTATIN CALCIUM SCH MG: 10 TABLET, FILM COATED ORAL at 22:12

## 2020-08-03 RX ADMIN — Medication SCH EACH: at 16:44

## 2020-08-03 RX ADMIN — LINAGLIPTIN SCH MG: 5 TABLET, FILM COATED ORAL at 08:09

## 2020-08-03 RX ADMIN — LISINOPRIL SCH MG: 20 TABLET ORAL at 08:26

## 2020-08-03 RX ADMIN — DEXTROSE MONOHYDRATE SCH MLS/HR: 50 INJECTION, SOLUTION INTRAVENOUS at 08:14

## 2020-08-03 RX ADMIN — Medication SCH EACH: at 11:54

## 2020-08-03 RX ADMIN — TRAZODONE HYDROCHLORIDE SCH MG: 50 TABLET ORAL at 22:11

## 2020-08-03 RX ADMIN — FOLIC ACID SCH MG: 1 TABLET ORAL at 08:09

## 2020-08-03 RX ADMIN — HALOPERIDOL SCH MG: 1 TABLET ORAL at 08:08

## 2020-08-03 RX ADMIN — INSULIN HUMAN PRN UNIT: 100 INJECTION, SOLUTION PARENTERAL at 06:55

## 2020-08-03 RX ADMIN — FERROUS SULFATE TAB 325 MG (65 MG ELEMENTAL FE) SCH MG: 325 (65 FE) TAB at 08:09

## 2020-08-03 RX ADMIN — PANTOPRAZOLE SODIUM SCH MG: 40 TABLET, DELAYED RELEASE ORAL at 08:08

## 2020-08-03 RX ADMIN — TAMSULOSIN HYDROCHLORIDE SCH MG: 0.4 CAPSULE ORAL at 22:12

## 2020-08-03 RX ADMIN — Medication SCH EACH: at 21:45

## 2020-08-03 RX ADMIN — ESCITALOPRAM OXALATE SCH MG: 10 TABLET, FILM COATED ORAL at 08:08

## 2020-08-03 RX ADMIN — INSULIN HUMAN PRN UNIT: 100 INJECTION, SOLUTION PARENTERAL at 11:55

## 2020-08-03 RX ADMIN — Medication PRN EACH: at 16:50

## 2020-08-03 RX ADMIN — Medication SCH MG: at 08:08

## 2020-08-03 RX ADMIN — HALOPERIDOL SCH MG: 1 TABLET ORAL at 16:49

## 2020-08-03 RX ADMIN — INSULIN HUMAN PRN UNIT: 100 INJECTION, SOLUTION PARENTERAL at 16:51

## 2020-08-03 NOTE — NUR
MS RN CLOSING NOTES 



PATIENT RECEIVED RESTING IN BED A/O X 2-3. ON 2L OF O2 TOLERATING WELL. NO SIGNS OF ACUTE 
DISTRESS. NO COMPLAINTS OF PAIN OR DISCOMFORT. IV LOCATED ON L FA #22. SAFETY PRECAUTIONS IN 
PLACE WITH BED IN LOWEST POSITION, CALL LIGHT WITHIN REACH, BREAKS ON, SIDE RAILS UP. ALL 
NEEDS ATTENDED TO. PATIENT KEPT CLEAN AND DRY. PATIENT TEMP LOWERED TO 98.6. WILL ENDORSE TO 
ONCOMING SHIFT ABOUT WARREN.

## 2020-08-03 NOTE — NUR
MS/RN NOTES



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 Notified Dr. Whitney and contact precautions implemented.

## 2020-08-03 NOTE — NUR
MS/RN OPENING NOTES 



Patient received resting in bed with HOB elevated, A& O x 3. No complaints of 
pain/discomfort at this time. Breathing even and non-labored on  2L oxygen via NC. No 
respiratory or cardiac distress noted. IV located on L FA #22, patent and intact, and 
flushing well. Sensation from all peripheral extremities noted. Fall precautions maintained. 
Will continue to monitor patient for any changes in condition.

## 2020-08-03 NOTE — NUR
MS/RN OPENING NOTES 



Patient received resting in bed , A& O x 3. No complaints of pain/discomfort at this time, 
last norco given at 1650. Breathing even and non-labored on  2L oxygen via NC. No 
respiratory or cardiac distress noted. IV located on L FA #22, patent and intact, and 
flushing well. Sensation from all peripheral extremities noted. Fall precautions maintained. 
Will endorse to night shift nurse.

## 2020-08-04 VITALS — DIASTOLIC BLOOD PRESSURE: 68 MMHG | SYSTOLIC BLOOD PRESSURE: 128 MMHG

## 2020-08-04 VITALS — SYSTOLIC BLOOD PRESSURE: 131 MMHG | DIASTOLIC BLOOD PRESSURE: 82 MMHG

## 2020-08-04 VITALS — SYSTOLIC BLOOD PRESSURE: 127 MMHG | DIASTOLIC BLOOD PRESSURE: 72 MMHG

## 2020-08-04 LAB
BASOPHILS # BLD AUTO: 0 /CMM (ref 0–0.2)
BASOPHILS NFR BLD AUTO: 0.4 % (ref 0–2)
BUN SERPL-MCNC: 22 MG/DL (ref 7–18)
CALCIUM SERPL-MCNC: 8.3 MG/DL (ref 8.5–10.1)
CHLORIDE SERPL-SCNC: 102 MMOL/L (ref 98–107)
CO2 SERPL-SCNC: 26 MMOL/L (ref 21–32)
CREAT SERPL-MCNC: 1 MG/DL (ref 0.6–1.3)
EOSINOPHIL NFR BLD AUTO: 2.2 % (ref 0–6)
GLUCOSE SERPL-MCNC: 123 MG/DL (ref 74–106)
HCT VFR BLD AUTO: 40 % (ref 39–51)
HGB BLD-MCNC: 13.3 G/DL (ref 13.5–17.5)
LYMPHOCYTES NFR BLD AUTO: 1.1 /CMM (ref 0.8–4.8)
LYMPHOCYTES NFR BLD AUTO: 11.7 % (ref 20–44)
MAGNESIUM SERPL-MCNC: 2.2 MG/DL (ref 1.8–2.4)
MCHC RBC AUTO-ENTMCNC: 34 G/DL (ref 31–36)
MCV RBC AUTO: 88 FL (ref 80–96)
MONOCYTES NFR BLD AUTO: 1.1 /CMM (ref 0.1–1.3)
MONOCYTES NFR BLD AUTO: 11.1 % (ref 2–12)
NEUTROPHILS # BLD AUTO: 7.3 /CMM (ref 1.8–8.9)
NEUTROPHILS NFR BLD AUTO: 74.6 % (ref 43–81)
PHOSPHATE SERPL-MCNC: 2.9 MG/DL (ref 2.5–4.9)
PLATELET # BLD AUTO: 341 /CMM (ref 150–450)
POTASSIUM SERPL-SCNC: 3.3 MMOL/L (ref 3.5–5.1)
RBC # BLD AUTO: 4.52 MIL/UL (ref 4.5–6)
SODIUM SERPL-SCNC: 135 MMOL/L (ref 136–145)
WBC NRBC COR # BLD AUTO: 9.7 K/UL (ref 4.3–11)

## 2020-08-04 RX ADMIN — FOLIC ACID SCH MG: 1 TABLET ORAL at 08:41

## 2020-08-04 RX ADMIN — ESCITALOPRAM OXALATE SCH MG: 10 TABLET, FILM COATED ORAL at 08:41

## 2020-08-04 RX ADMIN — Medication SCH EACH: at 18:53

## 2020-08-04 RX ADMIN — LISINOPRIL SCH MG: 20 TABLET ORAL at 08:41

## 2020-08-04 RX ADMIN — MONTELUKAST SODIUM SCH MG: 10 TABLET, FILM COATED ORAL at 08:41

## 2020-08-04 RX ADMIN — INSULIN HUMAN PRN UNIT: 100 INJECTION, SOLUTION PARENTERAL at 18:53

## 2020-08-04 RX ADMIN — Medication SCH EACH: at 12:02

## 2020-08-04 RX ADMIN — INSULIN HUMAN PRN UNIT: 100 INJECTION, SOLUTION PARENTERAL at 21:39

## 2020-08-04 RX ADMIN — DEXTROSE MONOHYDRATE SCH MLS/HR: 50 INJECTION, SOLUTION INTRAVENOUS at 20:09

## 2020-08-04 RX ADMIN — ATORVASTATIN CALCIUM SCH MG: 10 TABLET, FILM COATED ORAL at 21:32

## 2020-08-04 RX ADMIN — DEXTROSE MONOHYDRATE SCH MLS/HR: 50 INJECTION, SOLUTION INTRAVENOUS at 08:24

## 2020-08-04 RX ADMIN — TAMSULOSIN HYDROCHLORIDE SCH MG: 0.4 CAPSULE ORAL at 21:32

## 2020-08-04 RX ADMIN — PANTOPRAZOLE SODIUM SCH MG: 40 TABLET, DELAYED RELEASE ORAL at 07:30

## 2020-08-04 RX ADMIN — TRAZODONE HYDROCHLORIDE SCH MG: 50 TABLET ORAL at 21:32

## 2020-08-04 RX ADMIN — Medication SCH EACH: at 06:32

## 2020-08-04 RX ADMIN — FERROUS SULFATE TAB 325 MG (65 MG ELEMENTAL FE) SCH MG: 325 (65 FE) TAB at 08:41

## 2020-08-04 RX ADMIN — Medication SCH EACH: at 21:45

## 2020-08-04 RX ADMIN — Medication SCH MG: at 09:32

## 2020-08-04 RX ADMIN — LINAGLIPTIN SCH MG: 5 TABLET, FILM COATED ORAL at 08:39

## 2020-08-04 RX ADMIN — HALOPERIDOL SCH MG: 1 TABLET ORAL at 08:41

## 2020-08-04 RX ADMIN — INSULIN HUMAN PRN UNIT: 100 INJECTION, SOLUTION PARENTERAL at 12:07

## 2020-08-04 RX ADMIN — HALOPERIDOL SCH MG: 1 TABLET ORAL at 18:51

## 2020-08-04 NOTE — NUR
MS RN CLOSING NOTES 



PATIENT RESTING IN BED A/O X 2-3. ON 2L OF O2 TOLERATING WELL. NO SIGNS OF ACUTE DISTRESS. 
NO COMPLAINTS OF PAIN OR DISCOMFORT. IV LOCATED ON L FA #22. SAFETY PRECAUTIONS IN PLACE 
WITH BED IN LOWEST POSITION, CALL LIGHT WITHIN REACH, BREAKS ON, SIDE RAILS UP. ALL NEEDS 
ATTENDED TO, PATIENT KEPT CLEAN AND DRY THROUGHOUT THE NIGHT. WILL ENDORSE TO ONCOMING SHIFT 
ABOUT WARREN.

## 2020-08-05 VITALS — SYSTOLIC BLOOD PRESSURE: 131 MMHG | DIASTOLIC BLOOD PRESSURE: 70 MMHG

## 2020-08-05 VITALS — SYSTOLIC BLOOD PRESSURE: 129 MMHG | DIASTOLIC BLOOD PRESSURE: 69 MMHG

## 2020-08-05 VITALS — SYSTOLIC BLOOD PRESSURE: 102 MMHG | DIASTOLIC BLOOD PRESSURE: 69 MMHG

## 2020-08-05 VITALS — SYSTOLIC BLOOD PRESSURE: 131 MMHG | DIASTOLIC BLOOD PRESSURE: 80 MMHG

## 2020-08-05 VITALS — DIASTOLIC BLOOD PRESSURE: 71 MMHG | SYSTOLIC BLOOD PRESSURE: 134 MMHG

## 2020-08-05 VITALS — DIASTOLIC BLOOD PRESSURE: 92 MMHG | SYSTOLIC BLOOD PRESSURE: 136 MMHG

## 2020-08-05 VITALS — SYSTOLIC BLOOD PRESSURE: 115 MMHG | DIASTOLIC BLOOD PRESSURE: 71 MMHG

## 2020-08-05 VITALS — DIASTOLIC BLOOD PRESSURE: 66 MMHG | SYSTOLIC BLOOD PRESSURE: 108 MMHG

## 2020-08-05 LAB
BUN SERPL-MCNC: 17 MG/DL (ref 7–18)
CALCIUM SERPL-MCNC: 8.1 MG/DL (ref 8.5–10.1)
CHLORIDE SERPL-SCNC: 103 MMOL/L (ref 98–107)
CO2 SERPL-SCNC: 28 MMOL/L (ref 21–32)
CREAT SERPL-MCNC: 1.1 MG/DL (ref 0.6–1.3)
GLUCOSE SERPL-MCNC: 161 MG/DL (ref 74–106)
POTASSIUM SERPL-SCNC: 3.5 MMOL/L (ref 3.5–5.1)
SODIUM SERPL-SCNC: 136 MMOL/L (ref 136–145)

## 2020-08-05 PROCEDURE — 0SBC4ZZ EXCISION OF RIGHT KNEE JOINT, PERCUTANEOUS ENDOSCOPIC APPROACH: ICD-10-PCS | Performed by: SPECIALIST

## 2020-08-05 PROCEDURE — 3E1U48Z IRRIGATION OF JOINTS USING IRRIGATING SUBSTANCE, PERCUTANEOUS ENDOSCOPIC APPROACH: ICD-10-PCS | Performed by: SPECIALIST

## 2020-08-05 RX ADMIN — Medication SCH EACH: at 17:22

## 2020-08-05 RX ADMIN — TRAZODONE HYDROCHLORIDE SCH MG: 50 TABLET ORAL at 21:28

## 2020-08-05 RX ADMIN — Medication SCH MG: at 08:23

## 2020-08-05 RX ADMIN — FERROUS SULFATE TAB 325 MG (65 MG ELEMENTAL FE) SCH MG: 325 (65 FE) TAB at 08:22

## 2020-08-05 RX ADMIN — MONTELUKAST SODIUM SCH MG: 10 TABLET, FILM COATED ORAL at 08:23

## 2020-08-05 RX ADMIN — Medication SCH EACH: at 12:05

## 2020-08-05 RX ADMIN — ATORVASTATIN CALCIUM SCH MG: 10 TABLET, FILM COATED ORAL at 21:28

## 2020-08-05 RX ADMIN — PANTOPRAZOLE SODIUM SCH MG: 40 TABLET, DELAYED RELEASE ORAL at 08:07

## 2020-08-05 RX ADMIN — INSULIN HUMAN PRN UNIT: 100 INJECTION, SOLUTION PARENTERAL at 06:35

## 2020-08-05 RX ADMIN — INSULIN HUMAN PRN UNIT: 100 INJECTION, SOLUTION PARENTERAL at 21:45

## 2020-08-05 RX ADMIN — DEXTROSE MONOHYDRATE SCH MLS/HR: 50 INJECTION, SOLUTION INTRAVENOUS at 08:07

## 2020-08-05 RX ADMIN — DEXTROSE MONOHYDRATE SCH MLS/HR: 50 INJECTION, SOLUTION INTRAVENOUS at 20:00

## 2020-08-05 RX ADMIN — TAMSULOSIN HYDROCHLORIDE SCH MG: 0.4 CAPSULE ORAL at 21:28

## 2020-08-05 RX ADMIN — ESCITALOPRAM OXALATE SCH MG: 10 TABLET, FILM COATED ORAL at 08:23

## 2020-08-05 RX ADMIN — Medication SCH EACH: at 06:35

## 2020-08-05 RX ADMIN — HALOPERIDOL SCH MG: 1 TABLET ORAL at 08:23

## 2020-08-05 RX ADMIN — LINAGLIPTIN SCH MG: 5 TABLET, FILM COATED ORAL at 09:00

## 2020-08-05 RX ADMIN — FOLIC ACID SCH MG: 1 TABLET ORAL at 08:23

## 2020-08-05 RX ADMIN — Medication SCH EACH: at 21:40

## 2020-08-05 RX ADMIN — HALOPERIDOL SCH MG: 1 TABLET ORAL at 17:00

## 2020-08-05 RX ADMIN — LISINOPRIL SCH MG: 20 TABLET ORAL at 08:23

## 2020-08-05 RX ADMIN — Medication PRN EACH: at 08:26

## 2020-08-05 NOTE — NUR
MS RN notes

Informed and notified Armin Edwards regarding potassium 20 meq/2 tabs/po/once. Pt 
potassium in am was 3.5. Per OR nurse RADHA Nazario Pt received potassium 10 meq IV. MD ordered to 
give potassium as ordered. Pt's is on tele with multiple PVC. Ordered carried out. Charge 
nurse is aware and informed. Will continue to monitor.

-------------------------------------------------------------------------------

Addendum: 08/05/20 at 2320 by YULIANA GEORGE RN

-------------------------------------------------------------------------------

Tele RN notes

## 2020-08-05 NOTE — NUR
RN medsurg notes

Did not administered Vanco 1 gm that is due at 2000. Per am nurse Katerina, OR nurse RADHA Nazario 
gave vanco at the OR. Verified with OR nurse RADHA Nazario. RADHA Nazario confirmed that Pt received 
vanco at the OR. Called and notified [facility] about vanco that Pt received in OR. 
Will continue to monitor.

-------------------------------------------------------------------------------

Addendum: 08/05/20 at 2321 by YULIANA GEORGE RN

-------------------------------------------------------------------------------

Tele RN notes

## 2020-08-05 NOTE — NUR
Tele RN opening notes

Received Pt from morning nurse. Pt just came from R knee arthroscopy washout with drain 
placement. Pt is laying in bed comfortably. Pt is alert and orientedX2-3. Respiration is 
normal. No SOB. No S/S of distress noted. VS is stable. R knee dressing is intact, clean and 
dry. R drainage is intact, patent and clean, no fluids noted.  IV sites at LFA# 22 is clean, 
intact and flush without resistance. Safety precautions is maintained. Contact precautions 
is maintained. Bed at low position, brakes locked, HOB elevated, off loaded, side railsupX2 
and call light is within reach. Will continue to monitor.

## 2020-08-05 NOTE — NUR
MS RN CLOSING NOTES 



PATIENT RESTING IN BED A/O X 2-3. ON RA OF O2 TOLERATING WELL. NO SIGNS OF ACUTE DISTRESS. 
NO COMPLAINTS OF PAIN OR DISCOMFORT. IV LOCATED ON L FA #22. SAFETY PRECAUTIONS IN PLACE 
WITH BED IN LOWEST POSITION, CALL LIGHT WITHIN REACH, BREAKS ON, SIDE RAILS UP. ALL NEEDS 
ATTENDED TO. PATIENT KEPT CLEAN AND DRY. WILL ENDORSE TO ONCOMING SHIFT ABOUT WARREN.

## 2020-08-05 NOTE — NUR
RECEIVED PT. THIS AM ALERT AND ORIENTED X2-3.NO COMPLAINTS OFFERED.DRESSING RT KNEE FROM 
YESTERDAY'S ASPIRATION DRY AND INTACT.

## 2020-08-06 VITALS — DIASTOLIC BLOOD PRESSURE: 70 MMHG | SYSTOLIC BLOOD PRESSURE: 125 MMHG

## 2020-08-06 VITALS — DIASTOLIC BLOOD PRESSURE: 60 MMHG | SYSTOLIC BLOOD PRESSURE: 122 MMHG

## 2020-08-06 VITALS — SYSTOLIC BLOOD PRESSURE: 117 MMHG | DIASTOLIC BLOOD PRESSURE: 65 MMHG

## 2020-08-06 VITALS — DIASTOLIC BLOOD PRESSURE: 68 MMHG | SYSTOLIC BLOOD PRESSURE: 126 MMHG

## 2020-08-06 VITALS — DIASTOLIC BLOOD PRESSURE: 57 MMHG | SYSTOLIC BLOOD PRESSURE: 119 MMHG

## 2020-08-06 VITALS — DIASTOLIC BLOOD PRESSURE: 50 MMHG | SYSTOLIC BLOOD PRESSURE: 97 MMHG

## 2020-08-06 VITALS — DIASTOLIC BLOOD PRESSURE: 57 MMHG | SYSTOLIC BLOOD PRESSURE: 118 MMHG

## 2020-08-06 LAB
BUN SERPL-MCNC: 24 MG/DL (ref 7–18)
CALCIUM SERPL-MCNC: 8.2 MG/DL (ref 8.5–10.1)
CHLORIDE SERPL-SCNC: 102 MMOL/L (ref 98–107)
CO2 SERPL-SCNC: 24 MMOL/L (ref 21–32)
CREAT SERPL-MCNC: 1.1 MG/DL (ref 0.6–1.3)
GLUCOSE SERPL-MCNC: 222 MG/DL (ref 74–106)
POTASSIUM SERPL-SCNC: 4.6 MMOL/L (ref 3.5–5.1)
SODIUM SERPL-SCNC: 134 MMOL/L (ref 136–145)

## 2020-08-06 PROCEDURE — B548ZZA ULTRASONOGRAPHY OF SUPERIOR VENA CAVA, GUIDANCE: ICD-10-PCS | Performed by: NURSE PRACTITIONER

## 2020-08-06 PROCEDURE — 02HV33Z INSERTION OF INFUSION DEVICE INTO SUPERIOR VENA CAVA, PERCUTANEOUS APPROACH: ICD-10-PCS | Performed by: NURSE PRACTITIONER

## 2020-08-06 RX ADMIN — FERROUS SULFATE TAB 325 MG (65 MG ELEMENTAL FE) SCH MG: 325 (65 FE) TAB at 08:21

## 2020-08-06 RX ADMIN — Medication SCH MG: at 08:21

## 2020-08-06 RX ADMIN — LISINOPRIL SCH MG: 20 TABLET ORAL at 08:37

## 2020-08-06 RX ADMIN — HALOPERIDOL SCH MG: 1 TABLET ORAL at 17:21

## 2020-08-06 RX ADMIN — Medication SCH EACH: at 06:36

## 2020-08-06 RX ADMIN — LINAGLIPTIN SCH MG: 5 TABLET, FILM COATED ORAL at 08:21

## 2020-08-06 RX ADMIN — Medication SCH EACH: at 17:21

## 2020-08-06 RX ADMIN — PANTOPRAZOLE SODIUM SCH MG: 40 TABLET, DELAYED RELEASE ORAL at 06:45

## 2020-08-06 RX ADMIN — HALOPERIDOL SCH MG: 1 TABLET ORAL at 08:21

## 2020-08-06 RX ADMIN — Medication SCH EACH: at 11:57

## 2020-08-06 RX ADMIN — ESCITALOPRAM OXALATE SCH MG: 10 TABLET, FILM COATED ORAL at 08:21

## 2020-08-06 RX ADMIN — INSULIN HUMAN PRN UNIT: 100 INJECTION, SOLUTION PARENTERAL at 11:58

## 2020-08-06 RX ADMIN — METOPROLOL TARTRATE SCH MG: 50 TABLET, FILM COATED ORAL at 17:26

## 2020-08-06 RX ADMIN — DEXTROSE MONOHYDRATE SCH MLS/HR: 50 INJECTION, SOLUTION INTRAVENOUS at 19:11

## 2020-08-06 RX ADMIN — MONTELUKAST SODIUM SCH MG: 10 TABLET, FILM COATED ORAL at 08:21

## 2020-08-06 RX ADMIN — Medication PRN EACH: at 18:14

## 2020-08-06 RX ADMIN — FOLIC ACID SCH MG: 1 TABLET ORAL at 08:21

## 2020-08-06 RX ADMIN — INSULIN HUMAN PRN UNIT: 100 INJECTION, SOLUTION PARENTERAL at 06:39

## 2020-08-06 RX ADMIN — DEXTROSE MONOHYDRATE SCH MLS/HR: 50 INJECTION, SOLUTION INTRAVENOUS at 08:20

## 2020-08-06 RX ADMIN — METOPROLOL TARTRATE SCH MG: 50 TABLET, FILM COATED ORAL at 12:06

## 2020-08-06 NOTE — NUR
TELE RN NOTES



PATIENT RECEIVED IN BED SLEEPING, EASILY AWAKEN BY NAME AND LIGHT TOUCH. ALERT AND ORIENTED 
X 2-3. ON ROOM AIR WITH NO RESPIRATORY DISTRESS AT THIS TIME, WITH EVEN NON-LABORED 
BREATHING, AND NO SOB NOTED. ON CARDIAC MONITOR MULTI-FOCAL PVC'S WITH RUN OF V-TACH, 80'S. 
PATIENT SKIN DRY AND DRESSING INTACT. IV ACCESS INTACT AND PATENT. CONTACT PRECAUTIONS IN 
PLACE. SAFETY PRECAUTIONS IMPLEMENTED WITH BED LOCKED, BED IN THE LOWEST POSITION, BED ALARM 
ON, BILATERAL SIDE RAILS UP, AND CALL LIGHT WITHIN EASY REACH OF PATIENT. WILL CONTINUE TO 
MONITOR PATIENT.

## 2020-08-06 NOTE — NUR
Tele RN closing notes

Pt is resting in bed comfortably. Pt is alert and orientedX2-3. Respiration is normal. No 
SOB. No S/S of distress noted. VS is stable. Afebrile. Routine meds were given as ordered. 
Tele monitor showed SR with R knee dressing is intact, clean and dry. R drainage is intact, 
patent, clean and dry.  IV sites at LFA# 22 is clean, intact and flush without resistance. 
Safety precautions is maintained. Contact precautions is maintained. Kept Pt clean, dry and 
comfortable. Wound care provided as ordered for L foot. All needs met and attended. Bed at 
low position, brakes locked, HOB elevated, off loaded, side railsupX2 and call light is 
within reach. Will endorse to morning nurse for WARREN. 

-------------------------------------------------------------------------------

Addendum: 08/06/20 at 0735 by YULIANA GEORGE RN

-------------------------------------------------------------------------------

Tele monitor showed SR with multiple PVS. R knee dressing is intact, clean and dry.

## 2020-08-06 NOTE — NUR
RN NOTES



PICC LINE PLACEMENT DONE, BY RADHA ANN. CONFIRMED PLACEMENT OF PICC LINE WITH CHEST X-RAY. 
WILL CONTINUE TO MONITOR PATIENT.

## 2020-08-06 NOTE — NUR
RN TELE OPENING NOTES

 RECEIVED PATIENT IN BED AWAKE ALERT AND ORIENTED X3, ABLE TO MAKE NEEDS KNOWN RESPIRATIONS 
EVEN AND UNLABORED WITH EQUAL RISE AND FALL OF CHEST, DENIES ANY PAIN OR DISCOMFORT AT THIS 
TIME, IV SITE TO LEFT FA INTACT AND PATENT, RIGHT UPPER ARM PICC LINE INTACT AND PATENT, NO 
REDNESS, NO INFILTRATION PRESENT DRESSING IS C/D/I. RIGHT LEG NOTED WITH ACE BANDAGE AND 
BRACE/IMMOBILIZER IN PLACE. DRESSING TO LEFT FOOT SITE INTACT AND CLEAN. CURRENTLY RECEIVING 
ANTIBIOTIC VANCOMYCIN AS ORDERED PRIOR TO DISCHARGE, PATIENT IS SCHEDULED FOR DISCHARGE 
PATIENT STATES CAREGIVER DEEDEE WILL PICK HIM UP. ORIENTED TO STAFF AND CALL LIGHT AND KEPT 
WITHIN REACH, AT THIS TIME REMAINS STABLE ALL NEEDS ATTENDED, WILL CONTINUE TO MONITOR, 

SPOKE TO DEEDEE DONN  WHOM SAID HE WILL BE HERE AT 9PM FOR , VS 
WNL.DENTURES ARE WITH PATIENT, ALL BELONGINGS AND DISCHARGE PAPERWORK WITH PATIENT.

## 2020-08-06 NOTE — NUR
RN NOTES



SPOKE WITH PATIENT'S CAREGIVER DONN MARK [phone]. INFORMED PATIENT DISCHARGING TODAY 
AFTER LAST ANTIBIOTIC DOSE OF VANCOMYCIN. CAREGIVER VERBALIZED UNDERSTANDING, AND PATIENT 
MADE AWARE AS WELL. WILL CONTINUE TO MONITOR PATIENT.

## 2020-08-06 NOTE — NUR
TELE RN NOTES



PATIENT IN BED AWAKE, ALERT AND ORIENTED X 2-3. ON ROOM AIR WITH NO RESPIRATORY DISTRESS AT 
THIS TIME, WITH EVEN NON-LABORED BREATHING, AND NO SOB NOTED. ON CARDIAC MONITOR MULTI-FOCAL 
PVC'S WITH RUN OF V-TACH, 80'S. PATIENT SKIN KEPT CLEAN, DRY, AND DRESSING INTACT. IV PICC 
LINE ACCESS INTACT AND PATENT. MET ALL OF PATIENT'S NEEDS. SAFETY PRECAUTIONS IMPLEMENTED 
WITH BED LOCKED, BED IN THE LOWEST POSITION, BED ALARM ON, BILATERAL SIDE RAILS UP, AND CALL 
LIGHT WITHIN EASY REACH OF PATIENT. WILL ENDORSE PLAN OF CARE TO UPCOMING NURSE.

## 2020-08-06 NOTE — NUR
RN NOTES



PATIENT COMPLAINING OF ACHING/SHARP PAIN ON RIGHT LEG. PROVIDED COMFORT MEASURES TO AND 
REQUESTING PAIN MEDICATION. ADMINISTERED PRN NORCO PO AS ORDERED. WILL CONTINUE TO MONITOR 
PATIENT.

## 2020-08-06 NOTE — NUR
RN DISCHARGE NOTES

 PATIENT IN STABLE CONDITION , CAREGIVER DEEDEE HERE FOR  WITH PERSONAL CAR. ALL 
BELONGINGS AND DISCHARGE PAPERWORK GIVEN TO PATIENT AND DEEDEE, LEFT FA IV SITE REMOVED, NO 
BLEEDING, PATIENT SAFELY WHEELED TO PERSONAL TRANSPORTATION AND SAFELY PLACED IN PRIVATE 
CAR. LEFT IN STABLE CONDITION. DOSE OF VANCOMYCIN COMPLETE.

## 2020-09-07 ENCOUNTER — HOSPITAL ENCOUNTER (INPATIENT)
Dept: HOSPITAL 54 - ER | Age: 70
LOS: 5 days | Discharge: NURSING, REHAB OR LONG-TERM CARE | DRG: 291 | End: 2020-09-12
Attending: NURSE PRACTITIONER | Admitting: INTERNAL MEDICINE
Payer: MEDICARE

## 2020-09-07 VITALS — WEIGHT: 182 LBS | HEIGHT: 67 IN | BODY MASS INDEX: 28.56 KG/M2

## 2020-09-07 VITALS — DIASTOLIC BLOOD PRESSURE: 79 MMHG | SYSTOLIC BLOOD PRESSURE: 120 MMHG

## 2020-09-07 DIAGNOSIS — I13.0: Primary | ICD-10-CM

## 2020-09-07 DIAGNOSIS — K74.60: ICD-10-CM

## 2020-09-07 DIAGNOSIS — Z88.2: ICD-10-CM

## 2020-09-07 DIAGNOSIS — I49.3: ICD-10-CM

## 2020-09-07 DIAGNOSIS — E78.5: ICD-10-CM

## 2020-09-07 DIAGNOSIS — F20.9: ICD-10-CM

## 2020-09-07 DIAGNOSIS — J45.909: ICD-10-CM

## 2020-09-07 DIAGNOSIS — D64.9: ICD-10-CM

## 2020-09-07 DIAGNOSIS — E86.1: ICD-10-CM

## 2020-09-07 DIAGNOSIS — Z79.84: ICD-10-CM

## 2020-09-07 DIAGNOSIS — L84: ICD-10-CM

## 2020-09-07 DIAGNOSIS — E86.9: ICD-10-CM

## 2020-09-07 DIAGNOSIS — I50.31: ICD-10-CM

## 2020-09-07 DIAGNOSIS — E11.22: ICD-10-CM

## 2020-09-07 DIAGNOSIS — N18.9: ICD-10-CM

## 2020-09-07 DIAGNOSIS — I69.354: ICD-10-CM

## 2020-09-07 DIAGNOSIS — I95.9: ICD-10-CM

## 2020-09-07 DIAGNOSIS — R00.1: ICD-10-CM

## 2020-09-07 DIAGNOSIS — Z87.891: ICD-10-CM

## 2020-09-07 DIAGNOSIS — I48.91: ICD-10-CM

## 2020-09-07 DIAGNOSIS — N17.0: ICD-10-CM

## 2020-09-07 DIAGNOSIS — F19.11: ICD-10-CM

## 2020-09-07 DIAGNOSIS — K76.6: ICD-10-CM

## 2020-09-07 LAB
ALBUMIN SERPL BCP-MCNC: 2.9 G/DL (ref 3.4–5)
ALP SERPL-CCNC: 114 U/L (ref 46–116)
ALT SERPL W P-5'-P-CCNC: 18 U/L (ref 12–78)
AST SERPL W P-5'-P-CCNC: 20 U/L (ref 15–37)
BASOPHILS # BLD AUTO: 0.1 /CMM (ref 0–0.2)
BASOPHILS NFR BLD AUTO: 0.7 % (ref 0–2)
BILIRUB DIRECT SERPL-MCNC: 0.1 MG/DL (ref 0–0.2)
BILIRUB SERPL-MCNC: 0.6 MG/DL (ref 0.2–1)
BUN SERPL-MCNC: 22 MG/DL (ref 7–18)
CALCIUM SERPL-MCNC: 8.4 MG/DL (ref 8.5–10.1)
CHLORIDE SERPL-SCNC: 109 MMOL/L (ref 98–107)
CO2 SERPL-SCNC: 25 MMOL/L (ref 21–32)
CREAT SERPL-MCNC: 1.1 MG/DL (ref 0.6–1.3)
EOSINOPHIL NFR BLD AUTO: 5.4 % (ref 0–6)
GLUCOSE SERPL-MCNC: 112 MG/DL (ref 74–106)
HCT VFR BLD AUTO: 36 % (ref 39–51)
HGB BLD-MCNC: 11.5 G/DL (ref 13.5–17.5)
LYMPHOCYTES NFR BLD AUTO: 1.3 /CMM (ref 0.8–4.8)
LYMPHOCYTES NFR BLD AUTO: 16.3 % (ref 20–44)
MCHC RBC AUTO-ENTMCNC: 32 G/DL (ref 31–36)
MCV RBC AUTO: 85 FL (ref 80–96)
MONOCYTES NFR BLD AUTO: 0.7 /CMM (ref 0.1–1.3)
MONOCYTES NFR BLD AUTO: 9.4 % (ref 2–12)
NEUTROPHILS # BLD AUTO: 5.3 /CMM (ref 1.8–8.9)
NEUTROPHILS NFR BLD AUTO: 68.2 % (ref 43–81)
NT-PROBNP SERPL-MCNC: 4954 PG/ML (ref 0–125)
PLATELET # BLD AUTO: 249 /CMM (ref 150–450)
POTASSIUM SERPL-SCNC: 4.1 MMOL/L (ref 3.5–5.1)
PROT SERPL-MCNC: 7.2 G/DL (ref 6.4–8.2)
RBC # BLD AUTO: 4.21 MIL/UL (ref 4.5–6)
SODIUM SERPL-SCNC: 142 MMOL/L (ref 136–145)
WBC NRBC COR # BLD AUTO: 7.8 K/UL (ref 4.3–11)

## 2020-09-07 PROCEDURE — A4216 STERILE WATER/SALINE, 10 ML: HCPCS

## 2020-09-07 PROCEDURE — G0378 HOSPITAL OBSERVATION PER HR: HCPCS

## 2020-09-07 RX ADMIN — Medication SCH EACH: at 22:15

## 2020-09-07 RX ADMIN — TRAZODONE HYDROCHLORIDE SCH MG: 50 TABLET ORAL at 22:10

## 2020-09-07 RX ADMIN — Medication PRN TAB: at 19:23

## 2020-09-07 RX ADMIN — ENOXAPARIN SODIUM SCH MG: 40 INJECTION SUBCUTANEOUS at 18:27

## 2020-09-07 RX ADMIN — ZOLPIDEM TARTRATE PRN MG: 5 TABLET, FILM COATED ORAL at 23:15

## 2020-09-07 RX ADMIN — ATORVASTATIN CALCIUM SCH MG: 10 TABLET, FILM COATED ORAL at 22:10

## 2020-09-07 RX ADMIN — FUROSEMIDE SCH MG: 10 INJECTION, SOLUTION INTRAMUSCULAR; INTRAVENOUS at 18:28

## 2020-09-07 RX ADMIN — Medication SCH EACH: at 18:28

## 2020-09-07 RX ADMIN — TAMSULOSIN HYDROCHLORIDE SCH MG: 0.4 CAPSULE ORAL at 22:10

## 2020-09-07 RX ADMIN — INSULIN HUMAN PRN UNIT: 100 INJECTION, SOLUTION PARENTERAL at 18:30

## 2020-09-07 NOTE — NUR
JENNIFER FROM [facility] C/O GENERALIZED WEAKNESS, LETHARGY AND SOB. ALSO 
C/O BRADYCARDIA AT 45BPM. TO ER BED 10, HOOKED TO MONITOR, CHANGED TO HOSP 
GOWN, WARM BLANKET PROVIDED, PATIENT AAO x 2, NOT IN RESPIRATORY DISTRESS. LAW 
AT BEDSIDE FOR EVAL. 

## 2020-09-07 NOTE — NUR
[facility] RN TELE NOTES

RECEIVED PT IN STRETCHER. PT IS ALERT AND ORIENTED X3. PT IS N/C 3L. PT RIGHT UPPER LINE 
PICC LINE IS FLUSHING WELL, INTACT AND NO INFILTRATION IS NOTED. SAFETY MEASUREMENTS ARE 
IMPLEMENTED. BED IS IN THE LOWEST POSITION. SIDE RAILS ARE UPX2. WILL ENDORSE TO NIGHTSHIFT 
NURSE.

-------------------------------------------------------------------------------

Addendum: 09/07/20 at 1929 by PEDRO TERAN RN

-------------------------------------------------------------------------------

[facility] CLOSING NOTES

PT IS RESTING. PT IS ALERT AND ORIENTED X3. PT IS N/C 3L. PT RIGHT UPPER LINE PICC LINE IS 
FLUSHING WELL, INTACT AND NO INFILTRATION IS NOTED. SAFETY MEASUREMENTS ARE IMPLEMENTED. BED 
IS IN THE LOWEST POSITION. SIDE RAILS ARE UPX2. WILL ENDORSE TO NIGHTSHIFT NURSE.

## 2020-09-07 NOTE — NUR
RN OPENING NOTE



PT RECEIVED IN THE BED. PT IS A/A/O X3. PT IS ON 3 L NC SATING 100%. NO SOB NOTED. PT HAS 
NAS PICC LINE FLUSHES WELL. SAFETY MEASURES IN PLACE SIDE RAILS UP X2, BED AT LOWEST 
POSITION, LOCKED, CALL LIGHT IN REACH.

-------------------------------------------------------------------------------

Addendum: 09/08/20 at 0008 by JIM LENNON RN

-------------------------------------------------------------------------------

MULTIPLE PVC NOTED WITH HR IN 80s.

## 2020-09-08 VITALS — DIASTOLIC BLOOD PRESSURE: 94 MMHG | SYSTOLIC BLOOD PRESSURE: 134 MMHG

## 2020-09-08 VITALS — SYSTOLIC BLOOD PRESSURE: 100 MMHG | DIASTOLIC BLOOD PRESSURE: 67 MMHG

## 2020-09-08 VITALS — SYSTOLIC BLOOD PRESSURE: 102 MMHG | DIASTOLIC BLOOD PRESSURE: 59 MMHG

## 2020-09-08 VITALS — SYSTOLIC BLOOD PRESSURE: 145 MMHG | DIASTOLIC BLOOD PRESSURE: 71 MMHG

## 2020-09-08 VITALS — SYSTOLIC BLOOD PRESSURE: 128 MMHG | DIASTOLIC BLOOD PRESSURE: 66 MMHG

## 2020-09-08 VITALS — SYSTOLIC BLOOD PRESSURE: 140 MMHG | DIASTOLIC BLOOD PRESSURE: 70 MMHG

## 2020-09-08 VITALS — SYSTOLIC BLOOD PRESSURE: 106 MMHG | DIASTOLIC BLOOD PRESSURE: 61 MMHG

## 2020-09-08 VITALS — SYSTOLIC BLOOD PRESSURE: 104 MMHG | DIASTOLIC BLOOD PRESSURE: 57 MMHG

## 2020-09-08 LAB
BASOPHILS # BLD AUTO: 0 /CMM (ref 0–0.2)
BASOPHILS NFR BLD AUTO: 0.7 % (ref 0–2)
BUN SERPL-MCNC: 23 MG/DL (ref 7–18)
CALCIUM SERPL-MCNC: 8.4 MG/DL (ref 8.5–10.1)
CHLORIDE SERPL-SCNC: 105 MMOL/L (ref 98–107)
CHOLEST SERPL-MCNC: 94 MG/DL (ref ?–200)
CO2 SERPL-SCNC: 25 MMOL/L (ref 21–32)
CREAT SERPL-MCNC: 1.2 MG/DL (ref 0.6–1.3)
EOSINOPHIL NFR BLD AUTO: 2.1 % (ref 0–6)
GLUCOSE SERPL-MCNC: 127 MG/DL (ref 74–106)
HCT VFR BLD AUTO: 38 % (ref 39–51)
HDLC SERPL-MCNC: 36 MG/DL (ref 40–60)
HGB BLD-MCNC: 12.3 G/DL (ref 13.5–17.5)
LDLC SERPL DIRECT ASSAY-MCNC: 44 MG/DL (ref 0–99)
LYMPHOCYTES NFR BLD AUTO: 1.4 /CMM (ref 0.8–4.8)
LYMPHOCYTES NFR BLD AUTO: 22 % (ref 20–44)
MAGNESIUM SERPL-MCNC: 1.9 MG/DL (ref 1.8–2.4)
MCHC RBC AUTO-ENTMCNC: 33 G/DL (ref 31–36)
MCV RBC AUTO: 83 FL (ref 80–96)
MONOCYTES NFR BLD AUTO: 0.2 /CMM (ref 0.1–1.3)
MONOCYTES NFR BLD AUTO: 2.6 % (ref 2–12)
NEUTROPHILS # BLD AUTO: 4.6 /CMM (ref 1.8–8.9)
NEUTROPHILS NFR BLD AUTO: 72.6 % (ref 43–81)
PHOSPHATE SERPL-MCNC: 3.6 MG/DL (ref 2.5–4.9)
PLATELET # BLD AUTO: 286 /CMM (ref 150–450)
POTASSIUM SERPL-SCNC: 3.5 MMOL/L (ref 3.5–5.1)
RBC # BLD AUTO: 4.55 MIL/UL (ref 4.5–6)
SODIUM SERPL-SCNC: 142 MMOL/L (ref 136–145)
TRIGL SERPL-MCNC: 109 MG/DL (ref 30–150)
TSH SERPL DL<=0.005 MIU/L-ACNC: 1.12 UIU/ML (ref 0.36–3.74)
WBC NRBC COR # BLD AUTO: 6.3 K/UL (ref 4.3–11)

## 2020-09-08 RX ADMIN — FUROSEMIDE SCH MG: 10 INJECTION, SOLUTION INTRAMUSCULAR; INTRAVENOUS at 17:13

## 2020-09-08 RX ADMIN — Medication SCH EACH: at 22:34

## 2020-09-08 RX ADMIN — TAMSULOSIN HYDROCHLORIDE SCH MG: 0.4 CAPSULE ORAL at 22:34

## 2020-09-08 RX ADMIN — Medication SCH EACH: at 07:52

## 2020-09-08 RX ADMIN — ATORVASTATIN CALCIUM SCH MG: 10 TABLET, FILM COATED ORAL at 22:00

## 2020-09-08 RX ADMIN — FERROUS SULFATE TAB 325 MG (65 MG ELEMENTAL FE) SCH MG: 325 (65 FE) TAB at 08:48

## 2020-09-08 RX ADMIN — ESCITALOPRAM OXALATE SCH MG: 10 TABLET, FILM COATED ORAL at 08:51

## 2020-09-08 RX ADMIN — TRAZODONE HYDROCHLORIDE SCH MG: 50 TABLET ORAL at 22:34

## 2020-09-08 RX ADMIN — FUROSEMIDE SCH MG: 10 INJECTION, SOLUTION INTRAMUSCULAR; INTRAVENOUS at 12:27

## 2020-09-08 RX ADMIN — Medication SCH EACH: at 12:24

## 2020-09-08 RX ADMIN — MONTELUKAST SODIUM SCH MG: 10 TABLET, FILM COATED ORAL at 08:48

## 2020-09-08 RX ADMIN — FUROSEMIDE SCH MG: 10 INJECTION, SOLUTION INTRAMUSCULAR; INTRAVENOUS at 08:46

## 2020-09-08 RX ADMIN — Medication PRN TAB: at 16:25

## 2020-09-08 RX ADMIN — POTASSIUM CHLORIDE SCH MEQ: 1500 TABLET, EXTENDED RELEASE ORAL at 12:27

## 2020-09-08 RX ADMIN — LISINOPRIL SCH MG: 20 TABLET ORAL at 08:47

## 2020-09-08 RX ADMIN — Medication SCH EACH: at 17:14

## 2020-09-08 RX ADMIN — INSULIN HUMAN PRN UNIT: 100 INJECTION, SOLUTION PARENTERAL at 12:30

## 2020-09-08 RX ADMIN — POTASSIUM CHLORIDE SCH MEQ: 1500 TABLET, EXTENDED RELEASE ORAL at 11:02

## 2020-09-08 RX ADMIN — POTASSIUM CHLORIDE SCH MEQ: 1500 TABLET, EXTENDED RELEASE ORAL at 10:35

## 2020-09-08 RX ADMIN — LINAGLIPTIN SCH MG: 5 TABLET, FILM COATED ORAL at 08:46

## 2020-09-08 RX ADMIN — ENOXAPARIN SODIUM SCH MG: 40 INJECTION SUBCUTANEOUS at 22:34

## 2020-09-08 NOTE — NUR
STILL AWAITS NONCOVID ROOM FROM NURSING SUP.MD MADE AWARE OF KNEE DRAIN/IMMOBILIZER ,PER MD 
WILL REEVALUATE IN AM IF NEED ORTHO,NEW ORDERS OBTAINED AND CARRIED OUT.

## 2020-09-08 NOTE — NUR
MS RN

RECEIVED A NEW TRANSFER PATIENT, FROM [facility], AWAKE,ALERT,ORIENTED X3,NOT IN ANY FOR OF 
DISTRESS, RESPIRATION EVEN AND UNLABORED,NO SOB NOTED, SR W/ PVC ON MONITOR, INTACT HEMOVAC 
AT RIGHT LEG, NO OUTPUT AT THIS TIME, DENIES PAIN ,WILL MONITOR PATIENT'S, CONDITION .

## 2020-09-08 NOTE — NUR
tele rn opening note 



received patient in bed. a/ox3. on oxygen 2l/min via nasal cannula. respirations are even 
and unlabored. no s/s sob noted. no c/o pain at this time. external tele monitor reads sinus 
rhythm with BBB with bigeminy with trigeminy hr 97. in no apparent distress. iv access in 
NAS PICC line patent and saline locked. wound vac located in right knee. bed is low and 
locked, hob elevated in semi fowlers, side rials up x2, call light within reach. will 
continue to monitor.

## 2020-09-08 NOTE — NUR
Left message to caregiver Speedy Mcleod [phone] requesting call 

back to case mgt to discuss dc planning needs. Per report, patient resides 

at home with his caregiver. He was ambulating with a walker and requires 

mod assist with adl's. Was on service with [employer] homehealth [phone]. Current dc plan 
is to return home. 




-------------------------------------------------------------------------------

Addendum: 09/08/20 at 2109 by ISAAC MCMILLAN RN

-------------------------------------------------------------------------------

Amended: Links added.

## 2020-09-09 VITALS — SYSTOLIC BLOOD PRESSURE: 118 MMHG | DIASTOLIC BLOOD PRESSURE: 67 MMHG

## 2020-09-09 VITALS — SYSTOLIC BLOOD PRESSURE: 128 MMHG | DIASTOLIC BLOOD PRESSURE: 77 MMHG

## 2020-09-09 VITALS — DIASTOLIC BLOOD PRESSURE: 72 MMHG | SYSTOLIC BLOOD PRESSURE: 116 MMHG

## 2020-09-09 VITALS — DIASTOLIC BLOOD PRESSURE: 61 MMHG | SYSTOLIC BLOOD PRESSURE: 114 MMHG

## 2020-09-09 VITALS — SYSTOLIC BLOOD PRESSURE: 99 MMHG | DIASTOLIC BLOOD PRESSURE: 58 MMHG

## 2020-09-09 VITALS — SYSTOLIC BLOOD PRESSURE: 130 MMHG | DIASTOLIC BLOOD PRESSURE: 71 MMHG

## 2020-09-09 VITALS — DIASTOLIC BLOOD PRESSURE: 61 MMHG | SYSTOLIC BLOOD PRESSURE: 110 MMHG

## 2020-09-09 VITALS — DIASTOLIC BLOOD PRESSURE: 67 MMHG | SYSTOLIC BLOOD PRESSURE: 125 MMHG

## 2020-09-09 VITALS — DIASTOLIC BLOOD PRESSURE: 68 MMHG | SYSTOLIC BLOOD PRESSURE: 117 MMHG

## 2020-09-09 VITALS — DIASTOLIC BLOOD PRESSURE: 63 MMHG | SYSTOLIC BLOOD PRESSURE: 94 MMHG

## 2020-09-09 VITALS — SYSTOLIC BLOOD PRESSURE: 115 MMHG | DIASTOLIC BLOOD PRESSURE: 81 MMHG

## 2020-09-09 VITALS — DIASTOLIC BLOOD PRESSURE: 73 MMHG | SYSTOLIC BLOOD PRESSURE: 108 MMHG

## 2020-09-09 VITALS — DIASTOLIC BLOOD PRESSURE: 39 MMHG | SYSTOLIC BLOOD PRESSURE: 62 MMHG

## 2020-09-09 VITALS — SYSTOLIC BLOOD PRESSURE: 90 MMHG | DIASTOLIC BLOOD PRESSURE: 57 MMHG

## 2020-09-09 VITALS — DIASTOLIC BLOOD PRESSURE: 83 MMHG | SYSTOLIC BLOOD PRESSURE: 115 MMHG

## 2020-09-09 VITALS — SYSTOLIC BLOOD PRESSURE: 96 MMHG | DIASTOLIC BLOOD PRESSURE: 46 MMHG

## 2020-09-09 VITALS — DIASTOLIC BLOOD PRESSURE: 40 MMHG | SYSTOLIC BLOOD PRESSURE: 60 MMHG

## 2020-09-09 VITALS — DIASTOLIC BLOOD PRESSURE: 53 MMHG | SYSTOLIC BLOOD PRESSURE: 104 MMHG

## 2020-09-09 VITALS — DIASTOLIC BLOOD PRESSURE: 73 MMHG | SYSTOLIC BLOOD PRESSURE: 112 MMHG

## 2020-09-09 VITALS — DIASTOLIC BLOOD PRESSURE: 69 MMHG | SYSTOLIC BLOOD PRESSURE: 117 MMHG

## 2020-09-09 VITALS — DIASTOLIC BLOOD PRESSURE: 57 MMHG | SYSTOLIC BLOOD PRESSURE: 111 MMHG

## 2020-09-09 VITALS — DIASTOLIC BLOOD PRESSURE: 61 MMHG | SYSTOLIC BLOOD PRESSURE: 128 MMHG

## 2020-09-09 VITALS — DIASTOLIC BLOOD PRESSURE: 43 MMHG | SYSTOLIC BLOOD PRESSURE: 123 MMHG

## 2020-09-09 VITALS — DIASTOLIC BLOOD PRESSURE: 59 MMHG | SYSTOLIC BLOOD PRESSURE: 122 MMHG

## 2020-09-09 VITALS — DIASTOLIC BLOOD PRESSURE: 38 MMHG | SYSTOLIC BLOOD PRESSURE: 62 MMHG

## 2020-09-09 VITALS — SYSTOLIC BLOOD PRESSURE: 76 MMHG | DIASTOLIC BLOOD PRESSURE: 47 MMHG

## 2020-09-09 VITALS — DIASTOLIC BLOOD PRESSURE: 43 MMHG | SYSTOLIC BLOOD PRESSURE: 117 MMHG

## 2020-09-09 VITALS — DIASTOLIC BLOOD PRESSURE: 62 MMHG | SYSTOLIC BLOOD PRESSURE: 102 MMHG

## 2020-09-09 VITALS — SYSTOLIC BLOOD PRESSURE: 95 MMHG | DIASTOLIC BLOOD PRESSURE: 51 MMHG

## 2020-09-09 VITALS — DIASTOLIC BLOOD PRESSURE: 71 MMHG | SYSTOLIC BLOOD PRESSURE: 132 MMHG

## 2020-09-09 VITALS — DIASTOLIC BLOOD PRESSURE: 51 MMHG | SYSTOLIC BLOOD PRESSURE: 117 MMHG

## 2020-09-09 LAB
ALBUMIN SERPL BCP-MCNC: 3 G/DL (ref 3.4–5)
ALP SERPL-CCNC: 109 U/L (ref 46–116)
ALT SERPL W P-5'-P-CCNC: 22 U/L (ref 12–78)
AST SERPL W P-5'-P-CCNC: 21 U/L (ref 15–37)
BASOPHILS # BLD AUTO: 0 /CMM (ref 0–0.2)
BASOPHILS NFR BLD AUTO: 0.6 % (ref 0–2)
BILIRUB SERPL-MCNC: 1 MG/DL (ref 0.2–1)
BUN SERPL-MCNC: 32 MG/DL (ref 7–18)
BUN SERPL-MCNC: 41 MG/DL (ref 7–18)
CALCIUM SERPL-MCNC: 8.7 MG/DL (ref 8.5–10.1)
CALCIUM SERPL-MCNC: 8.8 MG/DL (ref 8.5–10.1)
CHLORIDE SERPL-SCNC: 103 MMOL/L (ref 98–107)
CHLORIDE SERPL-SCNC: 104 MMOL/L (ref 98–107)
CO2 SERPL-SCNC: 26 MMOL/L (ref 21–32)
CO2 SERPL-SCNC: 27 MMOL/L (ref 21–32)
CREAT SERPL-MCNC: 1.5 MG/DL (ref 0.6–1.3)
CREAT SERPL-MCNC: 1.9 MG/DL (ref 0.6–1.3)
EOSINOPHIL NFR BLD AUTO: 4.6 % (ref 0–6)
GLUCOSE SERPL-MCNC: 114 MG/DL (ref 74–106)
GLUCOSE SERPL-MCNC: 128 MG/DL (ref 74–106)
HCT VFR BLD AUTO: 39 % (ref 39–51)
HGB BLD-MCNC: 12.8 G/DL (ref 13.5–17.5)
LYMPHOCYTES NFR BLD AUTO: 1.8 /CMM (ref 0.8–4.8)
LYMPHOCYTES NFR BLD AUTO: 23.2 % (ref 20–44)
MAGNESIUM SERPL-MCNC: 1.8 MG/DL (ref 1.8–2.4)
MCHC RBC AUTO-ENTMCNC: 33 G/DL (ref 31–36)
MCV RBC AUTO: 82 FL (ref 80–96)
MONOCYTES NFR BLD AUTO: 0.9 /CMM (ref 0.1–1.3)
MONOCYTES NFR BLD AUTO: 11.7 % (ref 2–12)
NEUTROPHILS # BLD AUTO: 4.7 /CMM (ref 1.8–8.9)
NEUTROPHILS NFR BLD AUTO: 59.9 % (ref 43–81)
PHOSPHATE SERPL-MCNC: 3.9 MG/DL (ref 2.5–4.9)
PLATELET # BLD AUTO: 326 /CMM (ref 150–450)
POTASSIUM SERPL-SCNC: 3.8 MMOL/L (ref 3.5–5.1)
POTASSIUM SERPL-SCNC: 5 MMOL/L (ref 3.5–5.1)
PROT SERPL-MCNC: 7.7 G/DL (ref 6.4–8.2)
RBC # BLD AUTO: 4.76 MIL/UL (ref 4.5–6)
SODIUM SERPL-SCNC: 140 MMOL/L (ref 136–145)
SODIUM SERPL-SCNC: 140 MMOL/L (ref 136–145)
WBC NRBC COR # BLD AUTO: 7.9 K/UL (ref 4.3–11)

## 2020-09-09 RX ADMIN — Medication SCH EACH: at 18:00

## 2020-09-09 RX ADMIN — FERROUS SULFATE TAB 325 MG (65 MG ELEMENTAL FE) SCH MG: 325 (65 FE) TAB at 09:02

## 2020-09-09 RX ADMIN — POTASSIUM CHLORIDE SCH MEQ: 1500 TABLET, EXTENDED RELEASE ORAL at 11:35

## 2020-09-09 RX ADMIN — Medication SCH EACH: at 11:45

## 2020-09-09 RX ADMIN — LISINOPRIL SCH MG: 20 TABLET ORAL at 09:00

## 2020-09-09 RX ADMIN — SODIUM CHLORIDE SCH MG: 9 INJECTION, SOLUTION INTRAVENOUS at 18:00

## 2020-09-09 RX ADMIN — LINAGLIPTIN SCH MG: 5 TABLET, FILM COATED ORAL at 09:02

## 2020-09-09 RX ADMIN — SODIUM CHLORIDE SCH MG: 9 INJECTION, SOLUTION INTRAVENOUS at 17:34

## 2020-09-09 RX ADMIN — MONTELUKAST SODIUM SCH MG: 10 TABLET, FILM COATED ORAL at 09:02

## 2020-09-09 RX ADMIN — TAMSULOSIN HYDROCHLORIDE SCH MG: 0.4 CAPSULE ORAL at 21:12

## 2020-09-09 RX ADMIN — ATORVASTATIN CALCIUM SCH MG: 10 TABLET, FILM COATED ORAL at 21:12

## 2020-09-09 RX ADMIN — ESCITALOPRAM OXALATE SCH MG: 10 TABLET, FILM COATED ORAL at 09:02

## 2020-09-09 RX ADMIN — ENOXAPARIN SODIUM SCH MG: 40 INJECTION SUBCUTANEOUS at 21:12

## 2020-09-09 RX ADMIN — POTASSIUM CHLORIDE SCH MEQ: 1500 TABLET, EXTENDED RELEASE ORAL at 12:40

## 2020-09-09 RX ADMIN — Medication SCH EACH: at 21:19

## 2020-09-09 RX ADMIN — SODIUM CHLORIDE SCH MG: 9 INJECTION, SOLUTION INTRAVENOUS at 10:00

## 2020-09-09 RX ADMIN — Medication SCH EACH: at 06:39

## 2020-09-09 RX ADMIN — TRAZODONE HYDROCHLORIDE SCH MG: 50 TABLET ORAL at 21:12

## 2020-09-09 RX ADMIN — SODIUM CHLORIDE SCH MG: 9 INJECTION, SOLUTION INTRAVENOUS at 14:00

## 2020-09-09 RX ADMIN — ZOLPIDEM TARTRATE PRN MG: 5 TABLET, FILM COATED ORAL at 00:53

## 2020-09-09 NOTE — NUR
ICU RN. INITIAL ASSESSMENT. RECEIVED THE PT REST ON THE BED. AWAKE, ALERT FOLLOW COMMANDS. 
CARDIAC MONITOR SHOWING NSR. IV RT UPPER ARM PICC LINE. SALINE LOCK. OXYGEN 2L VIA NASAL 
CANNULA. SAT 98^. NO ACUTE DISTRESS NOTED. WILL CONTINUE TO MONITOR VITALS.

## 2020-09-09 NOTE — NUR
TELE RN NOTES



INFORMED HOSPITALIST RAQUEL KOENIG, ABOUT PATIENT'S DECREASED BLOOD PRESSURE. INFORMED TO 
HOLD LASIX DOSE AND TO CONTINUE TO MONITOR PATIENT'S VITALS. WILL CARRY OUT AND FOLLOW 
ORDERS.

## 2020-09-09 NOTE — NUR
tele rn note 



administered prn Ambien 5mg per patient request for sleep. bed alarm on. will continue to 
monitor.

## 2020-09-09 NOTE — NUR
PATIENT'S BEDSIDE MONITOR IS SHOWING MULTIPLE PVC, BIGEMINAL, TRIGEMINAL COUPLETS, CONTACTED 
DR WOODRUFF TO INFORM HIM AND ORDERED STAT EKG

## 2020-09-09 NOTE — NUR
RN CLOSING NOTE:



Patient remains in bed. Awake, alert and oriented x4. On cont. 02 via NC @ 2lpm. Labored 
breathing and some shortness of breath noted at times. No pain reported by patient. Endorsed 
by RADHA Bains to hold following doses of Lasix per Dr. Borrego. Strict I & O on patient. Tele 
monitor showing sinus rhythm in the 90s. Spoke with sister and her desire to have patient be 
discharged to somewhere close to her, informed her that Case Management will deal with those 
matters and will inform them. Unable to reach Case Management and left Voicemail so they can 
f/u when available. Call light in reach. Bed locked, low and at semi-glez's position. Side 
rails up x3. Safety ensured and observed. Due medications given. Treatment given as ordered. 
Endorsed to oncoming shift for WARREN.

## 2020-09-09 NOTE — NUR
RN NOTE:



Lasix dose was already prepared before RN reviewed nurse notes about Dr. Borrego ordering 
doses ordered to be held. Medication not given to patient and discarded accordingly and 
witnessed by RADHA Alexander.

## 2020-09-09 NOTE — NUR
TELE RN NOTES



TRANSFERRED PATIENT TO ICU, PATIENT WAS ALERT AND ORIENTED AND RESPONSIVE WITH NAME AND 
LIGHT TOUCH. TOLERATING NASAL CANNULA AT 2 LITERS. IV ACCESS INTACT AND PATENT. DENIES ANY 
PAIN AT THIS TIME. SPOKE WITH JULIUS GARCIA AND REPORT GIVEN.

## 2020-09-09 NOTE — NUR
SENT DR WOODRUFF THE 12 LEAD EKG RESULTS, PER MD, THAT IS PATIENT'S BASELINE. NNO AT THIS TIME. 
WILL CONTINUE TO MONITOR PATIENT FOR CHANGES.

## 2020-09-09 NOTE — NUR
TELE RN NOTES



INFORMED DR WOODRUFF ABOUT PATIENT'S RECENT BLOOD PRESSURE. INFORMED TO TRANSFER PATIENT. SPOKE 
WITH HOSPITALIST RAQUEL KOENIG ORDERED IV BOLUS 250mL. WILL CARRY OUT ORDERS AND TRANSFER 
PATIENT TO ICU.

## 2020-09-09 NOTE — NUR
TELE RN NOTES



PATIENT RECEIVED IN BED, EATING BREAKFAST SITING COMFORTABLY. ALERT AND ORIENTED X 3. ON 
NASAL CANNULA 2 LITERS, WITH NO SIGNS OF RESPIRATORY DISTRESS AT THIS TIME, WITH EVEN 
NON-LABORED BREATHING AND NO SOB NOTED. ON CARDIAC MONITOR, SINUS TACH 107 WITH RUN OFF 
PVC'S. PATIENT SKIN WARM AND DRY IV ACCESS INTACT AND PATENT. PATIENT PRESENTS WITH NO PAIN 
OR DISCOMFORT AT THIS TIME. SAFETY PRECAUTIONS IMPLEMENTED WITH BED LOCKED, BED IN THE 
LOWEST POSITION, BILATERAL SIDE RAILS UP, AND CALL LIGHT WITHIN EASY REACH OF PATIENT. WILL 
CONTINUE TO MONITOR PATIENT.

## 2020-09-09 NOTE — NUR
tele rn closing note 



patient in bed. a/ox3. remains on oxygen 2l/min via nasal cannula. respirations are even and 
unlabored. no resp distress noted. no c/o pain t/o shift. external tele monitor reads sinus 
rhythm to sinus tach with BBB with bigeminy with trigeminy. no distress. iv access 
maintained in NAS PICC line patent and saline locked. wound vac maintained in right knee. 
bed remains low and locked, hob elevated in semi fowlers, side rials up x2, call light 
within reach. will endorse to next shift.

## 2020-09-09 NOTE — NUR
RECEIVED REPORT FROM RADHA SERNA FOR CONTINUITY OF CARE. NO ACUTE DISTRESS NOTED AT THIS TIME. 
VSS. BP WNL, SATING WELL ON 2L/IN VIA NC. SAFETY MEASURES IMPLEMENTED, BED IN LOWEST 
POSITION, LOCKED, SIDE RAILS UP, CALL LIGHT WITHIN REACH. WILL CONTINUE TO MONITOR PATIENT 
FOR CHANGES.

## 2020-09-10 VITALS — SYSTOLIC BLOOD PRESSURE: 98 MMHG | DIASTOLIC BLOOD PRESSURE: 51 MMHG

## 2020-09-10 VITALS — DIASTOLIC BLOOD PRESSURE: 40 MMHG | SYSTOLIC BLOOD PRESSURE: 116 MMHG

## 2020-09-10 VITALS — SYSTOLIC BLOOD PRESSURE: 110 MMHG | DIASTOLIC BLOOD PRESSURE: 92 MMHG

## 2020-09-10 VITALS — DIASTOLIC BLOOD PRESSURE: 35 MMHG | SYSTOLIC BLOOD PRESSURE: 99 MMHG

## 2020-09-10 VITALS — SYSTOLIC BLOOD PRESSURE: 92 MMHG | DIASTOLIC BLOOD PRESSURE: 66 MMHG

## 2020-09-10 VITALS — SYSTOLIC BLOOD PRESSURE: 147 MMHG | DIASTOLIC BLOOD PRESSURE: 105 MMHG

## 2020-09-10 VITALS — DIASTOLIC BLOOD PRESSURE: 54 MMHG | SYSTOLIC BLOOD PRESSURE: 108 MMHG

## 2020-09-10 VITALS — DIASTOLIC BLOOD PRESSURE: 65 MMHG | SYSTOLIC BLOOD PRESSURE: 113 MMHG

## 2020-09-10 VITALS — SYSTOLIC BLOOD PRESSURE: 145 MMHG | DIASTOLIC BLOOD PRESSURE: 63 MMHG

## 2020-09-10 VITALS — SYSTOLIC BLOOD PRESSURE: 84 MMHG | DIASTOLIC BLOOD PRESSURE: 53 MMHG

## 2020-09-10 VITALS — SYSTOLIC BLOOD PRESSURE: 114 MMHG | DIASTOLIC BLOOD PRESSURE: 64 MMHG

## 2020-09-10 VITALS — SYSTOLIC BLOOD PRESSURE: 129 MMHG | DIASTOLIC BLOOD PRESSURE: 61 MMHG

## 2020-09-10 VITALS — SYSTOLIC BLOOD PRESSURE: 84 MMHG | DIASTOLIC BLOOD PRESSURE: 50 MMHG

## 2020-09-10 VITALS — DIASTOLIC BLOOD PRESSURE: 56 MMHG | SYSTOLIC BLOOD PRESSURE: 125 MMHG

## 2020-09-10 VITALS — DIASTOLIC BLOOD PRESSURE: 68 MMHG | SYSTOLIC BLOOD PRESSURE: 102 MMHG

## 2020-09-10 VITALS — DIASTOLIC BLOOD PRESSURE: 75 MMHG | SYSTOLIC BLOOD PRESSURE: 102 MMHG

## 2020-09-10 VITALS — SYSTOLIC BLOOD PRESSURE: 97 MMHG | DIASTOLIC BLOOD PRESSURE: 54 MMHG

## 2020-09-10 VITALS — SYSTOLIC BLOOD PRESSURE: 106 MMHG | DIASTOLIC BLOOD PRESSURE: 68 MMHG

## 2020-09-10 VITALS — DIASTOLIC BLOOD PRESSURE: 61 MMHG | SYSTOLIC BLOOD PRESSURE: 100 MMHG

## 2020-09-10 VITALS — SYSTOLIC BLOOD PRESSURE: 95 MMHG | DIASTOLIC BLOOD PRESSURE: 50 MMHG

## 2020-09-10 VITALS — DIASTOLIC BLOOD PRESSURE: 55 MMHG | SYSTOLIC BLOOD PRESSURE: 108 MMHG

## 2020-09-10 VITALS — SYSTOLIC BLOOD PRESSURE: 101 MMHG | DIASTOLIC BLOOD PRESSURE: 57 MMHG

## 2020-09-10 VITALS — SYSTOLIC BLOOD PRESSURE: 101 MMHG | DIASTOLIC BLOOD PRESSURE: 58 MMHG

## 2020-09-10 VITALS — DIASTOLIC BLOOD PRESSURE: 43 MMHG | SYSTOLIC BLOOD PRESSURE: 103 MMHG

## 2020-09-10 VITALS — SYSTOLIC BLOOD PRESSURE: 106 MMHG | DIASTOLIC BLOOD PRESSURE: 43 MMHG

## 2020-09-10 VITALS — SYSTOLIC BLOOD PRESSURE: 95 MMHG | DIASTOLIC BLOOD PRESSURE: 70 MMHG

## 2020-09-10 VITALS — DIASTOLIC BLOOD PRESSURE: 44 MMHG | SYSTOLIC BLOOD PRESSURE: 121 MMHG

## 2020-09-10 VITALS — DIASTOLIC BLOOD PRESSURE: 54 MMHG | SYSTOLIC BLOOD PRESSURE: 114 MMHG

## 2020-09-10 VITALS — DIASTOLIC BLOOD PRESSURE: 61 MMHG | SYSTOLIC BLOOD PRESSURE: 120 MMHG

## 2020-09-10 VITALS — DIASTOLIC BLOOD PRESSURE: 48 MMHG | SYSTOLIC BLOOD PRESSURE: 102 MMHG

## 2020-09-10 VITALS — DIASTOLIC BLOOD PRESSURE: 43 MMHG | SYSTOLIC BLOOD PRESSURE: 77 MMHG

## 2020-09-10 VITALS — SYSTOLIC BLOOD PRESSURE: 89 MMHG | DIASTOLIC BLOOD PRESSURE: 52 MMHG

## 2020-09-10 VITALS — SYSTOLIC BLOOD PRESSURE: 115 MMHG | DIASTOLIC BLOOD PRESSURE: 57 MMHG

## 2020-09-10 VITALS — DIASTOLIC BLOOD PRESSURE: 68 MMHG | SYSTOLIC BLOOD PRESSURE: 118 MMHG

## 2020-09-10 VITALS — SYSTOLIC BLOOD PRESSURE: 106 MMHG | DIASTOLIC BLOOD PRESSURE: 92 MMHG

## 2020-09-10 VITALS — SYSTOLIC BLOOD PRESSURE: 94 MMHG | DIASTOLIC BLOOD PRESSURE: 52 MMHG

## 2020-09-10 VITALS — DIASTOLIC BLOOD PRESSURE: 59 MMHG | SYSTOLIC BLOOD PRESSURE: 123 MMHG

## 2020-09-10 LAB
ALBUMIN SERPL BCP-MCNC: 3.2 G/DL (ref 3.4–5)
ALP SERPL-CCNC: 109 U/L (ref 46–116)
ALT SERPL W P-5'-P-CCNC: 24 U/L (ref 12–78)
AST SERPL W P-5'-P-CCNC: 26 U/L (ref 15–37)
BASE EXCESS BLDA CALC-SCNC: -2.3 MMOL/L
BASOPHILS # BLD AUTO: 0 /CMM (ref 0–0.2)
BASOPHILS NFR BLD AUTO: 0.5 % (ref 0–2)
BILIRUB SERPL-MCNC: 0.9 MG/DL (ref 0.2–1)
BUN SERPL-MCNC: 40 MG/DL (ref 7–18)
CALCIUM SERPL-MCNC: 9 MG/DL (ref 8.5–10.1)
CHLORIDE SERPL-SCNC: 105 MMOL/L (ref 98–107)
CO2 SERPL-SCNC: 26 MMOL/L (ref 21–32)
CREAT SERPL-MCNC: 1.6 MG/DL (ref 0.6–1.3)
DO-HGB MFR BLDA: 69.7 MMHG
EOSINOPHIL NFR BLD AUTO: 4.4 % (ref 0–6)
GLUCOSE SERPL-MCNC: 123 MG/DL (ref 74–106)
HCT VFR BLD AUTO: 40 % (ref 39–51)
HGB BLD-MCNC: 12.9 G/DL (ref 13.5–17.5)
INHALED O2 CONCENTRATION: 28 %
LYMPHOCYTES NFR BLD AUTO: 1.9 /CMM (ref 0.8–4.8)
LYMPHOCYTES NFR BLD AUTO: 21.6 % (ref 20–44)
MAGNESIUM SERPL-MCNC: 2 MG/DL (ref 1.8–2.4)
MCHC RBC AUTO-ENTMCNC: 33 G/DL (ref 31–36)
MCV RBC AUTO: 83 FL (ref 80–96)
MONOCYTES NFR BLD AUTO: 1.2 /CMM (ref 0.1–1.3)
MONOCYTES NFR BLD AUTO: 13.6 % (ref 2–12)
NEUTROPHILS # BLD AUTO: 5.2 /CMM (ref 1.8–8.9)
NEUTROPHILS NFR BLD AUTO: 59.9 % (ref 43–81)
PCO2 TEMP ADJ BLDA: 37.2 MMHG (ref 35–45)
PH TEMP ADJ BLDA: 7.39 [PH] (ref 7.35–7.45)
PHOSPHATE SERPL-MCNC: 4 MG/DL (ref 2.5–4.9)
PLATELET # BLD AUTO: 337 /CMM (ref 150–450)
PO2 TEMP ADJ BLDA: 86 MMHG (ref 75–100)
POTASSIUM SERPL-SCNC: 4.7 MMOL/L (ref 3.5–5.1)
PROT SERPL-MCNC: 7.9 G/DL (ref 6.4–8.2)
RBC # BLD AUTO: 4.79 MIL/UL (ref 4.5–6)
SAO2 % BLDA: 95.6 % (ref 92–98.5)
SODIUM SERPL-SCNC: 141 MMOL/L (ref 136–145)
VENTILATION MODE VENT: (no result)
WBC NRBC COR # BLD AUTO: 8.7 K/UL (ref 4.3–11)

## 2020-09-10 RX ADMIN — SODIUM CHLORIDE PRN MLS/HR: 9 INJECTION, SOLUTION INTRAVENOUS at 20:24

## 2020-09-10 RX ADMIN — ENOXAPARIN SODIUM SCH MG: 40 INJECTION SUBCUTANEOUS at 21:12

## 2020-09-10 RX ADMIN — Medication SCH EACH: at 07:42

## 2020-09-10 RX ADMIN — MUPIROCIN SCH GM: 20 OINTMENT TOPICAL at 21:11

## 2020-09-10 RX ADMIN — Medication SCH EACH: at 17:24

## 2020-09-10 RX ADMIN — TRAZODONE HYDROCHLORIDE SCH MG: 50 TABLET ORAL at 21:13

## 2020-09-10 RX ADMIN — Medication SCH EACH: at 11:40

## 2020-09-10 RX ADMIN — MONTELUKAST SODIUM SCH MG: 10 TABLET, FILM COATED ORAL at 08:38

## 2020-09-10 RX ADMIN — FERROUS SULFATE TAB 325 MG (65 MG ELEMENTAL FE) SCH MG: 325 (65 FE) TAB at 08:38

## 2020-09-10 RX ADMIN — LINAGLIPTIN SCH MG: 5 TABLET, FILM COATED ORAL at 08:38

## 2020-09-10 RX ADMIN — SODIUM CHLORIDE PRN MLS/HR: 9 INJECTION, SOLUTION INTRAVENOUS at 10:09

## 2020-09-10 RX ADMIN — Medication SCH EACH: at 21:24

## 2020-09-10 RX ADMIN — TAMSULOSIN HYDROCHLORIDE SCH MG: 0.4 CAPSULE ORAL at 21:13

## 2020-09-10 RX ADMIN — ATORVASTATIN CALCIUM SCH MG: 10 TABLET, FILM COATED ORAL at 21:13

## 2020-09-10 RX ADMIN — SODIUM CHLORIDE PRN MLS/HR: 9 INJECTION, SOLUTION INTRAVENOUS at 19:50

## 2020-09-10 RX ADMIN — ESCITALOPRAM OXALATE SCH MG: 10 TABLET, FILM COATED ORAL at 08:38

## 2020-09-10 RX ADMIN — LISINOPRIL SCH MG: 20 TABLET ORAL at 08:38

## 2020-09-10 NOTE — NUR
RN CLOSING NOTES



NO SIGNIFICANT CHANGE NOTED. PT REMAINS A/O. NO RESPIRATORY DISTRESS NOTED. NO SOB NOTED. 
DENIES ANY PAIN. KEPT COMFORTABLE. BLE ELEVATED. CALL LIGHT WITHIN REACH. WILL ENDORSE FOR 
CONTINUITY OF CARE.

## 2020-09-10 NOTE — NUR
Received patient A/OX4.VS stable.Respiration even and unlabored.With O2 2L NC sating 98%.

SR per monitor.Moves all extremities.Right Knee dressing C/D/I with immobilizer in 
place.Skin 

warm to touch,pulses palpable.Denies pain or sob.IVF infusing well to NAS PICC Line,site 
intact.

Continue monitoring.Safety precaution maintain with call light at bedside.Bed in low 
position and 

locked.Frequent rounding implemented.

## 2020-09-10 NOTE — NUR
RN INITIAL NOTES



RECEIVED PT AWAKE, A/0. ON 02 VIA NC AT 2LPM. HOB ELEVATED. NO RESPIRATORY DISTRESS NOTED. 
DENIES ANY PAIN. NAS PICC IN PLACE. DRESSING ON RIGHT KNEE CLEAN AND DRY. PT CONTINENT. BLE 
ELEVATED. CALL LIGHT WITHIN REACH. WILL MONITOR

## 2020-09-10 NOTE — NUR
icu rn. am care, done, oxygen 2l via nasal cannula, sat 98%. no acute distress noted. 
cardiac monitor showing nsr. iv rt upper arm picc line. saline lock. hob elevated, rt leg 
immobilizer intact. afebrile. hob elevated, will continue to monitor vitals.

## 2020-09-11 VITALS — SYSTOLIC BLOOD PRESSURE: 96 MMHG | DIASTOLIC BLOOD PRESSURE: 57 MMHG

## 2020-09-11 VITALS — SYSTOLIC BLOOD PRESSURE: 115 MMHG | DIASTOLIC BLOOD PRESSURE: 79 MMHG

## 2020-09-11 VITALS — SYSTOLIC BLOOD PRESSURE: 125 MMHG | DIASTOLIC BLOOD PRESSURE: 67 MMHG

## 2020-09-11 VITALS — DIASTOLIC BLOOD PRESSURE: 48 MMHG | SYSTOLIC BLOOD PRESSURE: 114 MMHG

## 2020-09-11 VITALS — SYSTOLIC BLOOD PRESSURE: 106 MMHG | DIASTOLIC BLOOD PRESSURE: 61 MMHG

## 2020-09-11 VITALS — DIASTOLIC BLOOD PRESSURE: 56 MMHG | SYSTOLIC BLOOD PRESSURE: 100 MMHG

## 2020-09-11 VITALS — SYSTOLIC BLOOD PRESSURE: 103 MMHG | DIASTOLIC BLOOD PRESSURE: 61 MMHG

## 2020-09-11 VITALS — DIASTOLIC BLOOD PRESSURE: 77 MMHG | SYSTOLIC BLOOD PRESSURE: 116 MMHG

## 2020-09-11 VITALS — DIASTOLIC BLOOD PRESSURE: 57 MMHG | SYSTOLIC BLOOD PRESSURE: 88 MMHG

## 2020-09-11 VITALS — SYSTOLIC BLOOD PRESSURE: 90 MMHG | DIASTOLIC BLOOD PRESSURE: 48 MMHG

## 2020-09-11 VITALS — DIASTOLIC BLOOD PRESSURE: 64 MMHG | SYSTOLIC BLOOD PRESSURE: 111 MMHG

## 2020-09-11 VITALS — DIASTOLIC BLOOD PRESSURE: 58 MMHG | SYSTOLIC BLOOD PRESSURE: 97 MMHG

## 2020-09-11 VITALS — DIASTOLIC BLOOD PRESSURE: 76 MMHG | SYSTOLIC BLOOD PRESSURE: 132 MMHG

## 2020-09-11 VITALS — DIASTOLIC BLOOD PRESSURE: 73 MMHG | SYSTOLIC BLOOD PRESSURE: 119 MMHG

## 2020-09-11 VITALS — SYSTOLIC BLOOD PRESSURE: 112 MMHG | DIASTOLIC BLOOD PRESSURE: 66 MMHG

## 2020-09-11 VITALS — SYSTOLIC BLOOD PRESSURE: 94 MMHG | DIASTOLIC BLOOD PRESSURE: 26 MMHG

## 2020-09-11 VITALS — SYSTOLIC BLOOD PRESSURE: 120 MMHG | DIASTOLIC BLOOD PRESSURE: 67 MMHG

## 2020-09-11 VITALS — SYSTOLIC BLOOD PRESSURE: 118 MMHG | DIASTOLIC BLOOD PRESSURE: 64 MMHG

## 2020-09-11 VITALS — SYSTOLIC BLOOD PRESSURE: 121 MMHG | DIASTOLIC BLOOD PRESSURE: 47 MMHG

## 2020-09-11 VITALS — DIASTOLIC BLOOD PRESSURE: 60 MMHG | SYSTOLIC BLOOD PRESSURE: 112 MMHG

## 2020-09-11 LAB
ALBUMIN SERPL BCP-MCNC: 2.8 G/DL (ref 3.4–5)
ALBUMIN SERPL ELPH-MCNC: 3 G/DL (ref 2.9–4.4)
ALBUMIN/GLOB SERPL: 0.8 {RATIO} (ref 0.7–1.7)
ALP SERPL-CCNC: 96 U/L (ref 46–116)
ALPHA1 GLOB SERPL ELPH-MCNC: 0.3 G/DL (ref 0–0.4)
ALPHA2 GLOB SERPL ELPH-MCNC: 0.9 G/DL (ref 0.4–1)
ALT SERPL W P-5'-P-CCNC: 20 U/L (ref 12–78)
AST SERPL W P-5'-P-CCNC: 23 U/L (ref 15–37)
B-GLOBULIN SERPL ELPH-MCNC: 1 G/DL (ref 0.7–1.3)
BASOPHILS # BLD AUTO: 0 /CMM (ref 0–0.2)
BASOPHILS NFR BLD AUTO: 0.5 % (ref 0–2)
BILIRUB SERPL-MCNC: 1.1 MG/DL (ref 0.2–1)
BUN SERPL-MCNC: 37 MG/DL (ref 7–18)
CALCIUM SERPL-MCNC: 8.4 MG/DL (ref 8.5–10.1)
CHLORIDE SERPL-SCNC: 108 MMOL/L (ref 98–107)
CO2 SERPL-SCNC: 26 MMOL/L (ref 21–32)
CREAT SERPL-MCNC: 1.2 MG/DL (ref 0.6–1.3)
EOSINOPHIL NFR BLD AUTO: 6.6 % (ref 0–6)
GAMMA GLOB SERPL ELPH-MCNC: 1.6 G/DL (ref 0.4–1.8)
GLOBULIN SER CALC-MCNC: 3.8 G/DL (ref 2.2–3.9)
GLUCOSE SERPL-MCNC: 105 MG/DL (ref 74–106)
HCT VFR BLD AUTO: 36 % (ref 39–51)
HGB BLD-MCNC: 11.8 G/DL (ref 13.5–17.5)
LYMPHOCYTES NFR BLD AUTO: 1.6 /CMM (ref 0.8–4.8)
LYMPHOCYTES NFR BLD AUTO: 21 % (ref 20–44)
MAGNESIUM SERPL-MCNC: 2 MG/DL (ref 1.8–2.4)
MCHC RBC AUTO-ENTMCNC: 33 G/DL (ref 31–36)
MCV RBC AUTO: 83 FL (ref 80–96)
MONOCYTES NFR BLD AUTO: 0.9 /CMM (ref 0.1–1.3)
MONOCYTES NFR BLD AUTO: 11.7 % (ref 2–12)
NEUTROPHILS # BLD AUTO: 4.7 /CMM (ref 1.8–8.9)
NEUTROPHILS NFR BLD AUTO: 60.2 % (ref 43–81)
PHOSPHATE SERPL-MCNC: 3.2 MG/DL (ref 2.5–4.9)
PLATELET # BLD AUTO: 301 /CMM (ref 150–450)
POTASSIUM SERPL-SCNC: 4.4 MMOL/L (ref 3.5–5.1)
PROT SERPL-MCNC: 6.9 G/DL (ref 6.4–8.2)
RBC # BLD AUTO: 4.32 MIL/UL (ref 4.5–6)
SODIUM SERPL-SCNC: 142 MMOL/L (ref 136–145)
WBC NRBC COR # BLD AUTO: 7.8 K/UL (ref 4.3–11)

## 2020-09-11 RX ADMIN — Medication SCH EACH: at 17:22

## 2020-09-11 RX ADMIN — ATORVASTATIN CALCIUM SCH MG: 10 TABLET, FILM COATED ORAL at 21:36

## 2020-09-11 RX ADMIN — MUPIROCIN SCH APPLIC: 20 OINTMENT TOPICAL at 22:14

## 2020-09-11 RX ADMIN — Medication SCH EACH: at 13:22

## 2020-09-11 RX ADMIN — MUPIROCIN SCH APPLIC: 20 OINTMENT TOPICAL at 08:01

## 2020-09-11 RX ADMIN — ENOXAPARIN SODIUM SCH MG: 40 INJECTION SUBCUTANEOUS at 21:56

## 2020-09-11 RX ADMIN — Medication SCH EACH: at 07:56

## 2020-09-11 RX ADMIN — TRAZODONE HYDROCHLORIDE SCH MG: 50 TABLET ORAL at 21:36

## 2020-09-11 RX ADMIN — MONTELUKAST SODIUM SCH MG: 10 TABLET, FILM COATED ORAL at 08:02

## 2020-09-11 RX ADMIN — ZOLPIDEM TARTRATE PRN MG: 5 TABLET, FILM COATED ORAL at 21:36

## 2020-09-11 RX ADMIN — ESCITALOPRAM OXALATE SCH MG: 10 TABLET, FILM COATED ORAL at 08:02

## 2020-09-11 RX ADMIN — SODIUM CHLORIDE PRN MLS/HR: 9 INJECTION, SOLUTION INTRAVENOUS at 19:33

## 2020-09-11 RX ADMIN — FERROUS SULFATE TAB 325 MG (65 MG ELEMENTAL FE) SCH MG: 325 (65 FE) TAB at 08:02

## 2020-09-11 RX ADMIN — Medication PRN MG: at 12:18

## 2020-09-11 RX ADMIN — Medication PRN MG: at 12:12

## 2020-09-11 RX ADMIN — Medication PRN MG: at 12:22

## 2020-09-11 RX ADMIN — SODIUM CHLORIDE PRN MLS/HR: 9 INJECTION, SOLUTION INTRAVENOUS at 06:12

## 2020-09-11 RX ADMIN — LINAGLIPTIN SCH MG: 5 TABLET, FILM COATED ORAL at 08:02

## 2020-09-11 RX ADMIN — TAMSULOSIN HYDROCHLORIDE SCH MG: 0.4 CAPSULE ORAL at 21:36

## 2020-09-11 RX ADMIN — Medication SCH EACH: at 22:26

## 2020-09-11 NOTE — NUR
Patient resting.VS remains stable.SR/SB 50's non sustaining.Denies pain or sob.Patient desat 


to 80's on RA.With O2 2L NC on sating 96%-98%.IVF infusing well.All due medications given.

Continent B/B.No BM noted during the shift.Turned self in bed.Was given sleeping pill as 
requested.

With good night sleep.Safety precaution maintained.Call light at bedside.Will endorse to day 
shift 

for gabrielle.

## 2020-09-11 NOTE — NUR
RN NOTE



1100: Patient went to [facility], VSS.



1250: Patient back from [facility], tolerated procedure, VSS.

## 2020-09-11 NOTE — NUR
RN OPENING NOTE



PT RECEIVED IN BED, A/A/O X4. PT IS ON RA SATING 96%, NO LABOR BREATHING. TELE MONITOR 
SHOWING SR AT 80sWITH OCCASIONAL PVCs . PT HAS NAS PICC LINE FLUSHES WELL, AND PATENT NS 
RUNNING  ML/H. SAFETY MEASURES IN  BED AT LOWEST POSITION, LOCKED, CALL LIGHT IN 
REACH, SIDE RAILS UP X2.

## 2020-09-11 NOTE — NUR
RN NOTE



No any significant changes. Still noted with SR with frequent PVCs. No CP or any discomfort. 
Kept clean, warm and dry. Needs attended. Kept call light at reach. MRSA iso maintained and 
observed. Transferred to tele 112-1 via bed using ACLS protocol. Endorsed care to [facility] GARCIA 
for [facility].

## 2020-09-11 NOTE — NUR
POST CTA PROCEDURE WELL TOLERATED BY THE PT. PT IS NOT IN RESPIRATORY DISTRESS, V/S STABLE, 
KEPT RESTED AND COMFORTABLE. REPORT GIVEN TO RADHA APONTE FOR [facility].

## 2020-09-11 NOTE — NUR
RN NOTE



0715: Received patient awake, A/Ox4. SB 50's with PVCs. NAS PICC intact, IVF infusing as 
ordered. On 2LPM O2 via NC, titrated O2 off, will monitor. Right leg immobilizer in place. 



0800: With order for [facility], made patient aware and agreed and signed consent. With order of 
may transfer to Tele, CN aware. 



0815: S/E by Lisa PHILLIP, no new order at this time. Awaiting [facility] and Tele transfer.



0830: S/E by Dr. Light, tolerated room air. Awaiting Tele room.

## 2020-09-12 VITALS — SYSTOLIC BLOOD PRESSURE: 113 MMHG | DIASTOLIC BLOOD PRESSURE: 68 MMHG

## 2020-09-12 VITALS — SYSTOLIC BLOOD PRESSURE: 126 MMHG | DIASTOLIC BLOOD PRESSURE: 88 MMHG

## 2020-09-12 VITALS — DIASTOLIC BLOOD PRESSURE: 72 MMHG | SYSTOLIC BLOOD PRESSURE: 109 MMHG

## 2020-09-12 VITALS — DIASTOLIC BLOOD PRESSURE: 63 MMHG | SYSTOLIC BLOOD PRESSURE: 111 MMHG

## 2020-09-12 LAB
ALBUMIN SERPL BCP-MCNC: 2.7 G/DL (ref 3.4–5)
ALP SERPL-CCNC: 91 U/L (ref 46–116)
ALT SERPL W P-5'-P-CCNC: 20 U/L (ref 12–78)
AST SERPL W P-5'-P-CCNC: 23 U/L (ref 15–37)
BASOPHILS # BLD AUTO: 0.1 /CMM (ref 0–0.2)
BASOPHILS NFR BLD AUTO: 0.9 % (ref 0–2)
BILIRUB SERPL-MCNC: 0.9 MG/DL (ref 0.2–1)
BUN SERPL-MCNC: 22 MG/DL (ref 7–18)
CALCIUM SERPL-MCNC: 8 MG/DL (ref 8.5–10.1)
CHLORIDE SERPL-SCNC: 106 MMOL/L (ref 98–107)
CO2 SERPL-SCNC: 25 MMOL/L (ref 21–32)
CREAT SERPL-MCNC: 1 MG/DL (ref 0.6–1.3)
EOSINOPHIL NFR BLD AUTO: 5.4 % (ref 0–6)
GLUCOSE SERPL-MCNC: 97 MG/DL (ref 74–106)
HCT VFR BLD AUTO: 33 % (ref 39–51)
HGB BLD-MCNC: 11 G/DL (ref 13.5–17.5)
LYMPHOCYTES NFR BLD AUTO: 1.4 /CMM (ref 0.8–4.8)
LYMPHOCYTES NFR BLD AUTO: 18.7 % (ref 20–44)
MAGNESIUM SERPL-MCNC: 1.9 MG/DL (ref 1.8–2.4)
MCHC RBC AUTO-ENTMCNC: 33 G/DL (ref 31–36)
MCV RBC AUTO: 83 FL (ref 80–96)
MONOCYTES NFR BLD AUTO: 1 /CMM (ref 0.1–1.3)
MONOCYTES NFR BLD AUTO: 13.5 % (ref 2–12)
NEUTROPHILS # BLD AUTO: 4.7 /CMM (ref 1.8–8.9)
NEUTROPHILS NFR BLD AUTO: 61.5 % (ref 43–81)
PHOSPHATE SERPL-MCNC: 2.8 MG/DL (ref 2.5–4.9)
PLATELET # BLD AUTO: 283 /CMM (ref 150–450)
POTASSIUM SERPL-SCNC: 3.7 MMOL/L (ref 3.5–5.1)
PROT SERPL-MCNC: 6.7 G/DL (ref 6.4–8.2)
RBC # BLD AUTO: 4.04 MIL/UL (ref 4.5–6)
SODIUM SERPL-SCNC: 140 MMOL/L (ref 136–145)
WBC NRBC COR # BLD AUTO: 7.6 K/UL (ref 4.3–11)

## 2020-09-12 RX ADMIN — MUPIROCIN SCH APPLIC: 20 OINTMENT TOPICAL at 08:19

## 2020-09-12 RX ADMIN — Medication SCH EACH: at 08:17

## 2020-09-12 RX ADMIN — MONTELUKAST SODIUM SCH MG: 10 TABLET, FILM COATED ORAL at 08:18

## 2020-09-12 RX ADMIN — ESCITALOPRAM OXALATE SCH MG: 10 TABLET, FILM COATED ORAL at 08:18

## 2020-09-12 RX ADMIN — Medication SCH EACH: at 12:07

## 2020-09-12 RX ADMIN — SODIUM CHLORIDE PRN MLS/HR: 9 INJECTION, SOLUTION INTRAVENOUS at 08:23

## 2020-09-12 RX ADMIN — INSULIN HUMAN PRN UNIT: 100 INJECTION, SOLUTION PARENTERAL at 08:33

## 2020-09-12 RX ADMIN — FERROUS SULFATE TAB 325 MG (65 MG ELEMENTAL FE) SCH MG: 325 (65 FE) TAB at 08:18

## 2020-09-12 RX ADMIN — LINAGLIPTIN SCH MG: 5 TABLET, FILM COATED ORAL at 08:18

## 2020-09-12 NOTE — NUR
PATIENT PICKED UP BY TRANSPORT TO GO TO [facility]. REPORT GIVEN TO MIRTA WILCOX RN. 
PATIENT DISCHARGED IN STABLE CONDITION.

## 2020-09-12 NOTE — NUR
RN CLOSING NOTE



PT REMAINED STABLE DURING MY SHIFT NO ACUTE CHANGES. REPORT GIVEN TO INCOMING SHIFT.

## 2020-09-12 NOTE — NUR
RECEIVED PATIENT IN BED. NO ACUTE DISTRESS NOTED. PATIENT ALERT & ORIENTED X3. PATIENT ON 
ROOM AIR, SATURATING WELL, BREATHING EVEN AND UNLABORED. PATIENT ON TELEMETRY MONITOR, 
NORMAL SINUS RHYTHM NOTED. PATIENT RIGHT UPPER ARM PICC LINE IN PLACE, INTACT, PATENT, 
FLUSHED WELL. PATIENT SAFETY MEASURES MAINTAINED. CALL LIGHT WITHIN REACH. WILL CONTINUE TO 
MONITOR.

## 2023-03-23 ENCOUNTER — HOSPITAL ENCOUNTER (EMERGENCY)
Dept: HOSPITAL 12 - ER | Age: 73
LOS: 1 days | Discharge: NURSING, REHAB OR LONG-TERM CARE | End: 2023-03-24
Payer: MEDICARE

## 2023-03-23 VITALS — HEIGHT: 65 IN | WEIGHT: 175 LBS | BODY MASS INDEX: 29.16 KG/M2

## 2023-03-23 DIAGNOSIS — Z88.2: ICD-10-CM

## 2023-03-23 DIAGNOSIS — R74.8: ICD-10-CM

## 2023-03-23 DIAGNOSIS — R21: Primary | ICD-10-CM

## 2023-03-23 DIAGNOSIS — N17.9: ICD-10-CM

## 2023-03-23 PROCEDURE — 99283 EMERGENCY DEPT VISIT LOW MDM: CPT

## 2023-03-23 PROCEDURE — 80053 COMPREHEN METABOLIC PANEL: CPT

## 2023-03-23 PROCEDURE — 36415 COLL VENOUS BLD VENIPUNCTURE: CPT

## 2023-03-23 PROCEDURE — A4663 DIALYSIS BLOOD PRESSURE CUFF: HCPCS

## 2023-03-23 PROCEDURE — 85025 COMPLETE CBC W/AUTO DIFF WBC: CPT

## 2023-03-24 LAB
ALP SERPL-CCNC: 175 U/L (ref 50–136)
ALT SERPL W/O P-5'-P-CCNC: 33 U/L (ref 16–63)
AST SERPL-CCNC: 35 U/L (ref 15–37)
BILIRUB SERPL-MCNC: 0.6 MG/DL (ref 0.2–1)
BUN SERPL-MCNC: 22 MG/DL (ref 7–18)
CHLORIDE SERPL-SCNC: 105 MMOL/L (ref 98–107)
CO2 SERPL-SCNC: 28 MMOL/L (ref 21–32)
CREAT SERPL-MCNC: 0.9 MG/DL (ref 0.6–1.3)
GLUCOSE SERPL-MCNC: 100 MG/DL (ref 74–106)
HCT VFR BLD AUTO: 41.9 % (ref 36.7–47.1)
MCH RBC QN AUTO: 30.1 UUG (ref 23.8–33.4)
MCV RBC AUTO: 89.5 FL (ref 73–96.2)
PLATELET # BLD AUTO: 271 K/UL (ref 152–348)
POTASSIUM SERPL-SCNC: 3.7 MMOL/L (ref 3.5–5.1)
WS STN SPEC: 7.6 G/DL (ref 6.4–8.2)

## 2023-03-24 NOTE — NUR
Spoke with Shital again.  She arranged ride-share car.  Assisted pt into 
ride-share.  Pt transported back to [facility].

## 2023-03-24 NOTE — NUR
Spoke with Chelsey from the [facility] care. Stated they are unable to provide 
transportation at this time.

## 2023-03-24 NOTE — NUR
Called [phone], spoke with the owner of the previous group home patient 
stayed in. She provided an additional number to reach the new group home [phone].

## 2023-03-27 ENCOUNTER — HOSPITAL ENCOUNTER (EMERGENCY)
Dept: HOSPITAL 12 - ER | Age: 73
Discharge: HOME | End: 2023-03-27
Payer: MEDICARE

## 2023-03-27 VITALS — HEIGHT: 65 IN | BODY MASS INDEX: 29.16 KG/M2 | WEIGHT: 175 LBS

## 2023-03-27 VITALS — SYSTOLIC BLOOD PRESSURE: 120 MMHG | DIASTOLIC BLOOD PRESSURE: 80 MMHG

## 2023-03-27 DIAGNOSIS — R21: ICD-10-CM

## 2023-03-27 DIAGNOSIS — S30.0XXA: Primary | ICD-10-CM
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
## 2023-03-27 DIAGNOSIS — Z88.2: ICD-10-CM

## 2023-03-27 DIAGNOSIS — E11.9: ICD-10-CM
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
## 2023-03-27 DIAGNOSIS — E78.5: ICD-10-CM

## 2023-03-27 DIAGNOSIS — R30.0: ICD-10-CM
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
## 2023-03-27 DIAGNOSIS — Z79.899: ICD-10-CM

## 2023-03-27 DIAGNOSIS — I10: ICD-10-CM

## 2023-03-27 LAB
APPEARANCE UR: CLEAR
BILIRUB UR QL STRIP: NEGATIVE
COLOR UR: YELLOW
GLUCOSE UR STRIP-MCNC: NEGATIVE MG/DL
HGB UR QL STRIP: NEGATIVE
KETONES UR STRIP-MCNC: NEGATIVE MG/DL
LEUKOCYTE ESTERASE UR QL STRIP: NEGATIVE
NITRITE UR QL STRIP: NEGATIVE
PH UR STRIP: 5.5 [PH] (ref 5–8)
SP GR UR STRIP: >=1.03 (ref 1–1.03)
UROBILINOGEN UR STRIP-MCNC: 0.2 E.U./DL

## 2023-03-27 PROCEDURE — A4663 DIALYSIS BLOOD PRESSURE CUFF: HCPCS

## 2023-03-27 NOTE — NUR
Called [facility] for 2nd time and left a massage notifing them of pt's 
return to their facility.

No call back recieved. Dr Bledsoe made aware.

## 2023-03-27 NOTE — NUR
ROMA attempted to call Shital ([phone]) from the [facility] [address] [place] [phone] five times and left a voicemail. ROMA also attempted to call 
patient's primary contact, Ms. BLAKELY ([phone]) five times and left a voicemail as well. ROMA 
spoke with charge nurse, Shyann and she attempted to call twice and left a voicemail. ROMA 
informed Dr. Bledseo.

## 2025-01-09 NOTE — NUR
Patient has appt for 01/14/25 and would like to discuss referral for Neurologist during this visit.   RN NOTE:



Received report from RADHA Bains for [facility]. Patient in stable condition. Call light in reach. 
Bed locked, low and at semi-glez's position. Side rails up x3. Safety ensured and 
observed. Will continue to monitor.